# Patient Record
Sex: MALE | Race: BLACK OR AFRICAN AMERICAN | Employment: FULL TIME | URBAN - METROPOLITAN AREA
[De-identification: names, ages, dates, MRNs, and addresses within clinical notes are randomized per-mention and may not be internally consistent; named-entity substitution may affect disease eponyms.]

---

## 2020-01-31 ENCOUNTER — HOSPITAL ENCOUNTER (OUTPATIENT)
Dept: WOUND CARE | Age: 60
Discharge: HOME | End: 2020-01-31
Attending: PODIATRIST
Payer: MEDICAID

## 2020-01-31 PROCEDURE — 29580 STRAPPING UNNA BOOT: CPT

## 2020-01-31 NOTE — WOUND CARE
[address]    Subjective:         Chief Complaint: Jayden Bartlett is a 64 y.o. male who presents to [facility] today, for treatment of swelling in both legs and an open wound on the both legs. It started a few weeks ago with swelling in both his legs that caused blisters that opened up. He states it started a week or 2 ago and he was doing leg wraps at home, but it was getting worse. No past medical history on file. No past surgical history on file. Current Medications:  Prior to Admission medications    Not on File     Allergies not on file  No family history on file.   Social History     Socioeconomic History    Marital status: SINGLE     Spouse name: Not on file    Number of children: Not on file    Years of education: Not on file    Highest education level: Not on file   Occupational History    Not on file   Social Needs    Financial resource strain: Not on file    Food insecurity:     Worry: Not on file     Inability: Not on file    Transportation needs:     Medical: Not on file     Non-medical: Not on file   Tobacco Use    Smoking status: Not on file   Substance and Sexual Activity    Alcohol use: Not on file    Drug use: Not on file    Sexual activity: Not on file   Lifestyle    Physical activity:     Days per week: Not on file     Minutes per session: Not on file    Stress: Not on file   Relationships    Social connections:     Talks on phone: Not on file     Gets together: Not on file     Attends Mandaeism service: Not on file     Active member of club or organization: Not on file     Attends meetings of clubs or organizations: Not on file     Relationship status: Not on file    Intimate partner violence:     Fear of current or ex partner: Not on file     Emotionally abused: Not on file     Physically abused: Not on file     Forced sexual activity: Not on file   Other Topics Concern    Not on file   Social History Narrative    Not on file       Review of Systems:    Gen: No fever, chills, malaise, weight loss/gain. Heent: No headache, rhinorrhea, epistaxis, ear pain, hearing loss, sinus pain, neck pain/stiffness, sore throat. Heart: No chest pain, palpitations, MI, pnd, or orthopnea. Resp: No cough, hemoptysis, wheezing and shortness of breath. GI: No nausea, vomiting, diarrhea, constipation, melena or hematochezia. : No urinary obstruction, dysuria or hematuria. Derm:   Multiple scattered open ulcers right lower extremity and small open ulcer left leg.  Ozell Splinter: no bone or joint complains. Vasc: No edema, cyanosis or claudication. Endo: No heat/cold intolerance, no polyuria,polydipsia or polyphagia. Neuro: No unilateral weakness, numbness, tingling. No seizures. Heme: No easy bruising or bleeding. Objective:     Physical Assessment:    There were no vitals filed for this visit.   Lower Extremity Exam:    Vascular Exam:  Dorsalis Pedis Pulse: 2/4  Bilateral Lower Extremities  Posterior Tibial Pulse: 2/4 Bilateral Lower Extremities  Capillary Refill is < 3 seconds Bilateral Lower Extremities  Temperature of extremity from proximal to distal is warm and perfused Bilateral Lower Extremities  Recent KERRY results: None     Integumentary Exam:  Skin Texture is WNL Bilateral Lower Extremities  Pedal Hair is present Bilateral Lower Extremities  Examination of lower extremity reveals open ulcers right & left legs  Etiology: Started as a blister    Wound Description:   Wound Type: Venous stasis     Indication: Acute <30days      Status: initial     Measurements: left leg: cm    Wound Length:  0.1      Wound Width : 0.1      Wound Depth : 0.1     Tissue Type: Ulcer bed: Red granular,  base    Periwound: no Surrounding erythema with intact dermal layer    Exudate: No serous or Purulent drainage noted  Epithelial: with out necrosis  Granulation: with out necrosis  Subcutaneous: with out necrosis  Slough: no  Eschar: no  Tendon: exposed no -with out necrosis  Fascia: exposed no -with out necrosis  Muscle: exposed no -with out necrosis  Bone: exposed no -with out necrosis   Drainage: Increased  Stable      Debridement: --not required    Infection: no  Edema: yes  Both lower extremities   Status: controlled increased   Edema is being managed with: Elevation     Patient was educated on the importance of edema control today    Compression: no    Status:  Start     Type: Izabela Petersen,    Patient Compliant:  yes     Offloading:  No     Nicotine/Tobacco Use:  No  Nutrition:  Status malnourished  WNL    Orthopedic Exam:  Musculoskeletal Exam: Muscle strength is 5/5 Bilateral Lower Extremities. Tendon, Muscle and Bone and joints and WNL Bilateral Lower Extremities. Range of motion and strength noted in both ankles is WNL. Neurological Exam:  Ankle deep tendon reflexes: Are WNL Bilateral Lower Extremities  Epicritic and Protective Sensation: Are Intact Bilateral Lower Extremities  Evaluation of lower extremity nerves: Are Intact as seen with 5.07 mm monofilament at 5 points per foot. Laboratory Results:  @ Basic Metabolic Profile@  Lab Results   Component Value Date     (H) 08/30/2012    CO2 26 08/30/2012    BUN 49 (H) 08/30/2012       Data Review: No results found for this or any previous visit (from the past 24 hour(s)). Assessment:     Open ulcer   Venous insufficiency with lower extremity edema bilateral lower extremities.     Recommendation:     Plan:     Plan/Procedure:  Needs :  Good local wound care  Edema management    In wound care clinic today:  Cleanse wound with commercial wound cleanser  Apply the following topically to wound bed: Gerri  Mepatel-1  Enodform  Pramagram  Mesalt Mextra Sorbian Exiber-AG,    Apply the following to juli-wound: NA   Apply the following dressings: IZABELA Olivera  Profore compressive dressing    For Home Care/Self Care:  Cleanse wound with NS or soap and water or commercial wound cleanser  Keep dressing dry and intact when bathing  Apply the same dressings as above. Leave dressings in place until next visit    Patient to return for wound care in: 7  Days  Follow up with Nurse visit as recommended. PLEASE CONTACT OFFICE AS SOON AS POSSIBLE IF UNABLE TO MAKE THIS APPOINTMENT. Inspect your wounds, looking for signs of infection which may include the following:  Increase in redness  Red \"streaks\" from wound  Increase in swelling Fever  Unusual odor Change in the amount of wound drainage. Should you experience any significant changes in your wound(s) or have any questions regarding your home care instructions please contact the wound center or your home health company. If after regular business hours, please call your family doctor or local emergency room. Edema Control:   Elevate legs as much as possible. Avoid standing in one position for more than 10 minutes. Avoid setting with legs down. Do not cross legs while sitting. Off-Loading:     Frequent position changes. Do not cross legs while sitting. Shift weight every 20 minutes or more when sitting for prolonged periods of time.       Signed By: Jose Antonio Almeida DPM      January 31, 2020, 11:55 AM

## 2020-02-05 VITALS
TEMPERATURE: 98.6 F | RESPIRATION RATE: 18 BRPM | HEART RATE: 88 BPM | OXYGEN SATURATION: 96 % | SYSTOLIC BLOOD PRESSURE: 157 MMHG | DIASTOLIC BLOOD PRESSURE: 88 MMHG

## 2020-02-05 NOTE — WOUND CARE
02/05/20 1107   Wound Leg lower Left 01/31/20   Date First Assessed/Time First Assessed: 02/05/20 1105   Present on Hospital Admission: Yes  Primary Wound Type: Venous  Location: Leg lower  Wound Location Orientation: Left  Date of First Observation: 01/31/20   Dressing Status Intact   Dressing Type Other (Comment)  (kerlix and ace)   Non-staged Wound Description Full thickness   Wound Length (cm) 0.1 cm  (small scattered open areas, majority of ext. intact)   Wound Width (cm) 0.1 cm   Wound Depth (cm) 0.1 cm   Wound Surface Area (cm^2) 0.01 cm^2   Wound Volume (cm^3) 0 cm^3   Condition of Edges Open   Assessment Edema   Drainage Amount Scant   Drainage Color Serous   Wound Odor None   Urszula-wound Assessment Intact;Edema   Cleansing and Cleansing Agents  Dermal wound cleanser; Soap and water   Dressing Changed Changed/New   Dressing Type Applied Unna boot  (Hydrofiber AG dressing)   Procedure Tolerated Well   Wound Leg lower Right   Date First Assessed/Time First Assessed: 01/31/20 1108   Present on Hospital Admission: Yes  Primary Wound Type: Venous  Location: Leg lower  Wound Location Orientation: Right   Dressing Status Intact;Breakthrough drainage   Dressing Type Other (Comment)  (kerlix and ace wrap)   Non-staged Wound Description Full thickness   Wound Length (cm)   (large scattered open areas to ant. aspect of leg )   Condition of Base Granulation   Condition of Edges Open   Assessment Edema   Drainage Amount Moderate   Drainage Color Serosanguinous   Wound Odor Mild   Urszula-wound Assessment Edema; Intact   Cleansing and Cleansing Agents  Dermal wound cleanser; Soap and water   Dressing Changed Changed/New   Dressing Type Applied Unna boot  (hydrofiber AG dressing )   Procedure Tolerated Well     [facility] Application   Below Knee    NAME:  Arely Paz  YOB: 1960  MEDICAL RECORD NUMBER:  [id number]  DATE:  1/31/2020    Applied moisturizing agent to dry skin as needed.    Appied primary and secondary dressing as ordered. Applied Unna roll from toes to knee overlapping each time. Applied ace wrap or coban from toes to below the knee. Instructed patient/caregiver to keep dressing dry and intact. DO NOT REMOVE DRESSING. Instructed pt/family/caregiver to report excessive draining, loose bandage, wet dressing, severe pain or tingling in toes. Applied [employer] dressing below the knee to bilateral lower legs. Unna Boot(s) were applied per  Guidelines.      Electronically signed by Megan Crooks RN on 2/5/2020 at 11:46 AM

## 2020-02-05 NOTE — DISCHARGE INSTRUCTIONS
Discharge Instructions for  Luly Wakefield  [address], [facility]  [phone] Fax [phone]    NAME:  Burgess Delgado OF BIRTH:  1960  MEDICAL RECORD NUMBER:  [id number]  DATE:  1/31/2020    Wound Cleansing:   DO not remove dressing to clean wound      Dressings:           Wound Location: Left and Right leg   Leave dressings in place until next visit     Compression:  Apply: [x] Unnaboot Wrap Applied in Clinic [x]RightLeg [x]Left Leg   [x] Multi-layer compression. Do not get leg(s) with wrap wet. If wraps become too tight call the center or completely remove the wrap. [x] Elevate leg(s) above the level of the heart when sitting. [x] Avoid prolonged standing in one place. Dietary:  [] Diet as tolerated: [x] Calorie Diabetic Diet: [] No Added Salt:  [x] Increase Protein: [] Other:   Activity:  [x] Activity as tolerated:  [] Patient has no activity restrictions     [] Strict Bedrest: [] Remain off Work:     [x] May return to full duty work:                                                 Return Appointment:  [] Wound and dressing supply provider:   [] ECF or Home Healthcare:  [x] Wound Assessment [x] Physician or NP scheduled for Wound Assessment   [x] Return Appointment: With MD in 1 Week(s)  [] Ordered tests:      Electronically signed Zora Eduardo RN on 2/5/2020 at 11:28 AM     Joie Ashraf 281: Should you experience any significant changes in your wound(s) or have questions about your wound care, please contact the [address] [place] [address] at [address] [place] [address] 8:00 am - 4:30. If you need help with your wound outside these hours and cannot wait until we are again available, contact your PCP or go to the hospital emergency room. PLEASE NOTE: IF YOU ARE UNABLE TO OBTAIN WOUND SUPPLIES, CONTINUE TO USE THE SUPPLIES YOU HAVE AVAILABLE UNTIL YOU ARE ABLE TO REACH US.  IT IS MOST IMPORTANT TO KEEP THE WOUND COVERED AT ALL TIMES.

## 2020-02-07 ENCOUNTER — HOSPITAL ENCOUNTER (OUTPATIENT)
Dept: WOUND CARE | Age: 60
Discharge: HOME | End: 2020-02-07
Attending: PODIATRIST
Payer: MEDICAID

## 2020-02-07 VITALS
TEMPERATURE: 98.6 F | OXYGEN SATURATION: 100 % | RESPIRATION RATE: 18 BRPM | HEART RATE: 93 BPM | SYSTOLIC BLOOD PRESSURE: 135 MMHG | DIASTOLIC BLOOD PRESSURE: 80 MMHG

## 2020-02-07 PROCEDURE — 29580 STRAPPING UNNA BOOT: CPT

## 2020-02-07 NOTE — WOUND CARE
02/07/20 1432   Wound Leg lower Left 01/31/20   Date First Assessed/Time First Assessed: 02/05/20 1105   Present on Hospital Admission: Yes  Primary Wound Type: Venous  Location: Leg lower  Wound Location Orientation: Left  Date of First Observation: 01/31/20   Dressing Status Intact   Dressing Type Unna boot   Non-staged Wound Description Full thickness   Wound Length (cm) 0.1 cm   Wound Width (cm) 0.1 cm   Wound Depth (cm) 0.1 cm   Wound Surface Area (cm^2) 0.01 cm^2   Wound Volume (cm^3) 0 cm^3   Condition of Edges Open   Assessment Edema   Drainage Amount Scant   Drainage Color Serous   Wound Odor None   Urszula-wound Assessment Intact   Cleansing and Cleansing Agents  Dermal wound cleanser   Dressing Changed Changed/New   Dressing Type Applied Unna boot  (aquacell ag)   Procedure Tolerated Well   Wound Leg lower Right   Date First Assessed/Time First Assessed: 01/31/20 1108   Present on Hospital Admission: Yes  Primary Wound Type: Venous  Location: Leg lower  Wound Location Orientation: Right   Dressing Status Intact   Dressing Type Unna boot   Non-staged Wound Description Full thickness   Wound Length (cm) 3 cm  (small scattered open areas )   Wound Width (cm) 3 cm   Wound Depth (cm) 0.1 cm   Wound Surface Area (cm^2) 9 cm^2   Wound Volume (cm^3) 0.9 cm^3   Condition of Base Granulation   Condition of Edges Open   Assessment Edema   Drainage Amount Small   Drainage Color Serosanguinous   Wound Odor Mild   Urszula-wound Assessment Edema   Cleansing and Cleansing Agents  Dermal wound cleanser   Dressing Changed Changed/New   Dressing Type Applied Unna boot  (aquacell ag)   Procedure Tolerated Well

## 2020-02-07 NOTE — DISCHARGE INSTRUCTIONS
Discharge Instructions for  [address]  [address], [address] [facility]  [phone] Fax [phone]    NAME:  Devonte Read  YOB: 1960  MEDICAL RECORD NUMBER:  [id number]  DATE:  2/7/2020    Wound Cleansing:   Do not scrub or use excessive force. Cleanse wound prior to applying a clean dressing with:  [] Normal Saline [] Keep Wound Dry in Shower    [] Wound Cleanser   [] Cleanse wound with Mild Soap & Water  [x] May Shower at Discharge   [] Other:      Topical Treatments:  Do not apply lotions, creams, or ointments to wound bed unless directed. Dressings:           Wound Location  Bilateral lower legs   [] Apply Primary Dressing:       [] MediHoney Gel [] Alginate with Silver [] Alginate   [] Collagen [] Collagen with Silver   [] Santyl with Moisten saline gauze     [] Hydrocolloid   [] MediHoney Alginate [] Foam with Silver   [] Foam   [] Hydrofera Blue    [] Mepilex Border    [] Moisten with Saline [] Hydrogel [] Mepitel     [] Bactroban/Mupirocin [] Polysporin  [] Other:    [] Pack wound loosely with  [] Iodoform   [] Plain Packing  [] Other   [] Cover and Secure with:     [] Gauze [] Chikis [] Kerlix   [] Ace Wrap [] Cover Roll Tape [] ABD     [] Other:    Avoid contact of tape with skin. [] Change dressing: [] Daily    [] Every Other Day [] Three times per week   [] Once a week [x] Do Not Change Dressing   [] Other:    Edema Control:  Apply: [] Compression Stocking []Right Leg []Left Leg   [] Tubigrip []Right Leg Double Layer []Left Leg Double Layer       []Right Leg Single Layer []Left Leg Single Layer   [] SpandaGrip []Right Leg  []Left Leg      []Low compression 5-10 mm/Hg      []Medium compression 10-20 mm/Hg     []High compression  20-30 mm/Hg   every morning immediately when getting up should be applied to affected leg(s) from mid foot to knee making sure to cover the heel. Remove every night before going to bed.    [x] Elevate leg(s) above the level of the heart when sitting. [] Avoid prolonged standing in one place. [] Elevate arm/hand above the level of the heart []RightArm []LeftArm     Compression:  Apply: [x] Unna boot Applied in Clinic [x]RightLeg [x]Left Leg   [x] Unna boots. Do not get leg(s) with wrap wet. If wraps become too tight call the center or completely remove the wrap. [] Elevate leg(s) above the level of the heart when sitting. [] Avoid prolonged standing in one place. Off-Loading:   [x] Off-loading when [x] walking  [] in bed [] sitting  [] Total non-weight bearing  [] Right Leg  [] Left Leg   [] Assistive Device [] Walker [] Cane  [] Wheelchair  [] Crutches   [] Surgical shoe    [] Podus Boot(s)   [] Foam Boot(s)  [] Roll About    [] Cast Boot [] CROW Boot  [] Other:      Dietary:  [] Diet as tolerated: [x] Calorie Diabetic Diet: [] No Added Salt:  [] Increase Protein: [] Other:   Activity:  [x] Activity as tolerated:  [] Patient has no activity restrictions     [] Strict Bedrest: [] Remain off Work:     [] May return to full duty work:                                   [] Return to work with restrictions:             Return Appointment:  [] Wound and dressing supply provider:   [] ECF or Home Healthcare:  [] Wound Assessment: [] Physician or NP scheduled for Wound Assessment:   [x] Return Appointment:  in  1 Week(s)  [] Ordered tests:      Electronically signed Zora Mojica RN on 2/7/2020 at 2:34 PM     [facility] [place]: Should you experience any significant changes in your wound(s) or have questions about your wound care, please contact the [facility] at [address] [place] [address] 8:00 am - 4:30. If you need help with your wound outside these hours and cannot wait until we are again available, contact your PCP or go to the hospital emergency room.      PLEASE NOTE: IF YOU ARE UNABLE TO OBTAIN WOUND SUPPLIES, CONTINUE TO USE THE SUPPLIES YOU HAVE AVAILABLE UNTIL YOU ARE ABLE TO REACH US. IT IS MOST IMPORTANT TO KEEP THE WOUND COVERED AT ALL TIMES.

## 2020-02-14 ENCOUNTER — HOSPITAL ENCOUNTER (OUTPATIENT)
Dept: WOUND CARE | Age: 60
Discharge: HOME | End: 2020-02-14
Attending: PODIATRIST
Payer: MEDICAID

## 2020-02-14 VITALS
TEMPERATURE: 98.6 F | RESPIRATION RATE: 18 BRPM | SYSTOLIC BLOOD PRESSURE: 152 MMHG | DIASTOLIC BLOOD PRESSURE: 86 MMHG | OXYGEN SATURATION: 97 % | HEART RATE: 93 BPM

## 2020-02-14 PROCEDURE — 29580 STRAPPING UNNA BOOT: CPT

## 2020-02-14 NOTE — WOUND CARE
02/14/20 1320   Wound Leg lower Left 01/31/20   Date First Assessed/Time First Assessed: 02/05/20 1105   Present on Hospital Admission: Yes  Primary Wound Type: Venous  Location: Leg lower  Wound Location Orientation: Left  Date of First Observation: 01/31/20   Dressing Status Old drainage;Moist   Dressing Type Unna boot   Non-staged Wound Description Full thickness   Wound Length (cm) 0.1 cm   Wound Width (cm) 0.1 cm   Wound Depth (cm) 0.1 cm   Wound Surface Area (cm^2) 0.01 cm^2   Wound Volume (cm^3) 0 cm^3   Condition of Edges Open   Assessment Edema   Drainage Amount Scant   Drainage Color Serosanguinous   Wound Odor Musty   Urszula-wound Assessment Edema   Cleansing and Cleansing Agents  Soap and water   Dressing Changed Changed/New   Dressing Type Applied Unna boot   Procedure Tolerated Well   Wound Leg lower Right   Date First Assessed/Time First Assessed: 01/31/20 1108   Present on Hospital Admission: Yes  Primary Wound Type: Venous  Location: Leg lower  Wound Location Orientation: Right   Dressing Status Moist   Dressing Type Absorptive; Unna boot   Non-staged Wound Description Full thickness   Wound Length (cm) 2 cm   Wound Width (cm) 2 cm   Wound Depth (cm) 0.1 cm   Wound Surface Area (cm^2) 4 cm^2   Wound Volume (cm^3) 0.4 cm^3   Condition of Base Pink   Condition of Edges Open   Assessment Edema; Excoriated   Drainage Amount Small   Drainage Color Serosanguinous; Tan   Wound Odor Pungent;Musty   Urszula-wound Assessment Edema   Cleansing and Cleansing Agents  Soap and water;Dermal wound cleanser   Dressing Changed Changed/New   Dressing Type Applied Unna boot   Procedure Tolerated Well           Unna Boot Application   Below Knee    NAME:  Catia Elizabeth OF BIRTH:  1960  MEDICAL RECORD NUMBER:  [id number]  DATE:  2/14/2020    Remove old Unna Boot or Boots. Applied moisturizing agent to dry skin as needed. Appied primary and secondary dressing as ordered.   Applied Unna roll from toes to knee overlapping each time. Applied ace wrap or coban from toes to below the knee. Instructed patient/caregiver to keep dressing dry and intact. DO NOT REMOVE DRESSING. Instructed pt/family/caregiver to report excessive draining, loose bandage, wet dressing, severe pain or tingling in toes. Applied [employer] dressing below the knee to bilateral lower legs. Unna Boot(s) were applied per  Guidelines.      Electronically signed by Grady Sol RN on 2/14/2020 at 1:38 PM

## 2020-02-21 ENCOUNTER — APPOINTMENT (OUTPATIENT)
Dept: WOUND CARE | Age: 60
End: 2020-02-21
Attending: PODIATRIST
Payer: MEDICAID

## 2020-02-24 ENCOUNTER — APPOINTMENT (OUTPATIENT)
Dept: WOUND CARE | Age: 60
End: 2020-02-24
Attending: PODIATRIST
Payer: MEDICAID

## 2020-02-28 ENCOUNTER — HOSPITAL ENCOUNTER (OUTPATIENT)
Dept: WOUND CARE | Age: 60
Discharge: HOME | End: 2020-02-28
Attending: PODIATRIST
Payer: MEDICAID

## 2020-02-28 VITALS
RESPIRATION RATE: 18 BRPM | TEMPERATURE: 98.4 F | HEART RATE: 99 BPM | SYSTOLIC BLOOD PRESSURE: 155 MMHG | OXYGEN SATURATION: 99 % | DIASTOLIC BLOOD PRESSURE: 77 MMHG

## 2020-02-28 PROCEDURE — 99213 OFFICE O/P EST LOW 20 MIN: CPT

## 2020-02-28 NOTE — DISCHARGE INSTRUCTIONS
Leave dressings in place until next visit    Discharged from [address] [place] [address] AS POSSIBLE IF UNABLE TO MAKE THIS APPOINTMENT. Inspect your wounds, looking for signs of infection which may include the following:  Increase in redness  Red \"streaks\" from wound  Increase in swelling  Fever  Unusual odor  Change in the amount of wound drainage. Should you experience any significant changes in your wound(s) or have any questions regarding your home care instructions please contact the wound center or your home health company. If after regular business hours, please call your family doctor or local emergency room. Edema Control: Elevate legs as much as possible. Avoid standing in one position for more than 10 minutes. Avoid setting with legs down. Do not cross legs while sitting. Off-Loading:Frequent position changes. Do not cross legs while sitting. Shift weight every 20 minutes or more when sitting for prolonged periods of time.

## 2020-02-28 NOTE — WOUND CARE
02/28/20 1500   Wound Leg lower Left 01/31/20   Date First Assessed/Time First Assessed: 02/05/20 1105   Present on Hospital Admission: Yes  Primary Wound Type: Venous  Location: Leg lower  Wound Location Orientation: Left  Date of First Observation: 01/31/20   Dressing Status Intact   Dressing Type Unna boot   Non-staged Wound Description Full thickness   Drainage Amount None   Wound Odor None   Cleansing and Cleansing Agents  Dermal wound cleanser   Dressing Changed Changed/New   Dressing Type Applied Unna boot   Procedure Tolerated Well   Wound Leg lower Right   Date First Assessed/Time First Assessed: 01/31/20 1108   Present on Hospital Admission: Yes  Primary Wound Type: Venous  Location: Leg lower  Wound Location Orientation: Right   Dressing Status Intact   Dressing Type Unna boot   Non-staged Wound Description Full thickness   Drainage Amount None   Wound Odor None   Cleansing and Cleansing Agents  Dermal wound cleanser   Dressing Changed Changed/New   Dressing Type Applied Unna boot   Procedure Tolerated Well

## 2020-04-10 NOTE — WOUND CARE
[address]    Subjective:         Chief Complaint: Virgil Thomas is a 64 y.o. male who returns to [facility] today, for follow up treatment of open wounds on the right and left legs. They started about 3 weeks ago from swelling that caused blisters that eventually opened up causing his sores. He is continued weekly wound care with compressive dressings and has some improvement today. Past Medical History:   Diagnosis Date    Diabetes ([facility] [address] [facility]     Hypertension     Morbid obesity ([facility] [address]      No past surgical history on file. Current Medications:  Prior to Admission medications    Not on File     No Known Allergies  No family history on file.   Social History     Socioeconomic History    Marital status: SINGLE     Spouse name: Not on file    Number of children: Not on file    Years of education: Not on file    Highest education level: Not on file   Occupational History    Not on file   Social Needs    Financial resource strain: Not on file    Food insecurity     Worry: Not on file     Inability: Not on file    Transportation needs     Medical: Not on file     Non-medical: Not on file   Tobacco Use    Smoking status: Never Smoker    Smokeless tobacco: Never Used   Substance and Sexual Activity    Alcohol use: Not Currently    Drug use: Not Currently    Sexual activity: Yes   Lifestyle    Physical activity     Days per week: Not on file     Minutes per session: Not on file    Stress: Not on file   Relationships    Social connections     Talks on phone: Not on file     Gets together: Not on file     Attends Moravian service: Not on file     Active member of club or organization: Not on file     Attends meetings of clubs or organizations: Not on file     Relationship status: Not on file    Intimate partner violence     Fear of current or ex partner: Not on file     Emotionally abused: Not on file     Physically abused: Not on file     Forced sexual activity: Not on file   Other Topics Concern     Service Not Asked    Blood Transfusions Not Asked    Caffeine Concern Not Asked    Occupational Exposure Not Asked    Hobby Hazards Not Asked    Sleep Concern Not Asked    Stress Concern Not Asked    Weight Concern Not Asked    Special Diet Not Asked    Back Care Not Asked    Exercise Not Asked    Bike Helmet Not Asked   [address] Not Asked    Self-Exams Not Asked   Social History Narrative    Not on file       Review of Systems:    Gen: No fever, chills, malaise, weight loss/gain. Heent: No headache, rhinorrhea, epistaxis, ear pain, hearing loss, sinus pain, neck pain/stiffness, sore throat. Heart: No chest pain, palpitations, MI, pnd, or orthopnea. Resp: No cough, hemoptysis, wheezing and shortness of breath. GI: No nausea, vomiting, diarrhea, constipation, melena or hematochezia. : No urinary obstruction, dysuria or hematuria. Derm: Open venous stasis ulcers bilateral legs  Musc/skeletal: no bone or joint complains. Vasc: No edema, cyanosis or claudication. Endo: No heat/cold intolerance, no polyuria,polydipsia or polyphagia. Neuro: No unilateral weakness, numbness, tingling. No seizures. Heme: No easy bruising or bleeding.       Objective:     Physical Assessment:    Vitals:    02/07/20 1104   BP: 135/80   Pulse: 93   Resp: 18   Temp: 98.6 °F (37 °C)   SpO2: 100%     Lower Extremity Exam:    Vascular Exam:  Dorsalis Pedis Pulse: 2/4  Bilateral Lower Extremities  Posterior Tibial Pulse: 2/4 Bilateral Lower Extremities  Capillary Refill is < 3 seconds Bilateral Lower Extremities  Temperature of extremity from proximal to distal is warm and perfused Bilateral Lower Extremities  Recent KERRY results: None   Integumentary Exam:  Skin Texture is WNL Bilateral Lower Extremities  Pedal Hair is present Bilateral Lower Extremities  Examination of lower extremity reveals open ulcers right & left legs  Etiology: Started as a blister -    Wound Description:   Wound Type: Venous stasis    Indication:   Chronic >30days   Status: improving      Measurements:righr then left legs:cm    Wound Length:  3.0, 0.1      Wound Width : 3.0, 0.1      Wound Depth : 0.1, 0.1     Tissue Type: Ulcer bed: Red granular base    Periwound: Surrounding erythema with intact dermal layer    Exudate: Slight serous drainage noted  Epithelial: with out necrosis  Granulation: with out necrosis  Subcutaneous: with out necrosis  Slough: yes no  Eschar: yes no  Tendon: exposed no -with out necrosis  Fascia: exposed no -with out necrosis  Muscle: exposed no -with out necrosis  Bone: exposed no -with out necrosis   Drainage: Stable     Debridement:   --not required    Infection: no   Edema: yesBoth lower extremities   Status: improved    Edema is being managed with: compressive dressings  Elevation    Patient was educated on the importance of edema control today    Compression: yes   Status:  Continue      Type: Unna Boot,   Patient Compliant:  yes     Offloading:  No     Nicotine/Tobacco Use:  No  Nutrition:  Status malnourished  Obese  Diabetic type  2  Glucose Control: not controlled  Recent Hgb A1C: no  Educated on importance of balanced nutrition- yes    Orthopedic Exam:  Musculoskeletal Exam: Muscle strength is 5/5 Bilateral Lower Extremities. Tendon, Muscle and Bone and joints and WNL Bilateral Lower Extremities. Range of motion and strength noted in both ankles is WNL. Neurological Exam:  Ankle deep tendon reflexes: Are WNL Bilateral Lower Extremities  Epicritic and Protective Sensation: Are Decreased Bilateral Lower Extremities  Evaluation of lower extremity nerves: Are Decreased as seen with 5.07 mm monofilament at 5 points per foot.       Laboratory Results:  @ Basic Metabolic Profile@  Lab Results   Component Value Date     (H) 08/30/2012    CO2 26 08/30/2012    BUN 49 (H) 08/30/2012       Data Review: No results found for this or any previous visit (from the past 24 hour(s)). Assessment:     Type II Diabetes with PN. Open venous stasis ulcers bilateral legs  Venous insufficiency with lower extremity edema bilateral lower extremities. Recommendation:     Plan:     Plan/Procedure:  Needs :  Good local wound care  Edema management  Nutrition optimization  Good Diabetic control    In wound care clinic today:  Cleanse wounds with commercial wound cleanser  Apply the following topically to wound bed: Aquacel to both legs    Apply the following to juli-wound: NA   Apply the following dressings: UNNA boot  Chikis  Coban  compressive dressing to both legs    For Home Care/Self Care:  Cleanse wound with NS or soap and water or commercial wound cleanser  Keep dressing dry and intact when bathing  Apply the same dressings as above. Leave dressings in place until next visit    Patient to return for wound care in: 7 days  Follow up with Nurse visit as recommended. PLEASE CONTACT OFFICE AS SOON AS POSSIBLE IF UNABLE TO MAKE THIS APPOINTMENT. Inspect your wounds, looking for signs of infection which may include the following:  Increase in redness  Red \"streaks\" from wound  Increase in swelling Fever  Unusual odor Change in the amount of wound drainage. Should you experience any significant changes in your wound(s) or have any questions regarding your home care instructions please contact the wound center or your home health company. If after regular business hours, please call your family doctor or local emergency room. Edema Control:   Elevate legs as much as possible. Avoid standing in one position for more than 10 minutes. Avoid setting with legs down. Do not cross legs while sitting. Off-Loading:     Frequent position changes. Do not cross legs while sitting. Shift weight every 20 minutes or more when sitting for prolonged periods of time.       Signed By: Unique Beavers DPM      April 10, 2020, 3:05 PM

## 2020-04-10 NOTE — WOUND CARE
Expand All Collapse All      []Hide copied text    []Dago for details  [address] [facility] [address]     Subjective:            Chief Complaint: Baron Parisi is a 64 y.o. male who returns to [facility] today, for follow up treatment of open wounds on the right and left legs. They started about 4 weeks ago from swelling that caused blisters that eventually opened up causing his sores. He has continued weekly wound care with compressive dressings and has good improvement today.          Past Medical History:   Diagnosis Date    Diabetes ([facility] [address]      Hypertension      Morbid obesity ([facility] [address]        No past surgical history on file. Current Medications:  Prior to Admission medications    Not on File      No Known Allergies  No family history on file.   Social History            Socioeconomic History    Marital status: SINGLE       Spouse name: Not on file    Number of children: Not on file    Years of education: Not on file    Highest education level: Not on file   Occupational History    Not on file   Social Needs    Financial resource strain: Not on file    Food insecurity       Worry: Not on file       Inability: Not on file    Transportation needs       Medical: Not on file       Non-medical: Not on file   Tobacco Use    Smoking status: Never Smoker    Smokeless tobacco: Never Used   Substance and Sexual Activity    Alcohol use: Not Currently    Drug use: Not Currently    Sexual activity: Yes   Lifestyle    Physical activity       Days per week: Not on file       Minutes per session: Not on file    Stress: Not on file   Relationships    Social connections       Talks on phone: Not on file       Gets together: Not on file       Attends Latter-day service: Not on file       Active member of club or organization: Not on file       Attends meetings of clubs or organizations: Not on file       Relationship status: Not on file    Intimate partner violence       Fear of current or ex partner: Not on file       Emotionally abused: Not on file       Physically abused: Not on file       Forced sexual activity: Not on file   Other Topics Concern     Service Not Asked    Blood Transfusions Not Asked    Caffeine Concern Not Asked    Occupational Exposure Not Asked    Hobby Hazards Not Asked    Sleep Concern Not Asked    Stress Concern Not Asked    Weight Concern Not Asked    Special Diet Not Asked    Back Care Not Asked    Exercise Not Asked    Bike Helmet Not Asked   [address] Not Asked    Self-Exams Not Asked   Social History Narrative    Not on file         Review of Systems:     Gen: No fever, chills, malaise, weight loss/gain. Heent: No headache, rhinorrhea, epistaxis, ear pain, hearing loss, sinus pain, neck pain/stiffness, sore throat. Heart: No chest pain, palpitations, MI, pnd, or orthopnea. Resp: No cough, hemoptysis, wheezing and shortness of breath. GI: No nausea, vomiting, diarrhea, constipation, melena or hematochezia. : No urinary obstruction, dysuria or hematuria. Derm: venous stasis ulcers bilateral legs  Musc/skeletal: no bone or joint complains. Vasc: No edema, cyanosis or claudication. Endo: No heat/cold intolerance, no polyuria,polydipsia or polyphagia. Neuro: No unilateral weakness, numbness, tingling. No seizures.    Heme: No easy bruising or bleeding.        Objective:      Physical Assessment:         Vitals:     02/07/20 1104   BP: 135/80   Pulse: 93   Resp: 18   Temp: 98.6 °F (37 °C)   SpO2: 100%      Lower Extremity Exam:     Vascular Exam:  Dorsalis Pedis Pulse: 2/4  Bilateral Lower Extremities  Posterior Tibial Pulse: 2/4 Bilateral Lower Extremities  Capillary Refill is < 3 seconds Bilateral Lower Extremities  Temperature of extremity from proximal to distal is warm and perfused Bilateral Lower Extremities  Recent KERRY results: None   Integumentary Exam:  Skin Texture is WNL Bilateral Lower Extremities  Pedal Hair is present Bilateral Lower Extremities  Examination of lower extremity reveals open ulcers right & left legs  Etiology: Started as a blister -     Wound Description:              Wound Type: Venous stasis               Indication:   Chronic >30days              Status: improving                  Measurements:righr then left legs:cm                          Wound Length:  0,0                             Wound Width : 0,0                             Wound Depth :0,0                 Tissue Type: Ulcer bed: Dry scab covering                          Periwound: intact dermal layer                          Exudate: Negative drainage noted  Epithelial: with out necrosis  Granulation: with out necrosis  Subcutaneous: with out necrosis  Slough:  no  Eschar:  no  Tendon: exposed no -with out necrosis  Fascia: exposed no -with out necrosis  Muscle: exposed no -with out necrosis  Bone: exposed no -with out necrosis              Drainage: Stable      Debridement:   --not required     Infection: no   Edema: yes  Both lower extremities              Status: improved               Edema is being managed with: compressive dressings  Elevation               Patient was educated on the importance of edema control today     Compression: yes              Status:  Continue                 Type: Unna Boot,              Patient Compliant:  yes      Offloading:  No      Nicotine/Tobacco Use:  No  Nutrition:  Status malnourished  Obese  Diabetic type  2  Glucose Control: not controlled  Recent Hgb A1C: no  Educated on importance of balanced nutrition- yes     Orthopedic Exam:  Musculoskeletal Exam: Muscle strength is 5/5 Bilateral Lower Extremities. Tendon, Muscle and Bone and joints and WNL Bilateral Lower Extremities.   Range of motion and strength noted in both ankles is WNL.     Neurological Exam:  Ankle deep tendon reflexes: Are WNL Bilateral Lower Extremities  Epicritic and Protective Sensation: Are Decreased Bilateral Lower Extremities  Evaluation of lower extremity nerves: Are Decreased as seen with 5.07 mm monofilament at 5 points per foot.        Laboratory Results:  @ Basic Metabolic Profile@        Lab Results   Component Value Date      (H) 08/30/2012     CO2 26 08/30/2012     BUN 49 (H) 08/30/2012         Data Review:   Recent Results   No results found for this or any previous visit (from the past 24 hour(s)).       Assessment:      Type II Diabetes with PN. Venous stasis ulcers bilateral legs-resolved  Venous insufficiency with lower extremity edema bilateral lower extremities.     Recommendation:      Plan:      Plan/Procedure:  Needs :   Good Edema management bilateral lower extremities  Nutrition optimization  Good Diabetic control     In wound care clinic today:  Apply the following dressings: UNNA boot compressive dressing to both legs  Discharged patient from wound care clinic and instruct the patient on daily compression and elevation for both legs.     For Home Care/Self Care:   Patient to return for wound care in: 0 days  Follow up with Nurse visit as recommended.     PLEASE CONTACT OFFICE AS SOON AS POSSIBLE IF UNABLE TO MAKE THIS APPOINTMENT. Inspect your wounds, looking for signs of infection which may include the following:  Increase in redness  Red \"streaks\" from wound  Increase in swelling Fever  Unusual odor Change in the amount of wound drainage. Should you experience any significant changes in your wound(s) or have any questions regarding your home care instructions please contact the wound center or your home health company. If after regular business hours, please call your family doctor or local emergency room.     Edema Control:   Elevate legs as much as possible. Avoid standing in one position for more than 10 minutes. Avoid setting with legs down. Do not cross legs while sitting.     Off-Loading:     Frequent position changes. Do not cross legs while sitting.  Shift weight every 20 minutes or more when sitting for prolonged periods of time.        Signed By: Teofilo Lau DPM        April 10, 2020, 3:05 PM

## 2020-06-02 ENCOUNTER — HOSPITAL ENCOUNTER (OUTPATIENT)
Dept: WOUND CARE | Age: 60
Discharge: HOME | End: 2020-06-02
Attending: PODIATRIST
Payer: COMMERCIAL

## 2020-06-02 PROCEDURE — 29580 STRAPPING UNNA BOOT: CPT

## 2020-06-02 NOTE — WOUND CARE
Wound Care Progress Note    Subjective:   Comfort Gregorio is a 64 y.o.  male for follow up of Bilateral  Venous  ulcer to the Other part of lower leg Breakdown of Skin  Ulcer has been present for sometime    Doing well. No concerns. Wound care going well. Offloading wound: yes and n/a  Appetite: good  Wound associated pain: mild  Diabetic: yes Type 2  Insulin Dependent    Smoker:    ROS: no N/V, no T/chills; no local rash  There have been no changes in patient's medical history in the interim. Objective:   Physical Exam:    VSS, Afebrile  General: well developed, well nourished, pleasant , NAD. Hygiene good  Psych: cooperative. Pleasant. No anxiety or depression. Normal mood and affect. Neuro: alert and oriented to person/place/situation. Lower extremities: color normal; temperature normal. Hair growth is not present. Calves are supple, nontender, approximately equally sized in comparison. Focused Lower Extremity Exam  Vascular exam:    Bilateral lower extremity: Pitting  +2  edema, foot ,   DP pulse :n/a  PT pulse:   Nails Dystrophic      lower extremity:   edema, foot ,   DP pulse :   PT pulse:    Nails     Other part of lower leg Ulcer 2 Description:   Etiology:Venous   Measurement:   Ulcer bed: Epithialization  eschar    Depth of Wound: Breakdown of Skin    Periwound: Normal  Exudate: Serous minimal        Assessment/Plan   64 y.o. male with Venous  Other part of lower leg ulcer. Stasis edema  Clean wound with Normal Saline  Wound dressing ordered moisturizer, compression with unna boots  Periwound:     Compression ordered:   Prescriptions ordered:   Labs ordered:   Vascular studies:   Tissue Culture and Sensitivity:   Radiology test ordered:   Consultations:     Home Health ordered: How often:       Patient understood and agrees with plan. Questions answered. Weekly visits and serial debridements also discussed.   Follow up with me as needed keep the legs up elevated      Signed By: Maty Mason DPM     June 2, 2020

## 2020-06-11 VITALS
HEART RATE: 73 BPM | SYSTOLIC BLOOD PRESSURE: 153 MMHG | RESPIRATION RATE: 18 BRPM | TEMPERATURE: 97.8 F | OXYGEN SATURATION: 98 % | DIASTOLIC BLOOD PRESSURE: 88 MMHG

## 2020-06-11 NOTE — DISCHARGE INSTRUCTIONS
Discharge Instructions for  Luly Wakefield  [address], [address] [place] [address]  [phone] Fax [phone]    NAME:  Priscila Monroy OF BIRTH:  1960  MEDICAL RECORD NUMBER:  [id number]  DATE:  6/2/2020    Compression:  Apply: [x] Multilayer Compression Wrap Applied in Clinic [x]RightLeg [x]Left Leg   [x] Multi-layer compression. Do not get leg(s) with wrap wet. If wraps become too tight call the center or completely remove the wrap. [x] Elevate leg(s) above the level of the heart when sitting. [x] Avoid prolonged standing in one place. Dietary:  [x] Diet as tolerated: [] Calorie Diabetic Diet: [x] No Added Salt:  [] Increase Protein: [] Other:   Activity:  [x] Activity as tolerated:  [] Patient has no activity restrictions     [] Strict Bedrest: [] Remain off Work:     [] May return to full duty work:                                   [] Return to work with restrictions:             Return Appointment:  [] Wound and dressing supply provider:   [] ECF or Home Healthcare:  [] Wound Assessment: [] Physician or NP scheduled for Wound Assessment:   [x] Return Appointment: With Macrotherapy  in  1 Week(s)  [] Ordered tests:      Electronically signed Sana Agosto RN on 6/2/2020 at [address] [place] [address] Street: Should you experience any significant changes in your wound(s) or have questions about your wound care, please contact the [facility] at [address] [place] [address] 8:00 am - 4:30. If you need help with your wound outside these hours and cannot wait until we are again available, contact your PCP or go to the hospital emergency room. PLEASE NOTE: IF YOU ARE UNABLE TO OBTAIN WOUND SUPPLIES, CONTINUE TO USE THE SUPPLIES YOU HAVE AVAILABLE UNTIL YOU ARE ABLE TO REACH US. IT IS MOST IMPORTANT TO KEEP THE WOUND COVERED AT ALL TIMES.      Physician Signature:_______________________    Date: ___________ Time:  ____________

## 2020-06-11 NOTE — WOUND CARE
06/02/20 1400   Wound Leg lower Left 01/31/20   Date First Assessed/Time First Assessed: 02/05/20 1105   Present on Hospital Admission: Yes  Primary Wound Type: Venous  Location: Leg lower  Wound Location Orientation: Left  Date of First Observation: 01/31/20   Dressing Status Intact   Dressing Type Unna boot   Assessment Edema; Swelling   Drainage Amount None   Wound Odor None   Cleansing and Cleansing Agents  Dermal wound cleanser   Dressing Changed Changed/New   Dressing Type Applied Unna boot   Procedure Tolerated Well   Wound Leg lower Right   Date First Assessed/Time First Assessed: 01/31/20 1108   Present on Hospital Admission: Yes  Primary Wound Type: Venous  Location: Leg lower  Wound Location Orientation: Right   Dressing Status Intact   Dressing Type Unna boot   Non-staged Wound Description Full thickness   Assessment Edema; Swelling   Drainage Amount None   Wound Odor None   Cleansing and Cleansing Agents  Dermal wound cleanser   Dressing Changed Changed/New   Dressing Type Applied Unna boot   Procedure Tolerated Well                   Unna Boot Application   Below Knee    NAME:  Regan Trevino  YOB: 1960  MEDICAL RECORD NUMBER:  [id number]  DATE:  6/2/2020    Remove old Unna Boot or Boots. Applied moisturizing agent to dry skin as needed. Applied Unna roll from toes to knee overlapping each time. Applied ace wrap or coban from toes to below the knee. Instructed patient/caregiver to keep dressing dry and intact. DO NOT REMOVE DRESSING. Instructed pt/family/caregiver to report excessive draining, loose bandage, wet dressing, severe pain or tingling in toes. Applied [employer] dressing below the knee to bilateral lower legs. Unna Boot(s) were applied per  Guidelines.      Electronically signed by Adeel Armstrong RN on 6/2/2020 at 1400

## 2020-07-22 ENCOUNTER — HOSPITAL ENCOUNTER (OUTPATIENT)
Dept: WOUND CARE | Age: 60
Discharge: HOME | End: 2020-07-22
Attending: HOSPITALIST
Payer: COMMERCIAL

## 2020-07-22 VITALS
TEMPERATURE: 99.9 F | SYSTOLIC BLOOD PRESSURE: 131 MMHG | RESPIRATION RATE: 18 BRPM | OXYGEN SATURATION: 100 % | HEART RATE: 52 BPM | DIASTOLIC BLOOD PRESSURE: 82 MMHG

## 2020-07-22 PROCEDURE — 29580 STRAPPING UNNA BOOT: CPT

## 2020-07-23 NOTE — WOUND CARE
[employer] Application   Below Knee    NAME:  Chikis Knight  YOB: 1960  MEDICAL RECORD NUMBER:  [id number]  DATE:  7/22/2020    Remove old [employer] or Boots. Applied moisturizing agent to dry skin as needed. Appied primary and secondary dressing as ordered. Applied Unna roll from toes to knee overlapping each time. Secured with tape and/or metal clips covered with tape. Instructed patient/caregiver to keep dressing dry and intact. DO NOT REMOVE DRESSING. Instructed pt/family/caregiver to report excessive draining, loose bandage, wet dressing, severe pain or tingling in toes. Applied [employer] dressing below the knee to bilateral lower legs. Unna Boot(s) were applied per  Guidelines.      Electronically signed by Leslie Willis LPN on 8/38/1820 at 1:23 PM

## 2020-07-23 NOTE — WOUND CARE
07/22/20 1610   Wound Leg lower Left 01/31/20   Date First Assessed/Time First Assessed: 02/05/20 1105   Present on Hospital Admission: Yes  Primary Wound Type: Venous  Location: Leg lower  Wound Location Orientation: Left  Date of First Observation: 01/31/20   Dressing Status Clean, dry, and intact   Non-staged Wound Description Full thickness   Wound Length (cm) 1 cm   Wound Width (cm) 1 cm   Wound Surface Area (cm^2) 1 cm^2   Change in Wound Size % -9900   Condition of Edges Open   Assessment Edema; Swelling   Drainage Amount Small   Drainage Color Serous   Wound Odor None   Cleansing and Cleansing Agents  Dermal wound cleanser   Dressing Changed Changed/New   Dressing Type Applied Unna boot  (coban)   Procedure Tolerated Well   Wound Leg lower Right   Date First Assessed/Time First Assessed: 01/31/20 1108   Present on Hospital Admission: Yes  Primary Wound Type: Venous  Location: Leg lower  Wound Location Orientation: Right   Dressing Status Intact   Dressing Type 4 x 4;Gauze;Gauze wrap (shayy)   Non-staged Wound Description Full thickness   Wound Length (cm) 2.5 cm   Wound Width (cm) 2 cm   Wound Depth (cm) 0.1 cm   Wound Surface Area (cm^2) 5 cm^2   Wound Volume (cm^3) 0.5 cm^3   Change in Wound Size % 44.44   Condition of Base Pink   Condition of Edges Open   Assessment Edema; Swelling   Drainage Amount None   Wound Odor None   Cleansing and Cleansing Agents  Dermal wound cleanser   Dressing Changed Changed/New   Dressing Type Applied Unna boot  (coban)   Post-Procedure Length (cm) 2.5 cm   Post-Procedure Width (cm) 2 cm   Post-Procedure Depth (cm) 0.1 cm   Post-Procedure Volume (cm^3) 0.5 cm^3   Post-Procedure Surface Area (cm^2) 5 cm^2   Procedure Tolerated Well         Wounds were manual debrided.

## 2020-07-29 ENCOUNTER — HOSPITAL ENCOUNTER (OUTPATIENT)
Dept: WOUND CARE | Age: 60
Discharge: HOME | End: 2020-07-29
Attending: HOSPITALIST
Payer: COMMERCIAL

## 2020-07-29 PROCEDURE — 29580 STRAPPING UNNA BOOT: CPT

## 2020-08-05 ENCOUNTER — HOSPITAL ENCOUNTER (OUTPATIENT)
Dept: WOUND CARE | Age: 60
Discharge: HOME | End: 2020-08-05
Attending: HOSPITALIST
Payer: COMMERCIAL

## 2020-08-05 VITALS
RESPIRATION RATE: 18 BRPM | DIASTOLIC BLOOD PRESSURE: 64 MMHG | SYSTOLIC BLOOD PRESSURE: 124 MMHG | TEMPERATURE: 99 F | HEART RATE: 64 BPM | OXYGEN SATURATION: 100 %

## 2020-08-05 PROCEDURE — 99212 OFFICE O/P EST SF 10 MIN: CPT

## 2020-08-05 NOTE — WOUND CARE
08/05/20 1617   Wound Leg lower Left 01/31/20   Date First Assessed/Time First Assessed: 02/05/20 1105   Present on Hospital Admission: Yes  Primary Wound Type: Venous  Location: Leg lower  Wound Location Orientation: Left  Date of First Observation: 01/31/20   Dressing Status Clean, dry, and intact   Dressing Type Unna boot   Non-staged Wound Description Full thickness   Wound Length (cm) 1 cm   Wound Width (cm) 1 cm   Wound Depth (cm) 0.1 cm   Wound Surface Area (cm^2) 1 cm^2   Wound Volume (cm^3) 0.1 cm^3   Change in Wound Size % -9900   Condition of Edges Open   Assessment Edema; Swelling   Drainage Amount Small   Drainage Color Serous   Wound Odor None   Cleansing and Cleansing Agents  Dermal wound cleanser  (manual debridement)   Dressing Changed Changed/New   Dressing Type Applied Unna boot  (coban)   Wound Procedure Type   (manual debridement)   Procedure Tolerated Well   Wound Leg lower Right   Date First Assessed/Time First Assessed: 01/31/20 1108   Present on Hospital Admission: Yes  Primary Wound Type: Venous  Location: Leg lower  Wound Location Orientation: Right   Dressing Status Intact   Dressing Type 4 x 4;Gauze;Gauze wrap (shayy); Unna boot   Non-staged Wound Description Full thickness   Wound Length (cm) 1.5 cm   Wound Width (cm) 0.1 cm   Wound Depth (cm) 0.1 cm   Wound Surface Area (cm^2) 0.15 cm^2   Wound Volume (cm^3) 0.02 cm^3   Change in Wound Size % 98.33   Condition of Base Pink   Condition of Edges Open   Assessment Edema; Swelling   Drainage Amount None   Wound Odor None   Cleansing and Cleansing Agents  Dermal wound cleanser   Dressing Changed Changed/New   Dressing Type Applied Unna boot  (coban)   Wound Procedure Type Other  (manual debridement)   Post-Procedure Length (cm) 1.5 cm   Post-Procedure Width (cm) 0.5 cm   Post-Procedure Depth (cm) 0.1 cm   Post-Procedure Volume (cm^3) 0.08 cm^3   Post-Procedure Surface Area (cm^2) 0.75 cm^2   Procedure Tolerated Well

## 2020-08-11 PROBLEM — I83.029 VENOUS ULCER OF LEFT LEG (HCC): Status: ACTIVE | Noted: 2020-08-11

## 2020-08-11 PROBLEM — L97.919 VENOUS ULCER OF RIGHT LEG (HCC): Status: ACTIVE | Noted: 2020-08-11

## 2020-08-11 PROBLEM — I83.019 VENOUS ULCER OF RIGHT LEG (HCC): Status: ACTIVE | Noted: 2020-08-11

## 2020-08-11 PROBLEM — L97.929 VENOUS ULCER OF LEFT LEG (HCC): Status: ACTIVE | Noted: 2020-08-11

## 2020-08-11 NOTE — PROGRESS NOTES
[address] [facility]  Initial Consult note         Chief Complaint:  Nicole Lopez is a 64 y.o.  male who presents with bilateral LE edema, chronic venous insufficiency. He has LE venous ulcers since 2017,  He has on and off ulcers since then  Wound caused by: venous insufficiency  Current wound care: local wound care  Offloading wound: yes  Appetite: good  Wound associated pain: none  Diabetic: yes  Smoker: no      Past Medical History:   Diagnosis Date    Chronic venous insufficiency     Diabetes ([facility] [address]     Hypertension     Morbid obesity ([facility] [address]       No past surgical history on file. No family history on file. Social History     Tobacco Use    Smoking status: Never Smoker    Smokeless tobacco: Never Used   Substance Use Topics    Alcohol use: Not Currently       Prior to Admission medications    Not on File     No Known Allergies     Review of Systems  Gen: No fever, chills, malaise, weight loss/gain. Heent: No headache, rhinorrhea, epistaxis, ear pain, hearing loss, sinus pain, neck pain/stiffness, sore throat. Heart: No chest pain, palpitations, MI, pnd, or orthopnea. Resp: No cough, hemoptysis, wheezing and shortness of breath. GI: No nausea, vomiting, diarrhea, constipation, melena or hematochezia. : No urinary obstruction, dysuria or hematuria. Derm: see above   Musc/skeletal: no bone or joint complains. Vasc: No edema, cyanosis or claudication. Endo: No heat/cold intolerance, no polyuria,polydipsia or polyphagia. Neuro: No unilateral weakness, numbness, tingling. No seizures. Heme: No easy bruising or bleeding. Objective:     Physical Exam:     Visit Vitals  /82 (BP 1 Location: Left arm)   Pulse (!) 52   Temp 99.9 °F (37.7 °C)   Resp 18   SpO2 100%     General: well developed, well nourished, pleasant , NAD. Hygiene good  Psych: cooperative. Pleasant. No anxiety or depression. Normal mood and affect. Neuro: alert and oriented to person/place/situation.  Otherwise nonfocal.  Derm: Skin turgor for age, dry skin  HEENT: Normocephalic, atraumatic. EOMI. Conjunctiva clear. No scleral icterus. Neck: Normal range of motion. No masses. Chest: Good air entry bilaterally. Respirations nonlabored  Cardio[de-identified] Normal heart sounds,no rubs, murmurs or gallops  Abdomen: Soft, nontender, nondistended, normoactive bowel sounds  Lower extremities: color normal; temperature normal. Calves are supple, nontender. Capillary refill <3 sec  Focussed Lower Extremity Exam:  Vascular exam:  Bilateral lower extremity: mild  edema, foot ,   DP pulse : 1+  PT pulse: 1+  Left lower leg wound  Wound Length (cm) 1 cm   Wound Width (cm) 1 cm   Wound Surface Area (cm^2) 1 cm^2   Change in Wound Size % -9900   Condition of Edges Open     Right lower leg wound  Wound Length (cm) 2.5 cm   Wound Width (cm) 2 cm   Wound Depth (cm) 0.1 cm   Wound Surface Area (cm^2) 5 cm^2         Wound Description:   Wound Length:      Wound Width :     Wound Depth :     Etiology: venous stasis  Ulcer bed: Mixed Granular/Fibrotic    Periwound: Edematous  Exudate: Scant/small amount Serous exudate    Data Review:   No results found for this or any previous visit (from the past 24 hour(s)). Assessment:     Patient Active Problem List   Diagnosis Code    Venous ulcer of left leg ([facility] [address] I83.029, L97.929    Venous ulcer of right leg ([facility] [address] I83.019, L97.919    Chronic venous insufficiency I87.2    Diabetes ([facility] [address] E11.9    Hypertension I10     64 y.o. male with venous ulcer of LE bilaterally    Needs :  Serial debridement-   Good local wound care  Edema management  Nutrition optimization  Good Diabetic control  Keep legs elevated  Plan:     In wound care clinic today:07/22/2020   Cleanse wound with NS or soap and water or commercial wound cleanser   Apply the following topically to wound bed: Proshield   Apply the following to juli-wound:   Apply the following dressings:unnaboots bilateral lower legs.      For Home Care/Self Care Frequency:   Cleanse wound with NS or soap and water or commercial wound cleanser   Patient may shower without dressing   Keep dressing dry and intact when bathing   Apply the following to wound bed:   Apply the following to skin around wound:   Apply the following dressings:     Leave dressings in place until next visit     Patient to return for wound care in: [facility]. Inspect your wounds, looking for signs of infection which may include the following:  Increase in redness  Red \"streaks\" from wound  Increase in swelling  Fever  Unusual odor  Change in the amount of wound drainage. Should you experience any significant changes in your wound(s) or have any questions regarding your home care instructions please contact the wound center or your home health company. If after regular business hours, please call your family doctor or local emergency room. Edema Control: Elevate legs as much as possible. Avoid standing in one position for more than 10 minutes. Avoid setting with legs down. Do not cross legs while sitting. Off-Loading:Frequent position changes. Do not cross legs while sitting. Shift weight every 20 minutes or more when sitting for prolonged periods of time.    Signed By: Traci Yañez MD     August 11, 2020

## 2020-08-12 ENCOUNTER — HOSPITAL ENCOUNTER (OUTPATIENT)
Dept: WOUND CARE | Age: 60
Discharge: HOME | End: 2020-08-12
Attending: HOSPITALIST
Payer: COMMERCIAL

## 2020-08-12 PROCEDURE — 99213 OFFICE O/P EST LOW 20 MIN: CPT

## 2020-08-20 VITALS
TEMPERATURE: 97.7 F | RESPIRATION RATE: 18 BRPM | SYSTOLIC BLOOD PRESSURE: 122 MMHG | OXYGEN SATURATION: 96 % | HEART RATE: 78 BPM | DIASTOLIC BLOOD PRESSURE: 85 MMHG

## 2020-08-20 NOTE — WOUND CARE
[address] [facility]  Follow up note. Chief Complaint:  Courtney Munoz is a 64 y.o.  male who presents for follow up of 64 y.o.  male who presents with bilateral LE edema, chronic venous insufficiency. He has LE venous ulcers since 2017,  He has on and off ulcers since then. Review of systems  General: No fevers or chills. Cardiovascular: No chest pain or pressure. No palpitations. Pulmonary: No shortness of breath. Gastrointestinal: No nausea, vomiting. Derm: See above                Physical Exam:     There were no vitals taken for this visit. General: well developed, well nourished, pleasant , NAD. Hygiene good  Psych: cooperative. Pleasant. No anxiety or depression. Normal mood and affect. Neuro: alert and oriented to person/place/situation. Otherwise nonfocal.  Derm: Skin turgor normal for age, dry skin  HEENT: Normocephalic, atraumatic. EOMI. Conjunctiva clear. No scleral icterus. Chest: Good air entry bilaterally. Respirations non labored  Cardio[de-identified] Normal heart sounds,no rubs, murmurs or gallops  Abdomen: Soft, nontender, nondistended, normoactive bowel sounds  Lower extremities: color normal; temperature normal. Calves are supple, nontender. Capillary refill <3 sec  Wound Length (cm) 1 cm   Wound Width (cm) 1 cm   Wound Surface Area (cm^2) 1 cm^2         Wound Description:   Wound Length:      Wound Width :     Wound Depth :     Etiology: venous stasis  Ulcer bed: Mixed Granular/Fibrotic    Periwound: Edematous  Exudate: Scant/small amount Serous exudate    Data Review:   No results found for this or any previous visit (from the past 24 hour(s)).     Assessment:     Patient Active Problem List   Diagnosis Code    Venous ulcer of left leg ([facility] [address] I83.029, L97.929    Venous ulcer of right leg ([facility] [address] I83.019, L97.919    Chronic venous insufficiency I87.2    Diabetes ([facility] [address] E11.9    Hypertension I10     64 y.o. male with venous ulcer of LE bilaterally     Needs :  Serial debridement-   Good local wound care  Edema management  Nutrition optimization  Good Diabetic control  Keep legs elevated  Plan:     In wound care clinic today:07/29/2020   Cleanse wound with NS or soap and water or commercial wound cleanser   Apply the following topically to wound bed: Proshield   Apply the following to juli-wound:   Apply the following dressings:unnaboots bilateral lower legs. For Home Care/Self Care   Keep dressing dry and intact when bathing           Leave dressings in place until next visit     Patient to return for wound care in: 7 Days     PLEASE CONTACT OFFICE AS SOON AS POSSIBLE IF UNABLE TO MAKE THIS APPOINTMENT. Inspect your wounds, looking for signs of infection which may include the following:  Increase in redness  Red \"streaks\" from wound  Increase in swelling  Fever  Unusual odor  Change in the amount of wound drainage. Should you experience any significant changes in your wound(s) or have any questions regarding your home care instructions please contact the wound center or your home health company. If after regular business hours, please call your family doctor or local emergency room. Edema Control: Elevate legs as much as possible. Avoid standing in one position for more than 10 minutes. Avoid setting with legs down. Do not cross legs while sitting. Off-Loading:Frequent position changes. Do not cross legs while sitting. Shift weight every 20 minutes or more when sitting for prolonged periods of time.      Signed By: Anum Zheng MD     August 20, 2020

## 2020-08-21 NOTE — WOUND CARE
08/12/20 1549   Wound Leg lower Left 01/31/20   Date First Assessed/Time First Assessed: 02/05/20 1105   Present on Hospital Admission: Yes  Primary Wound Type: Venous  Location: Leg lower  Wound Location Orientation: Left  Date of First Observation: 01/31/20   Dressing Status Clean, dry, and intact   Dressing Type Unna boot   Non-staged Wound Description Full thickness   Wound Length (cm)   (clinically closed)   Assessment Intact   Drainage Amount None   Wound Odor None   Cleansing and Cleansing Agents    (foam wash and barrier wipes)   Dressing Changed Changed/New   Dressing Type Applied   (tubigrips and ace wraps)   Wound Leg lower Right   Date First Assessed/Time First Assessed: 01/31/20 1108   Present on Hospital Admission: Yes  Primary Wound Type: Venous  Location: Leg lower  Wound Location Orientation: Right   Dressing Status Clean, dry, and intact   Dressing Type Unna boot   Non-staged Wound Description Full thickness   Wound Length (cm)   (clinically closed)   Assessment Intact   Drainage Amount None   Wound Odor None   Cleansing and Cleansing Agents    (foam wash and barrier wipes)   Dressing Changed Changed/New   Dressing Type Applied   (tubigrip and ace wraps)   Procedure Tolerated Well

## 2020-09-21 NOTE — WOUND CARE
[address] [facility]  Follow up note. Chief Complaint:  Nguyen Wadsworth is a 64 y.o.  male who presents for follow up of 64 y.o.  male who presents with bilateral LE edema, chronic venous insufficiency. He has LE venous ulcers since 2017,  He has on and off ulcers since then. Review of systems  General: No fevers or chills. Cardiovascular: No chest pain or pressure. No palpitations. Pulmonary: No shortness of breath. Gastrointestinal: No nausea, vomiting. Derm: See above                Physical Exam:     Visit Vitals  /85 (BP 1 Location: Left arm, BP Patient Position: Sitting)   Pulse 78   Temp 97.7 °F (36.5 °C)   Resp 18   SpO2 96%     General: well developed, well nourished, pleasant , NAD. Hygiene good  Psych: cooperative. Pleasant. No anxiety or depression. Normal mood and affect. Neuro: alert and oriented to person/place/situation. Otherwise nonfocal.  Derm: Skin turgor normal for age, dry skin  HEENT: Normocephalic, atraumatic. EOMI. Conjunctiva clear. No scleral icterus. Chest: Good air entry bilaterally. Respirations non labored  Cardio[de-identified] Normal heart sounds,no rubs, murmurs or gallops  Abdomen: Soft, nontender, nondistended, normoactive bowel sounds  Lower extremities: color normal; temperature normal. Calves are supple, nontender. Capillary refill <3 sec  Wound Length (cm) 1 cm   Wound Width (cm) 1 cm   Wound Surface Area (cm^2) 1 cm^2         Wound Description:   Wound Length:      Wound Width :     Wound Depth :     Etiology: venous stasis  Ulcer bed: Mixed Granular/Fibrotic    Periwound: Edematous  Exudate: Scant/small amount Serous exudate    Data Review:   No results found for this or any previous visit (from the past 24 hour(s)).     Assessment:     Patient Active Problem List   Diagnosis Code    Venous ulcer of left leg ([address] I83.029, L97.929    Venous ulcer of right leg ([address] I83.019, L97.919    Chronic venous insufficiency I87.2    Diabetes ([address] E11.9    Hypertension I5     64 y.o. male with venous ulcer of LE bilaterally  Now healed     Needs :    Edema management  Good Diabetic control  Keep legs elevated  Plan:     Follow up as needed    Signed By: Huey Mcardle, MD     September 21, 2020

## 2021-03-09 ENCOUNTER — APPOINTMENT (OUTPATIENT)
Dept: WOUND CARE | Age: 61
End: 2021-03-09
Attending: PODIATRIST

## 2021-03-16 ENCOUNTER — APPOINTMENT (OUTPATIENT)
Dept: WOUND CARE | Age: 61
End: 2021-03-16
Attending: PODIATRIST

## 2021-03-23 ENCOUNTER — HOSPITAL ENCOUNTER (OUTPATIENT)
Dept: WOUND CARE | Age: 61
Discharge: HOME | End: 2021-03-23
Payer: COMMERCIAL

## 2021-03-23 VITALS
SYSTOLIC BLOOD PRESSURE: 146 MMHG | RESPIRATION RATE: 20 BRPM | TEMPERATURE: 98.7 F | HEART RATE: 88 BPM | OXYGEN SATURATION: 95 % | DIASTOLIC BLOOD PRESSURE: 81 MMHG

## 2021-03-23 PROCEDURE — 29580 STRAPPING UNNA BOOT: CPT

## 2021-03-23 NOTE — WOUND CARE
[address]   Progress Note and Procedure Note   NO Procedure      Austin Solis RECORD NUMBER:  [id number]  AGE: 63 y.o. RACE BLACK/  GENDER: male  : 1960  EPISODE DATE:  3/23/2021    Subjective:     No chief complaint on file. Patient complaints of leg ulcers, referred by the PMD for leg wounds and swelling, can't wear the wraps because they are peeling the skin off the legs stopped wearing them. HISTORY of PRESENT ILLNESS HPI    [email or web address] Epifanio Koch is a 63 y.o. male who presents today for wound/ulcer evaluation. History of Wound Context: chronic venous edema no ulcers pre ulcerative skin   Wound/Ulcer Pain Timing/Severity: mild  Quality of pain: sharp  Severity:  3 / 10   Modifying Factors: Pain worsens with edema   Associated Signs/Symptoms: edema    Ulcer Identification:  Ulcer Type: venous    Contributing Factors: edema, venous stasis and lymphedema    Wound: pre ulcerative skin multiple areas bilateral legs         PAST MEDICAL HISTORY    Past Medical History:   Diagnosis Date    Chronic venous insufficiency     Diabetes ([facility] [address]     Hypertension     Morbid obesity ([facility] [address]         PAST SURGICAL HISTORY    No past surgical history on file. FAMILY HISTORY    No family history on file. SOCIAL HISTORY    Social History     Tobacco Use    Smoking status: Never Smoker    Smokeless tobacco: Never Used   Substance Use Topics    Alcohol use: Not Currently    Drug use: Not Currently       ALLERGIES    No Known Allergies    MEDICATIONS    No current outpatient medications on file prior to encounter. No current facility-administered medications on file prior to encounter. REVIEW OF SYSTEMS    Pertinent items are noted in HPI.     Objective:     Visit Vitals  BP (!) 146/81 (BP 1 Location: Right upper arm, BP Patient Position: Sitting)   Pulse 88   Temp 98.7 °F (37.1 °C)   Resp 20   SpO2 95%       Wt Readings from Last 3 Encounters:   No data found for Wt       PHYSICAL EXAM    Pertinent items are noted in HPI. Assessment:      Problem List Items Addressed This Visit     None          Procedure Note  Indications:  Based on my examination of this patient's wound(s)/ulcer(s) today, debridement is not required to promote healing and evaluate the wound base. Wound Leg lower Left 01/31/20 (Active)   Number of days: 412       Wound Leg lower Right (Active)   Number of days: 417        Plan:   Compression wraps and discharge patient to follow up with home health and PMD since there are no open wounds seen in the legs today    Treatment Note please see attached Discharge Instructions    Written patient dismissal instructions given to patient or POA.          Electronically signed by Inderjit Limon DPM on 3/23/2021 at 3:11 PM

## 2021-04-05 NOTE — WOUND CARE
03/23/21 1626   Wound Leg lower Left 03/23/21   Date First Assessed/Time First Assessed: 03/23/21 1624   Primary Wound Type: Venous  Location: Leg lower  Wound Location Orientation: Left  Date of First Observation: 03/23/21   Wound Etiology Venous   Dressing Status Intact   Cleansed Wound cleanser   Dressing/Treatment   (unna boot)   Wound Length (cm) 0.5 cm   Wound Width (cm) 0.5 cm   Wound Depth (cm) 0.1 cm   Wound Surface Area (cm^2) 0.25 cm^2   Wound Volume (cm^3) 0.02 cm^3   Wound Assessment Epithelialization   Drainage Amount Scant   Drainage Description Thin   Wound Odor None   Urszula-Wound/Incision Assessment Dry/flaky; Intact   Edges Defined edges   Wound Thickness Description Partial thickness   Wound Leg lower Right 03/23/21   Date First Assessed/Time First Assessed: 03/23/21 1625   Primary Wound Type: Venous  Location: Leg lower  Wound Location Orientation: Right  Date of First Observation: 03/23/21   Wound Etiology Venous   Dressing Status Clean;Dry   Cleansed Soap and water   Dressing/Treatment   (unna boot)   Wound Length (cm) 0.5 cm   Wound Width (cm) 0.5 cm   Wound Depth (cm) 0.1 cm   Wound Surface Area (cm^2) 0.25 cm^2   Wound Volume (cm^3) 0.02 cm^3                           Unna Boot Application   Below Knee    NAME:  Kori Jara  YOB: 1960  MEDICAL RECORD NUMBER:  [id number]  DATE:  3/23/2021    Applied moisturizing agent to dry skin as needed. Applied Unna roll from toes to knee overlapping each time. Applied ace wrap or coban from toes to below the knee. Instructed patient/caregiver to keep dressing dry and intact. DO NOT REMOVE DRESSING. Instructed pt/family/caregiver to report excessive draining, loose bandage, wet dressing, severe pain or tingling in toes. Applied [employer] dressing below the knee to bilateral lower legs. Unna Boot(s) were applied per  Guidelines.     Response to treatment: Well tolerated by patient      Electronically signed by Christella Sever, RN on 3/23/2021 at 2:00 PM

## 2021-07-12 ENCOUNTER — HOSPITAL ENCOUNTER (OUTPATIENT)
Dept: WOUND CARE | Age: 61
Discharge: HOME | End: 2021-07-12
Attending: FAMILY MEDICINE
Payer: COMMERCIAL

## 2021-07-12 VITALS
SYSTOLIC BLOOD PRESSURE: 150 MMHG | DIASTOLIC BLOOD PRESSURE: 92 MMHG | TEMPERATURE: 99 F | HEART RATE: 85 BPM | RESPIRATION RATE: 18 BRPM | OXYGEN SATURATION: 100 %

## 2021-07-12 PROCEDURE — 29580 STRAPPING UNNA BOOT: CPT

## 2021-07-12 RX ORDER — LISINOPRIL AND HYDROCHLOROTHIAZIDE 10; 12.5 MG/1; MG/1
1 TABLET ORAL DAILY
COMMUNITY

## 2021-07-12 RX ORDER — ATORVASTATIN CALCIUM 20 MG/1
20 TABLET, FILM COATED ORAL DAILY
COMMUNITY

## 2021-07-12 RX ORDER — AMLODIPINE BESYLATE 10 MG/1
10 TABLET ORAL DAILY
COMMUNITY

## 2021-07-12 RX ORDER — INSULIN GLARGINE 100 [IU]/ML
10 INJECTION, SOLUTION SUBCUTANEOUS
COMMUNITY

## 2021-07-12 RX ORDER — INDOMETHACIN 25 MG/1
25 CAPSULE ORAL 2 TIMES DAILY
COMMUNITY

## 2021-07-12 RX ORDER — FUROSEMIDE 20 MG/1
20 TABLET ORAL DAILY
COMMUNITY

## 2021-07-12 RX ORDER — CARVEDILOL 25 MG/1
25 TABLET ORAL 2 TIMES DAILY WITH MEALS
COMMUNITY

## 2021-07-12 NOTE — DISCHARGE INSTRUCTIONS
Discharge Instructions for  [address]  [address], [address] [facility]  [phone] Fax [phone]    NAME:  Maxime Bradley OF BIRTH:  1960  MEDICAL RECORD NUMBER:  [id number]  DATE:  7/12/2021    Wound Cleansing:   Do not scrub or use excessive force. Topical Treatments:  Do not apply lotions, creams, or ointments to wound bed unless directed. Dressings:           Wound Location BLE   [] Apply Primary Dressing:       [] MediHoney Gel [] Alginate with Silver [] Alginate   [] Collagen [] Collagen with Silver   [] Santyl with Moisten saline gauze     [] Hydrocolloid   [] MediHoney Alginate [] Foam with Silver   [] Foam   [] Hydrofera Blue    [] Mepilex Border    [] Moisten with Saline [] Hydrogel [] Mepitel     [] Bactroban/Mupirocin [] Polysporin  [] Other:    [] Pack wound loosely with  [] Iodoform   [] Plain Packing  [] Other   [] Cover and Secure with:     [] Gauze [] Chikis [] Kerlix   [] Ace Wrap [] Cover Roll Tape [] ABD     [] Other:    Avoid contact of tape with skin.   [] Change dressing: [] Daily    [] Every Other Day [] Three times per week   [] Once a week [x] Do Not Change Dressing   [] Other:     Dietary:  [x] Diet as tolerated: [] Calorie Diabetic Diet: [] No Added Salt:  [] Increase Protein: [] Other:   Activity:  [x] Activity as tolerated:  [] Patient has no activity restrictions     [] Strict Bedrest: [] Remain off Work:     [] May return to full duty work:                                   [] Return to work with restrictions:             Return Appointment:  [] Wound and dressing supply provider:   [] ECF or Home Healthcare:  [] Wound Assessment: [] Physician or NP scheduled for Wound Assessment:   [x] Return Appointment: With MD  in  1 Week(s)  [] Ordered tests:      Electronically signed Zora Zambrano RN on 7/12/2021 at 11:56 AM     [address] [place] [address] Information: Should you experience any significant changes in your wound(s) or have questions about your wound care, please contact the [address] at [address] [place] [address] 8:00 am - 4:30. If you need help with your wound outside these hours and cannot wait until we are again available, contact your PCP or go to the hospital emergency room. PLEASE NOTE: IF YOU ARE UNABLE TO OBTAIN WOUND SUPPLIES, CONTINUE TO USE THE SUPPLIES YOU HAVE AVAILABLE UNTIL YOU ARE ABLE TO REACH US. IT IS MOST IMPORTANT TO KEEP THE WOUND COVERED AT ALL TIMES.

## 2021-07-12 NOTE — WOUND CARE
Wound Center  Progress Note / Procedure Note    Subjective:     Chief Complaint:  Jose Guadalupe Biswas is a 63 y.o.  male  with B/l Leg wounds of >1 weeks duration. Referred by:  Dr Maribell Bobo    HPI:     Known to this clinic  Skin breakdown/wounds this time every year  Unable to wear compression  So uses ACE wraps    Has few areas both legs  pcp adv him to come here      History/Chart/Medications reviewed    Wound caused by: venous/swelling  Current wound care:See flowsheet  Offloading wound: yes  Elevating legs: yes  Compression compliance:yes  Appetite: good  Wound associated pain: See flowsheet  Diabetic: yes, A1c 12  Smoker: no  ROS: no N/V/D, no T/chills; no local rash, no chest pain or shortness of breath, no headache or dizzyness      Objective:     Physical Exam:   See flowsheet / nursing notes for vitals  Visit Vitals  BP (!) 150/92 (BP 1 Location: Left upper arm)   Pulse 85   Temp 99 °F (37.2 °C)   Resp 18   SpO2 100%     General: NAD. Hygiene good  Psych: cooperative. No anxiety or depression. Normal mood and affect. Neuro: alert and oriented to person/place/situation. Otherwise nonfocal.  Derm: Normal  turgor for age, dry skin  HEENT: Normocephalic, atraumatic. EOMI. Conjunctiva clear. No scleral icterus. Neck: Normal range of motion. No masses. Chest: Respirations nonlabored  Lower extremities: hyperpigementedl; temperature normal. Hair growth is not present. Calves are supple, nontender, approximately equally sized in comparison. Focussed Lower Extremity Exam:  Vascular exam:  Right lower extremity: mild  edema, foot warm,   DP pulse : 1+  PT pulse: 0  Nails dystrophic   Left lower extremity: mild  edema, foot warm,   DP pulse : 1+  PT pulse: 0   Nails dystrophic    Ulcer Description:   See Flowsheet           Data Review:                Assessment/Plan     63 y.o. male with Dm2,  venous insuff with recurrent wounds    -right leg ulcer.   Scattered areas, small  Partial thickness    -left leg ulcer. Scattered areas, small  Partial thickness      Following discussed with patient   Needs :      Good local wound care  Dressing: aquacel  Frequency :once weekly  unnas        Patient/ understood and agrees with plan. Questions answered. Serial visits and serial debridements also discussed. Follow up with me in 1 week        Procedure:     n/a        -------    Past Medical History:   Diagnosis Date    Chronic venous insufficiency     [facility] [address]     Hypertension     Morbid obesity ([facility] [address]       No past surgical history on file. No family history on file. Social History     Tobacco Use    Smoking status: Never Smoker    Smokeless tobacco: Never Used   Substance Use Topics    Alcohol use: Not Currently       Prior to Admission medications    Medication Sig Start Date End Date Taking? Authorizing Provider   atorvastatin (LIPITOR) 20 mg tablet Take 20 mg by mouth daily. Yes Provider, Historical   lisinopril-hydroCHLOROthiazide (PRINZIDE, ZESTORETIC) 10-12.5 mg per tablet Take 1 Tablet by mouth daily. Yes Provider, Historical   carvediloL (COREG) 25 mg tablet Take 25 mg by mouth two (2) times daily (with meals). Yes Provider, Historical   amLODIPine (NORVASC) 10 mg tablet Take 10 mg by mouth daily. Yes Provider, Historical   indomethacin (INDOCIN) 25 mg capsule Take 25 mg by mouth two (2) times a day. Yes Provider, Historical   insulin glargine (Lantus U-100 Insulin) 100 unit/mL injection 10 Units by SubCUTAneous route nightly. Yes Provider, Historical   furosemide (Lasix) 20 mg tablet Take 20 mg by mouth daily.    Yes Provider, Historical     No Known Allergies       Signed By: Aryan Rosas MD     July 12, 2021

## 2021-07-12 NOTE — WOUND CARE
07/12/21 1147   Wound Leg lower Left;Right skin changes from venous insufficiencies/ bandage associated trauma   Date First Assessed/Time First Assessed: 07/12/21 1146   Present on Hospital Admission: Yes  Primary Wound Type: Venous  Location: Leg lower  Wound Location Orientation: Left;Right  Wound Description: skin changes from venous insufficiencies/ bandage . .. Wound Image     Wound Etiology Traumatic   Dressing Status Other (Comment)  (old ace wraps pt states they are 3weeks old )   Cleansed Soap and water   Dressing/Treatment Zinc paste   Dressing Change Due 07/19/21   Wound Length (cm)   (scattered open areas )   Drainage Amount Scant   Drainage Description Sanguineous   Wound Odor None   Urszula-Wound/Incision Assessment Hemosiderin staining (brown yellow); Other (Comment)  (old dried/scaley skin )   Edges Attached edges   Wound Thickness Description Partial thickness     Unna Boot Application   Below Knee    NAME:  Albino Meza  YOB: 1960  MEDICAL RECORD NUMBER:  [id number]  DATE:  7/12/2021    Remove old Unna Boot or Boots. Applied moisturizing agent to dry skin as needed. Appied primary and secondary dressing as ordered. Applied Unna roll from toes to knee overlapping each time. Applied ace wrap or coban from toes to below the knee. Secured with tape and/or metal clips covered with tape. Instructed patient/caregiver to keep dressing dry and intact. DO NOT REMOVE DRESSING. Instructed pt/family/caregiver to report excessive draining, loose bandage, wet dressing, severe pain or tingling in toes. Applied [employer] dressing below the knee to bilateral lower legs. Unna Boot(s) were applied per  Guidelines.     Response to treatment: Well tolerated by patient      Electronically signed by Concepcion Jones RN on 7/12/2021 at 11:51 AM

## 2021-08-09 ENCOUNTER — HOSPITAL ENCOUNTER (OUTPATIENT)
Dept: WOUND CARE | Age: 61
Discharge: HOME | End: 2021-08-09
Attending: FAMILY MEDICINE
Payer: COMMERCIAL

## 2021-08-09 VITALS
HEART RATE: 66 BPM | TEMPERATURE: 98 F | SYSTOLIC BLOOD PRESSURE: 116 MMHG | RESPIRATION RATE: 18 BRPM | OXYGEN SATURATION: 97 % | DIASTOLIC BLOOD PRESSURE: 64 MMHG

## 2021-08-09 PROCEDURE — 29580 STRAPPING UNNA BOOT: CPT

## 2021-08-09 NOTE — WOUND CARE
Wound Center  Progress Note     Subjective:     Chief Complaint:  Gustabo Cheek is a 63 y.o.  male  with B/l Leg wounds of >1 weeks duration. HPI:     Known to this clinic  Skin breakdown/wounds this time every year  Unable to wear compression  So uses ACE wraps    Has few areas both legs  pcp adv him to come here    History/Chart/Medications reviewed    Since last visit:  Didnn't make it last week- phone broke, haily stayed on 2 weeks    Wound caused by: venous/swelling  Current wound care:See flowsheet  Offloading wound: yes  Elevating legs: yes  Compression compliance:yes  Appetite: good  Wound associated pain: See flowsheet  Diabetic: yes, A1c 12  Smoker: no  ROS: no N/V/D, no T/chills; no local rash, no chest pain or shortness of breath, no headache or dizzyness      Objective:     Physical Exam:   See flowsheet / nursing notes for vitals  Visit Vitals  /64 (BP 1 Location: Left upper arm)   Pulse 66   Temp 98 °F (36.7 °C)   Resp 18   SpO2 97%     General: NAD. Hygiene good  Psych: cooperative. No anxiety or depression. Normal mood and affect. Neuro: alert and oriented to person/place/situation. Otherwise nonfocal.  Derm: Normal  turgor for age, dry skin  HEENT: Normocephalic, atraumatic. EOMI. Conjunctiva clear. No scleral icterus. Neck: Normal range of motion. No masses. Chest: Respirations nonlabored  Lower extremities: hyperpigementedl; temperature normal. Hair growth is not present. Calves are supple, nontender, approximately equally sized in comparison. Focussed Lower Extremity Exam:  Vascular exam:  Right lower extremity: mild  edema, foot warm,   DP pulse : 1+  PT pulse: 0  Nails dystrophic   Left lower extremity: mild  edema, foot warm,   DP pulse : 1+  PT pulse: 0   Nails dystrophic    Ulcer Description:   See Flowsheet           Data Review:                Assessment/Plan     63 y.o. male with Dm2,  venous insuff with recurrent wounds    -right leg ulcer.   Scattered areas, small- more areas this week  Full thickness    -left leg ulcer. Scattered areas, small- more areas this week  Full thickness      Following discussed with patient   Needs :      Good local wound care  Dressing: mepilex transfer  Frequency :once weekly  unnas        Patient/ understood and agrees with plan. Questions answered. Serial visits and serial debridements also discussed. Follow up with me in 1 week        Procedure:     n/a        -------    Past Medical History:   Diagnosis Date    Chronic venous insufficiency     Diabetes ([facility] [address]     Hypertension     Morbid obesity ([facility] [address]       No past surgical history on file. No family history on file. Social History     Tobacco Use    Smoking status: Never Smoker    Smokeless tobacco: Never Used   Substance Use Topics    Alcohol use: Not Currently       Prior to Admission medications    Medication Sig Start Date End Date Taking? Authorizing Provider   atorvastatin (LIPITOR) 20 mg tablet Take 20 mg by mouth daily. Provider, Historical   lisinopril-hydroCHLOROthiazide (PRINZIDE, ZESTORETIC) 10-12.5 mg per tablet Take 1 Tablet by mouth daily. Provider, Historical   carvediloL (COREG) 25 mg tablet Take 25 mg by mouth two (2) times daily (with meals). Provider, Historical   amLODIPine (NORVASC) 10 mg tablet Take 10 mg by mouth daily. Provider, Historical   indomethacin (INDOCIN) 25 mg capsule Take 25 mg by mouth two (2) times a day. Provider, Historical   insulin glargine (Lantus U-100 Insulin) 100 unit/mL injection 10 Units by SubCUTAneous route nightly. Provider, Historical   furosemide (Lasix) 20 mg tablet Take 20 mg by mouth daily.     Provider, Historical     No Known Allergies       Signed By: Azucena Blanco MD     August 9, 2021

## 2021-08-16 ENCOUNTER — HOSPITAL ENCOUNTER (OUTPATIENT)
Dept: WOUND CARE | Age: 61
Discharge: HOME | End: 2021-08-16
Attending: FAMILY MEDICINE
Payer: COMMERCIAL

## 2021-08-16 VITALS
HEART RATE: 55 BPM | SYSTOLIC BLOOD PRESSURE: 102 MMHG | DIASTOLIC BLOOD PRESSURE: 58 MMHG | TEMPERATURE: 98.6 F | OXYGEN SATURATION: 98 % | RESPIRATION RATE: 18 BRPM

## 2021-08-16 PROCEDURE — 99211 OFF/OP EST MAY X REQ PHY/QHP: CPT

## 2021-08-16 NOTE — WOUND CARE
08/16/21 1130   Right Leg Edema Point of Measurement   Compression Therapy Unna boot   Left Leg Edema Point of Measurement   Compression Therapy Unna boot   Wound Leg lower Left;Right skin changes from venous insufficiencies/ bandage associated trauma   Date First Assessed/Time First Assessed: 07/12/21 1146   Present on Hospital Admission: Yes  Primary Wound Type: Venous  Location: Leg lower  Wound Location Orientation: Left;Right  Wound Description: skin changes from venous insufficiencies/ bandage . ..    Wound Image    Wound Etiology Traumatic   Cleansed Soap and water   Dressing/Treatment Other (Comment)  (transfer, unna boot, coban)   Wound Length (cm)   (scattered open areas)   Drainage Amount Scant   Wound Odor None   Urszula-Wound/Incision Assessment Dry/flaky   Edges Attached edges   Wound Thickness Description Partial thickness   Pain 1   Pain Scale 1 Numeric (0 - 10)   Pain Intensity 1 0   Patient Stated Pain Goal 1

## 2021-08-16 NOTE — WOUND CARE
[employer] Application   Below Knee    NAME:  Jose Guadalupe Biswas  YOB: 1960  MEDICAL RECORD NUMBER:  [id number]  DATE:  8/16/2021    Remove old [employer] or Boots. Applied moisturizing agent to dry skin as needed. Appied primary and secondary dressing as ordered. Applied Unna roll from toes to knee overlapping each time. Instructed patient/caregiver to keep dressing dry and intact. DO NOT REMOVE DRESSING. Instructed pt/family/caregiver to report excessive draining, loose bandage, wet dressing, severe pain or tingling in toes. Applied [employer] dressing below the knee to bilateral lower legs. Unna Boot(s) were applied per  Guidelines.     Response to treatment: Well tolerated by patient      Electronically signed by Renata Elise LPN on 5/72/1730 at 5:58 PM

## 2021-08-16 NOTE — WOUND CARE
Wound Center  Progress Note     Subjective:     Chief Complaint:  Nicci Neely is a 63 y.o.  male  with B/l Leg wounds of >1 weeks duration. HPI:     Known to this clinic  Skin breakdown/wounds this time every year  Unable to wear compression  So uses ACE wraps    Has few areas both legs  pcp adv him to come here    History/Chart/Medications reviewed    Since last visit:  No new issues    Wound caused by: venous/swelling  Current wound care:See flowsheet  Offloading wound: yes  Elevating legs: yes  Compression compliance:yes  Appetite: good  Wound associated pain: See flowsheet  Diabetic: yes, A1c 12  Smoker: no  ROS: no N/V/D, no T/chills; no local rash, no chest pain or shortness of breath, no headache or dizzyness      Objective:     Physical Exam:   See flowsheet / nursing notes for vitals  Visit Vitals  BP (!) 102/58 (BP 1 Location: Left upper arm, BP Patient Position: At rest;Sitting)   Pulse (!) 55   Temp 98.6 °F (37 °C)   Resp 18   SpO2 98%     General: NAD. Hygiene good  Psych: cooperative. No anxiety or depression. Normal mood and affect. Neuro: alert and oriented to person/place/situation. Otherwise nonfocal.  Derm: Normal  turgor for age, dry skin  HEENT: Normocephalic, atraumatic. EOMI. Conjunctiva clear. No scleral icterus. Neck: Normal range of motion. No masses. Chest: Respirations nonlabored  Lower extremities: hyperpigementedl; temperature normal. Hair growth is not present. Calves are supple, nontender, approximately equally sized in comparison. Focussed Lower Extremity Exam:  Vascular exam:  Right lower extremity: mild  edema, foot warm,   DP pulse : 1+  PT pulse: 0  Nails dystrophic   Left lower extremity: mild  edema, foot warm,   DP pulse : 1+  PT pulse: 0   Nails dystrophic    Ulcer Description:   See Flowsheet           Data Review:                Assessment/Plan     63 y.o. male with Dm2,  venous insuff with recurrent wounds    -right leg ulcer.   Much improved      -left leg ulcer. Much improved    Both are looking better, and almost healed    Following discussed with patient   Needs :      Good local wound care  Dressing: mepilex transfer  Frequency :once weekly  unnas        Patient/ understood and agrees with plan. Questions answered. Follow up with me in 1 week        Procedure:     n/a        -------    Past Medical History:   Diagnosis Date    Chronic venous insufficiency     Diabetes ([facility] [address]     Hypertension     Morbid obesity ([facility] [address]       No past surgical history on file. No family history on file. Social History     Tobacco Use    Smoking status: Never Smoker    Smokeless tobacco: Never Used   Substance Use Topics    Alcohol use: Not Currently       Prior to Admission medications    Medication Sig Start Date End Date Taking? Authorizing Provider   atorvastatin (LIPITOR) 20 mg tablet Take 20 mg by mouth daily. Yes Provider, Historical   lisinopril-hydroCHLOROthiazide (PRINZIDE, ZESTORETIC) 10-12.5 mg per tablet Take 1 Tablet by mouth daily. Yes Provider, Historical   carvediloL (COREG) 25 mg tablet Take 25 mg by mouth two (2) times daily (with meals). Yes Provider, Historical   amLODIPine (NORVASC) 10 mg tablet Take 10 mg by mouth daily. Yes Provider, Historical   indomethacin (INDOCIN) 25 mg capsule Take 25 mg by mouth two (2) times a day. Yes Provider, Historical   insulin glargine (Lantus U-100 Insulin) 100 unit/mL injection 10 Units by SubCUTAneous route nightly. Yes Provider, Historical   furosemide (Lasix) 20 mg tablet Take 20 mg by mouth daily.    Yes Provider, Historical     No Known Allergies       Signed By: Gianluca Deal MD     August 16, 2021

## 2021-08-16 NOTE — DISCHARGE INSTRUCTIONS
Discharge Instructions for  [address]  [address] [facility]  [phone] Fax [phone]    NAME:  Rodrigo Yanes OF BIRTH:  1960  MEDICAL RECORD NUMBER:  [id number]  DATE:  8/16/2021    Wound Cleansing:   Do not scrub or use excessive force. Cleanse wound prior to applying a clean dressing with:  [] Normal Saline [] Keep Wound Dry in Shower    [x] Wound Cleanser   [] Cleanse wound with Mild Soap & Water  [] May Shower at Discharge   [] Other:      Topical Treatments:  Do not apply lotions, creams, or ointments to wound bed unless directed. [] Apply moisturizing lotion to skin surrounding the wound prior to dressing change. [x] Apply antifungal ointment to skin surrounding the wound prior to dressing change.  [] Apply thin film of moisture barrier ointment to skin immediately around wound. [] Other:       Dressings:           Wound Location ***   [x] Apply Primary Dressing:       [] MediHoney Gel [] Alginate with Silver [] Alginate   [] Collagen [] Collagen with Silver   [] Santyl with Moisten saline gauze     [] Hydrocolloid   [] MediHoney Alginate [] Foam with Silver   [] Foam   [] Hydrofera Blue    [] Mepilex Border    [] Moisten with Saline [] Hydrogel [] Mepitel     [] Bactroban/Mupirocin [] Polysporin  [x] Other:   mepilex lite  [] Pack wound loosely with  [] Iodoform   [] Plain Packing  [] Other   [x] Cover and Secure with:     [] Gauze [] Chikis [] Kerlix   [] Ace Wrap [] Cover Roll Tape [] ABD     [] Other:    Avoid contact of tape with skin.   [] Change dressing: [] Daily    [] Every Other Day [] Three times per week   [] Once a week [] Do Not Change Dressing   [x] Other: Page Duel to bilateral lower    Negative Pressure:           Wound Location:   [] Pressure@           mm/Hg  []Continuous []Intermittent   [] Black  [] White Foam [] Other:   []Change dressing: []Three times per week    []Other:     Pressure Relief:  [] When sitting, shift position or do seat lifts every 15 minutes.  [] Wheelchair cushion [] Specialty Bed/Mattress  [] Turn every 2 hours when in bed. Avoid position directing pressure on wound site. Limit side lying to 30 degree tilt. Limit HOB elevation to 30 degrees. Edema Control:  Apply: [] Compression Stocking []Right Leg []Left Leg   [] Tubigrip []Right Leg Double Layer []Left Leg Double Layer       []Right Leg Single Layer []Left Leg Single Layer   [] SpandaGrip []Right Leg  []Left Leg      []Low compression 5-10 mm/Hg      []Medium compression 10-20 mm/Hg     []High compression  20-30 mm/Hg   every morning immediately when getting up should be applied to affected leg(s) from mid foot to knee making sure to cover the heel. Remove every night before going to bed. [] Elevate leg(s) above the level of the heart when sitting. [] Avoid prolonged standing in one place. [] Elevate arm/hand above the level of the heart []RightArm []LeftArm     Compression:  Apply: [] Multilayer Compression Wrap Applied in Clinic []RightLeg []Left Leg   [] Multi-layer compression. Do not get leg(s) with wrap wet. If wraps become too tight call the center or completely remove the wrap. [x] Elevate leg(s) above the level of the heart when sitting. [x] Avoid prolonged standing in one place. Off-Loading:   [x] Off-loading when [x] walking  [x] in bed [x] sitting  [] Total non-weight bearing  [] Right Leg  [] Left Leg   [x] Assistive Device [] Walker [x] Allen Batman  [] Wheelchair  [] Crutches   [] Surgical shoe    [] Podus Boot(s)   [] Foam Boot(s)  [] Roll About    [] Cast Boot [] CROW Boot  [] Other:    Contact Cast:  Apply: [] Total Contact Cast Applied in Clinic []RightLeg []Left Leg   [] Do not get cast wet. Contact center or go to emergency room if there is a foul odor or becomes uncomfortable due to feeling tight or swelling. Do not use objects inside of cast to scratch.       Dietary:  [x] Diet as tolerated: [] Calorie Diabetic Diet: [] No Added Salt:  [] Increase Protein: [] Other:   Activity:  [x] Activity as tolerated:  [] Patient has no activity restrictions     [] Strict Bedrest: [] Remain off Work:     [] May return to full duty work:                                   [] Return to work with restrictions:             Return Appointment:  [x] Wound and dressing supply provider:   [] ECF or Home Healthcare:  [x] Wound Assessment: [x] Physician or NP scheduled for Wound Assessment:   [x] Return Appointment: 1 Week(s)  [] Ordered tests:      Electronically signed Cecy Haile LPN on 4/71/1115 at 8:19 PM     [facility] Information: Should you experience any significant changes in your wound(s) or have questions about your wound care, please contact the [address] [place] [address] at [address] [place] [address] 8:00 am - 4:30. If you need help with your wound outside these hours and cannot wait until we are again available, contact your PCP or go to the hospital emergency room. PLEASE NOTE: IF YOU ARE UNABLE TO OBTAIN WOUND SUPPLIES, CONTINUE TO USE THE SUPPLIES YOU HAVE AVAILABLE UNTIL YOU ARE ABLE TO REACH US. IT IS MOST IMPORTANT TO KEEP THE WOUND COVERED AT ALL TIMES.      Physician Signature:_______________________    Date: ___________ Time:  ____________

## 2021-08-19 NOTE — WOUND CARE
08/09/21 1048   Wound Leg lower Left;Right skin changes from venous insufficiencies/ bandage associated trauma   Date First Assessed/Time First Assessed: 07/12/21 1146   Present on Hospital Admission: Yes  Primary Wound Type: Venous  Location: Leg lower  Wound Location Orientation: Left;Right  Wound Description: skin changes from venous insufficiencies/ bandage . .. Wound Image     Wound Etiology Traumatic   Cleansed Soap and water   Dressing/Treatment   (skin moisturizer)   Dressing Change Due 08/16/21   Wound Length (cm)   (scattered open areas)   Drainage Amount Scant   Wound Odor None   Urszula-Wound/Incision Assessment Dry/flaky   Edges Attached edges   Wound Thickness Description Partial thickness     Unna Boot Application   Below Knee    NAME:  Kay Patterson  YOB: 1960  MEDICAL RECORD NUMBER:  [id number]  DATE:  8/9/2021    Remove old Unna Boot or Boots. Applied moisturizing agent to dry skin as needed. Appied primary and secondary dressing as ordered. Applied Unna roll from toes to knee overlapping each time. Applied ace wrap or coban from toes to below the knee. Secured with tape and/or metal clips covered with tape. Instructed patient/caregiver to keep dressing dry and intact. DO NOT REMOVE DRESSING. Instructed pt/family/caregiver to report excessive draining, loose bandage, wet dressing, severe pain or tingling in toes. Applied [employer] dressing below the knee to bilateral lower legs. Unna Boot(s) were applied per  Guidelines.     Response to treatment: Well tolerated by patient      Electronically signed by Ankur Marin RN on 8/9/2021 at 12:00 PM

## 2021-08-30 ENCOUNTER — HOSPITAL ENCOUNTER (OUTPATIENT)
Dept: WOUND CARE | Age: 61
Discharge: HOME | End: 2021-08-30
Attending: FAMILY MEDICINE
Payer: COMMERCIAL

## 2021-08-30 PROCEDURE — 99213 OFFICE O/P EST LOW 20 MIN: CPT

## 2021-08-30 NOTE — WOUND CARE
Wound Center  Progress Note     Subjective:     Chief Complaint:  Riley Barahona is a 63 y.o.  male  with B/l Leg wounds of >1 weeks duration. HPI:     Known to this clinic  Skin breakdown/wounds this time every year  Unable to wear compression  So uses ACE wraps    Has few areas both legs  pcp adv him to come here    History/Chart/Medications reviewed    Since last visit:  No new issues  Didn't come last week    Wound caused by: venous/swelling  Current wound care:See flowsheet  Offloading wound: yes  Elevating legs: yes  Compression compliance:yes  Appetite: good  Wound associated pain: See flowsheet  Diabetic: yes, A1c 12  Smoker: no  ROS: no N/V/D, no T/chills; no local rash, no chest pain or shortness of breath, no headache or dizzyness      Objective:     Physical Exam:   See flowsheet / nursing notes for vitals  There were no vitals taken for this visit. General: NAD. Hygiene good  Psych: cooperative. No anxiety or depression. Normal mood and affect. Neuro: alert and oriented to person/place/situation. Otherwise nonfocal.  Derm: Normal  turgor for age, dry skin  HEENT: Normocephalic, atraumatic. EOMI. Conjunctiva clear. No scleral icterus. Neck: Normal range of motion. No masses. Chest: Respirations nonlabored  Lower extremities: hyperpigementedl; temperature normal. Hair growth is not present. Calves are supple, nontender, approximately equally sized in comparison. Focussed Lower Extremity Exam:  Vascular exam:  Right lower extremity: mild  edema, foot warm,   DP pulse : 1+  PT pulse: 0  Nails dystrophic   Left lower extremity: mild  edema, foot warm,   DP pulse : 1+  PT pulse: 0   Nails dystrophic    Ulcer Description:   See Flowsheet           Data Review:                Assessment/Plan     63 y.o. male with Dm2,  venous insuff with recurrent wounds    -right leg ulcer. epithilealized      -left leg ulcer.   epithilealized      Prevention discussed  Wanted wraps put on again- as no open areas that's not the solution  Needs Compression stockings    Compression socks given to patient 20-30 mmg Hg  Attempted donning them on- patient didn't tolerate even until ankle  tubis x 2 applied    Follow up prn        Procedure:     n/a        -------    Past Medical History:   Diagnosis Date    Chronic venous insufficiency     Diabetes ([facility] [facility]     Hypertension     Morbid obesity ([facility] [address]       No past surgical history on file. No family history on file. Social History     Tobacco Use    Smoking status: Never Smoker    Smokeless tobacco: Never Used   Substance Use Topics    Alcohol use: Not Currently       Prior to Admission medications    Medication Sig Start Date End Date Taking? Authorizing Provider   atorvastatin (LIPITOR) 20 mg tablet Take 20 mg by mouth daily. Provider, Historical   lisinopril-hydroCHLOROthiazide (PRINZIDE, ZESTORETIC) 10-12.5 mg per tablet Take 1 Tablet by mouth daily. Provider, Historical   carvediloL (COREG) 25 mg tablet Take 25 mg by mouth two (2) times daily (with meals). Provider, Historical   amLODIPine (NORVASC) 10 mg tablet Take 10 mg by mouth daily. Provider, Historical   indomethacin (INDOCIN) 25 mg capsule Take 25 mg by mouth two (2) times a day. Provider, Historical   insulin glargine (Lantus U-100 Insulin) 100 unit/mL injection 10 Units by SubCUTAneous route nightly. Provider, Historical   furosemide (Lasix) 20 mg tablet Take 20 mg by mouth daily.     Provider, Historical     No Known Allergies       Signed By: Steffen Riojas MD     August 30, 2021

## 2021-09-20 NOTE — DISCHARGE INSTRUCTIONS
Discharge Instructions for  [address]  [address] [place], [facility]  [phone] Fax [phone]    NAME:  Jean Paul Pino  YOB: 1960  MEDICAL RECORD NUMBER:  [id number]  DATE:  8/30/2021    Topical Treatments:  Do not apply lotions, creams, or ointments to wound bed unless directed. [] Apply moisturizing lotion to skin surrounding the wound prior to dressing change.  [] Apply antifungal ointment to skin surrounding the wound prior to dressing change.  [] Apply thin film of moisture barrier ointment to skin immediately around wound. [x] Other: moisturizer to bilateral legs      Dressings:           Wound Location Bilateral legs    [] Apply Primary Dressing:       [] MediHoney Gel [] Alginate with Silver [] Alginate   [] Collagen [] Collagen with Silver   [] Santyl with Moisten saline gauze     [] Hydrocolloid   [] MediHoney Alginate [] Foam with Silver   [] Foam   [] Hydrofera Blue    [] Mepilex Border    [] Moisten with Saline [] Hydrogel [] Mepitel     [] Bactroban/Mupirocin [] Polysporin  [] Other:    [] Pack wound loosely with  [] Iodoform   [] Plain Packing  [] Other   [] Cover and Secure with:     [] Gauze [] Chikis [] Kerlix   [] Ace Wrap [] Cover Roll Tape [] ABD     [] Other:    Avoid contact of tape with skin. [] Change dressing: [] Daily    [] Every Other Day [] Three times per week   [] Once a week [] Do Not Change Dressing   [x] Other: No dressings        Edema Control:  Apply: [x] Compression Stocking [x]Right Leg [x]Left Leg   [] Tubigrip []Right Leg Double Layer []Left Leg Double Layer       []Right Leg Single Layer []Left Leg Single Layer   [] SpandaGrip []Right Leg  []Left Leg      []Low compression 5-10 mm/Hg      []Medium compression 10-20 mm/Hg     []High compression  20-30 mm/Hg   every morning immediately when getting up should be applied to affected leg(s) from mid foot to knee making sure to cover the heel.   Remove every night before going to bed. [] Elevate leg(s) above the level of the heart when sitting. [] Avoid prolonged standing in one place. [] Elevate arm/hand above the level of the heart []RightArm []LeftArm       Dietary:  [] Diet as tolerated: [x] Calorie Diabetic Diet: [] No Added Salt:  [] Increase Protein: [] Other:   Activity:  [x] Activity as tolerated:  [] Patient has no activity restrictions     [] Strict Bedrest: [] Remain off Work:     [] May return to full duty work:                                   [] Return to work with restrictions:               [facility] Information: Should you experience any significant changes in your wound(s) or have questions about your wound care, please contact the [facility] at [address] [place] [address] Street 8:00 am - 4:30. If you need help with your wound outside these hours and cannot wait until we are again available, contact your PCP or go to the hospital emergency room. PLEASE NOTE: IF YOU ARE UNABLE TO OBTAIN WOUND SUPPLIES, CONTINUE TO USE THE SUPPLIES YOU HAVE AVAILABLE UNTIL YOU ARE ABLE TO REACH US. IT IS MOST IMPORTANT TO KEEP THE WOUND COVERED AT ALL TIMES.

## 2021-09-20 NOTE — WOUND CARE
08/30/21 1323   Wound Leg lower Left;Right skin changes from venous insufficiencies/ bandage associated trauma   Date First Assessed/Time First Assessed: 07/12/21 1146   Present on Hospital Admission: Yes  Primary Wound Type: Venous  Location: Leg lower  Wound Location Orientation: Left;Right  Wound Description: skin changes from venous insufficiencies/ bandage . ..    Wound Length (cm)   (scattered open areas)   Drainage Amount Scant   Drainage Description Thin;Serous   Wound Odor None   Urszula-Wound/Incision Assessment Dry/flaky   Edges Attached edges   Wound Thickness Description Full thickness

## 2022-03-18 PROBLEM — I83.029 VENOUS ULCER OF LEFT LEG (HCC): Status: ACTIVE | Noted: 2020-08-11

## 2022-03-18 PROBLEM — I83.019 VENOUS ULCER OF RIGHT LEG (HCC): Status: ACTIVE | Noted: 2020-08-11

## 2022-03-18 PROBLEM — L97.929 VENOUS ULCER OF LEFT LEG (HCC): Status: ACTIVE | Noted: 2020-08-11

## 2022-03-18 PROBLEM — L97.919 VENOUS ULCER OF RIGHT LEG (HCC): Status: ACTIVE | Noted: 2020-08-11

## 2022-06-30 ENCOUNTER — HOSPITAL ENCOUNTER (OUTPATIENT)
Dept: WOUND CARE | Age: 62
Discharge: HOME | End: 2022-06-30
Attending: INTERNAL MEDICINE
Payer: MEDICARE

## 2022-06-30 VITALS
SYSTOLIC BLOOD PRESSURE: 108 MMHG | TEMPERATURE: 98.7 F | OXYGEN SATURATION: 96 % | HEART RATE: 57 BPM | RESPIRATION RATE: 16 BRPM | DIASTOLIC BLOOD PRESSURE: 61 MMHG

## 2022-06-30 PROCEDURE — 29581 APPL MULTLAYER CMPRN SYS LEG: CPT

## 2022-06-30 NOTE — WOUND CARE
[address]   Progress Note and Procedure Note   NO Procedure      Austin Solis RECORD NUMBER:  [id number]  AGE: 63 y.o. RACE BLACK/  GENDER: male  : 1960  EPISODE DATE:  2022    Subjective:     C/C: bilateral leg wound     HISTORY of PRESENT ILLNESS HPI    [email or web address] Sae Lloyd is a 63 y.o. male who presents today for wound/ulcer evaluation. History of Wound Context: due to edema  Wound/Ulcer Pain Timing/Severity: mild  Quality of pain: burning  Severity:  2 / 10   Modifying Factors: None  Associated Signs/Symptoms: edema    Ulcer Identification:  Ulcer Type: venous and diabetic    Contributing Factors: edema and diabetes    Wound: recurrent leg wounds due to edema, suspected venous. No signs of inflammation/infection. PAST MEDICAL HISTORY    Past Medical History:   Diagnosis Date    Chronic venous insufficiency     Diabetes ([facility] [address]     Hypertension     Morbid obesity ([facility] [address]         PAST SURGICAL HISTORY    No past surgical history on file. FAMILY HISTORY    No family history on file. SOCIAL HISTORY    Social History     Tobacco Use    Smoking status: Never Smoker    Smokeless tobacco: Never Used   Substance Use Topics    Alcohol use: Not Currently    Drug use: Not Currently       ALLERGIES    No Known Allergies    MEDICATIONS    Current Outpatient Medications on File Prior to Encounter   Medication Sig Dispense Refill    atorvastatin (LIPITOR) 20 mg tablet Take 20 mg by mouth daily.  lisinopril-hydroCHLOROthiazide (PRINZIDE, ZESTORETIC) 10-12.5 mg per tablet Take 1 Tablet by mouth daily.  carvediloL (COREG) 25 mg tablet Take 25 mg by mouth two (2) times daily (with meals).  amLODIPine (NORVASC) 10 mg tablet Take 10 mg by mouth daily.  indomethacin (INDOCIN) 25 mg capsule Take 25 mg by mouth two (2) times a day.       insulin glargine (Lantus U-100 Insulin) 100 unit/mL injection 10 Units by SubCUTAneous route nightly.  furosemide (Lasix) 20 mg tablet Take 20 mg by mouth daily. No current facility-administered medications on file prior to encounter. REVIEW OF SYSTEMS    No F/C/R  No N/V  No cough/sob/chest pain  No abd pain/cramps or diarrhea  No joint swelling/pain  No itching or rash    Objective:     Visit Vitals  /61 (BP 1 Location: Left upper arm, BP Patient Position: Sitting)   Pulse (!) 57   Temp 98.7 °F (37.1 °C)   Resp 16   SpO2 96%       Wt Readings from Last 3 Encounters:   No data found for Wt       PHYSICAL EXAM     GEN: WD Obese, RA-not in resp distress. HEENT: Unicteric. CHEST: Non laboured breathing  ABD: Obese/soft. Non tender. DAYAMI: Deferred  Skin: See wound care nurses note. CNS: A, OX3. Moves all extremity. CN grossly ok. Assessment:      Compression treatment    Procedure Note  Indications:  Based on my examination of this patient's wound(s)/ulcer(s) today, debridement is not required to promote healing and evaluate the wound base. Wound Leg lower Left 01/31/20 (Active)   Number of days: 876       Wound Leg lower Right (Active)   Number of days: 881       Wound Leg lower Left 03/23/21 (Active)   Number of days: 464       Wound Leg lower Right 03/23/21 (Active)   Number of days: 464       Wound Leg Left;Lateral 06/30/22 (Active)   Wound Image   06/30/22 1101   Wound Etiology Venous 06/30/22 1101   Dressing Status Breakthrough drainage noted 06/30/22 1101   Cleansed Wound cleanser; Soap and water 06/30/22 1101   Dressing/Treatment Alginate;Collagen with Ag;Gauze dressing/dressing sponge;Moisturizing cream;Roll gauze 06/30/22 1101   Dressing Change Due 07/07/22 06/30/22 1101   Wound Length (cm) 4 cm 06/30/22 1101   Wound Width (cm) 3 cm 06/30/22 1101   Wound Depth (cm) 0.1 cm 06/30/22 1101   Wound Surface Area (cm^2) 12 cm^2 06/30/22 1101   Wound Volume (cm^3) 1.2 cm^3 06/30/22 1101   Wound Assessment Pink/red 06/30/22 1101   Drainage Amount Scant 06/30/22 1101   Drainage Description Serous 06/30/22 1101   Wound Odor None 06/30/22 1101   Urszula-Wound/Incision Assessment Dry/flaky;Edematous 06/30/22 1101   Edges Attached edges 06/30/22 1101   Wound Thickness Description Partial thickness 06/30/22 1101   Number of days: 0       Wound Leg Right;Medial 06/30/22 (Active)   Wound Image   06/30/22 1101   Wound Etiology Venous 06/30/22 1101   Dressing Status Breakthrough drainage noted 06/30/22 1101   Cleansed Wound cleanser 06/30/22 1101   Dressing/Treatment Alginate;Collagen with Ag;Dry dressing;Moisture barrier;Roll gauze 06/30/22 1101   Dressing Change Due 07/07/22 06/30/22 1101   Wound Length (cm) 2.5 cm 06/30/22 1101   Wound Width (cm) 2 cm 06/30/22 1101   Wound Depth (cm) 0.1 cm 06/30/22 1101   Wound Surface Area (cm^2) 5 cm^2 06/30/22 1101   Wound Volume (cm^3) 0.5 cm^3 06/30/22 1101   Wound Assessment Pink/red 06/30/22 1101   Drainage Amount Small 06/30/22 1101   Drainage Description Serous 06/30/22 1101   Wound Odor None 06/30/22 1101   Urszula-Wound/Incision Assessment Dry/flaky;Edematous 06/30/22 1101   Edges Attached edges 06/30/22 1101   Wound Thickness Description Partial thickness 06/30/22 1101   Number of days: 0       Wound Leg Right;Lateral 06/30/22 (Active)   Wound Image   06/30/22 1101   Wound Etiology Venous 06/30/22 1101   Dressing Status Breakthrough drainage noted 06/30/22 1101   Cleansed Wound cleanser 06/30/22 1101   Dressing/Treatment Alginate;Collagen with Ag;Gauze dressing/dressing sponge;Moisturizing cream;Roll gauze 06/30/22 1101   Dressing Change Due 07/07/22 06/30/22 1101   Wound Length (cm) 7 cm 06/30/22 1101   Wound Width (cm) 5 cm 06/30/22 1101   Wound Depth (cm) 0.1 cm 06/30/22 1101   Wound Surface Area (cm^2) 35 cm^2 06/30/22 1101   Wound Volume (cm^3) 3.5 cm^3 06/30/22 1101   Wound Assessment Pink/red 06/30/22 1101   Drainage Amount Moderate 06/30/22 1101   Drainage Description Serous 06/30/22 1101   Wound Odor None 06/30/22 1101 Urszula-Wound/Incision Assessment Dry/flaky;Edematous 06/30/22 1101   Edges Attached edges 06/30/22 1101   Wound Thickness Description Partial thickness 06/30/22 1101   Number of days: 0        Plan:     Treatment Note please see attached Discharge Instructions    Written patient dismissal instructions given to patient or POA.          Electronically signed by Catrina Cannon MD on 6/30/2022 at 4:27 PM

## 2022-06-30 NOTE — WOUND CARE
06/30/22 1101   Wound Leg Left;Lateral 06/30/22   Date First Assessed/Time First Assessed: 06/30/22 1055   Primary Wound Type: Venous Ulcer  Location: Leg  Wound Location Orientation: Left;Lateral  Date of First Observation: 06/30/22   Wound Image    Wound Etiology Venous   Dressing Status Breakthrough drainage noted   Cleansed Wound cleanser; Soap and water   Dressing/Treatment Alginate;Collagen with Ag;Gauze dressing/dressing sponge;Moisturizing cream;Roll gauze  (2 layer wrap)   Dressing Change Due 07/07/22   Wound Length (cm) 4 cm   Wound Width (cm) 3 cm   Wound Depth (cm) 0.1 cm   Wound Surface Area (cm^2) 12 cm^2   Wound Volume (cm^3) 1.2 cm^3   Wound Assessment Pink/red   Drainage Amount Scant   Drainage Description Serous   Wound Odor None   Urszula-Wound/Incision Assessment Dry/flaky;Edematous   Edges Attached edges   Wound Thickness Description Partial thickness   Wound Leg Right;Medial 06/30/22   Date First Assessed/Time First Assessed: 06/30/22 1100   Primary Wound Type: Venous Ulcer  Location: Leg  Wound Location Orientation: Right;Medial  Date of First Observation: 06/30/22   Wound Image    Wound Etiology Venous   Dressing Status Breakthrough drainage noted   Cleansed Wound cleanser   Dressing/Treatment Alginate;Collagen with Ag;Dry dressing;Moisture barrier;Roll gauze  (2 layer wrap)   Dressing Change Due 07/07/22   Wound Length (cm) 2.5 cm   Wound Width (cm) 2 cm   Wound Depth (cm) 0.1 cm   Wound Surface Area (cm^2) 5 cm^2   Wound Volume (cm^3) 0.5 cm^3   Wound Assessment Pink/red   Drainage Amount Small   Drainage Description Serous   Wound Odor None   Urszula-Wound/Incision Assessment Dry/flaky;Edematous   Edges Attached edges   Wound Thickness Description Partial thickness   Wound Leg Right;Lateral 06/30/22   Date First Assessed/Time First Assessed: 06/30/22 1100   Primary Wound Type: Venous Ulcer  Location: Leg  Wound Location Orientation: Right;Lateral  Date of First Observation: 06/30/22   Wound Image    Wound Etiology Venous   Dressing Status Breakthrough drainage noted   Cleansed Wound cleanser   Dressing/Treatment Alginate;Collagen with Ag;Gauze dressing/dressing sponge;Moisturizing cream;Roll gauze  (2 layer wrap)   Dressing Change Due 07/07/22   Wound Length (cm) 7 cm   Wound Width (cm) 5 cm   Wound Depth (cm) 0.1 cm   Wound Surface Area (cm^2) 35 cm^2   Wound Volume (cm^3) 3.5 cm^3   Wound Assessment Pink/red   Drainage Amount Moderate   Drainage Description Serous   Wound Odor None   Urszula-Wound/Incision Assessment Dry/flaky;Edematous   Edges Attached edges   Wound Thickness Description Partial thickness     Multilayer Compression Wrap   (Not Unna) Below the Knee    NAME:  Avi Galvez  YOB: 1960  MEDICAL RECORD NUMBER:  [id number]  DATE:  6/30/2022    Removed old Multilayer wrap if indicated and wash leg with mild soap/water. Applied moisturizing agent to dry skin as needed. Applied primary and secondary dressing as ordered. Applied multilayered dressing below the knee to right lower leg. Applied multilayered dressing below the knee to left lower leg. Instructed patient/caregiver not to remove dressing and to keep it clean and dry. Instructed patient/caregiver on complications to report to provider, such as pain, numbness in toes, heavy drainage, and slippage of dressing. Instructed patient on purpose of compression dressing and on activity and exercise recommendations.     Response to treatment: Well tolerated by patient       Electronically signed by Kenny Liu RN on 6/30/2022 at 1:49 PM

## 2022-07-07 ENCOUNTER — HOSPITAL ENCOUNTER (OUTPATIENT)
Dept: WOUND CARE | Age: 62
Discharge: HOME | End: 2022-07-07
Attending: INTERNAL MEDICINE
Payer: MEDICARE

## 2022-07-07 VITALS — DIASTOLIC BLOOD PRESSURE: 62 MMHG | HEART RATE: 64 BPM | SYSTOLIC BLOOD PRESSURE: 107 MMHG

## 2022-07-07 DIAGNOSIS — L97.929 VENOUS ULCER OF LEFT LEG (HCC): Primary | ICD-10-CM

## 2022-07-07 DIAGNOSIS — I83.029 VENOUS ULCER OF LEFT LEG (HCC): Primary | ICD-10-CM

## 2022-07-07 DIAGNOSIS — L97.919 VENOUS ULCER OF RIGHT LEG (HCC): ICD-10-CM

## 2022-07-07 DIAGNOSIS — I83.019 VENOUS ULCER OF RIGHT LEG (HCC): ICD-10-CM

## 2022-07-07 DIAGNOSIS — E11.9 TYPE 2 DIABETES MELLITUS WITHOUT COMPLICATION, WITHOUT LONG-TERM CURRENT USE OF INSULIN (HCC): ICD-10-CM

## 2022-07-07 DIAGNOSIS — I87.2 CHRONIC VENOUS INSUFFICIENCY: ICD-10-CM

## 2022-07-07 PROBLEM — I89.0 LYMPHEDEMA: Status: ACTIVE | Noted: 2022-07-07

## 2022-07-07 PROBLEM — Z87.39 HISTORY OF GOUT: Status: ACTIVE | Noted: 2022-07-07

## 2022-07-07 PROCEDURE — 29581 APPL MULTLAYER CMPRN SYS LEG: CPT

## 2022-07-07 NOTE — WOUND CARE
Compression [address] f: [phone] [phone] [email or web address]     Ordering Center:     [facility] OP WOUND [facility]  [address] [id number] [phone]  WOUND CARE [unfilled] [phone]  FAX NUMBER [unfilled]    Patient Information:      Kori Jara  [address] [facility] [address]   [unfilled]  [phone]  : 1960  AGE: 63 y.o. GENDER: male   TODAYS DATE:  2022    Insurance:      PRIMARY INSURANCE:  [id number] - (Medicaid)    Payor/Plan Subscr  Sex Relation Sub. Ins. ID Effective Group Num   1. BLUE CROSS - * RAFA STARR 1960 Male Self [id number]                                   [address] [address]   2.  AETNA BETTER * RAFA STARR RENU 1960 Male Self [id number] 3/1/20                                    [address] [address]       Patient Wound Information:      Problem List Items Addressed This Visit        Circulatory    * (Principal) Chronic venous insufficiency    Relevant Orders    INITIATE OUTPATIENT WOUND CARE PROTOCOL       Endocrine    Diabetes ([facility] [address]    Relevant Orders    INITIATE OUTPATIENT WOUND CARE PROTOCOL       Integumentary    Venous ulcer of left leg ([facility] [address] - Primary    Relevant Orders    INITIATE OUTPATIENT WOUND CARE PROTOCOL    Venous ulcer of right leg (HCC)    Relevant Orders    INITIATE OUTPATIENT WOUND CARE PROTOCOL          WOUNDS REQUIRING DRESSING SUPPLIES:     Wound Leg lower Left 20 (Active)   Number of days: 883       Wound Leg lower Right (Active)   Number of days: 888       Wound Leg lower Left 21 (Active)   Number of days: 471       Wound Leg lower Right 21 (Active)   Number of days: 471       Wound Leg Left;Lateral 22 (Active)   Wound Image    22 1046   Wound Etiology Venous 22 1046   Dressing Status Intact 22 1046   Cleansed Wound cleanser 22 1046   Dressing/Treatment Alginate with Ag 07/07/22 1046   Dressing Change Due 07/13/22 07/07/22 1046   Wound Length (cm) 0.5 cm 07/07/22 1046   Wound Width (cm) 0.5 cm 07/07/22 1046   Wound Depth (cm) 0.1 cm 07/07/22 1046   Wound Surface Area (cm^2) 0.25 cm^2 07/07/22 1046   Change in Wound Size % 97.92 07/07/22 1046   Wound Volume (cm^3) 0.025 cm^3 07/07/22 1046   Wound Healing % 98 07/07/22 1046   Wound Assessment Epithelialization;Pink/red 07/07/22 1046   Drainage Amount Scant 07/07/22 1046   Drainage Description Serous 07/07/22 1046   Wound Odor None 07/07/22 1046   Urszula-Wound/Incision Assessment Dry/flaky;Edematous 07/07/22 1046   Edges Attached edges 07/07/22 1046   Wound Thickness Description Partial thickness 07/07/22 1046   Number of days: 7       Wound Leg Right;Medial 06/30/22 (Active)   Wound Image   07/07/22 1046   Wound Etiology Venous 07/07/22 1046   Dressing Status Intact 07/07/22 1046   Cleansed Wound cleanser 07/07/22 1046   Dressing/Treatment Alginate with Ag 07/07/22 1046   Dressing Change Due 07/13/22 07/07/22 1046   Wound Length (cm) 0.5 cm 07/07/22 1046   Wound Width (cm) 0.5 cm 07/07/22 1046   Wound Depth (cm) 0.1 cm 07/07/22 1046   Wound Surface Area (cm^2) 0.25 cm^2 07/07/22 1046   Change in Wound Size % 95 07/07/22 1046   Wound Volume (cm^3) 0.025 cm^3 07/07/22 1046   Wound Healing % 95 07/07/22 1046   Wound Assessment Epithelialization;Pink/red 07/07/22 1046   Drainage Amount Scant 07/07/22 1046   Drainage Description Serous 07/07/22 1046   Wound Odor None 07/07/22 1046   Urszula-Wound/Incision Assessment Dry/flaky;Edematous 07/07/22 1046   Edges Attached edges 07/07/22 1046   Wound Thickness Description Partial thickness 07/07/22 1046   Number of days: 7       Wound Leg Right;Lateral 06/30/22 (Active)   Wound Image   07/07/22 1046   Wound Etiology Venous 07/07/22 1046   Dressing Status Intact 07/07/22 1046   Cleansed Wound cleanser 07/07/22 1046   Dressing/Treatment Alginate with Ag;Collagen 07/07/22 1046 Dressing Change Due 07/13/22 07/07/22 1046   Wound Length (cm) 4 cm 07/07/22 1046   Wound Width (cm) 5 cm 07/07/22 1046   Wound Depth (cm) 0.1 cm 07/07/22 1046   Wound Surface Area (cm^2) 20 cm^2 07/07/22 1046   Change in Wound Size % 42.86 07/07/22 1046   Wound Volume (cm^3) 2 cm^3 07/07/22 1046   Wound Healing % 43 07/07/22 1046   Wound Assessment Epithelialization;Pink/red 07/07/22 1046   Drainage Amount Small 07/07/22 1046   Drainage Description Serous 07/07/22 1046   Wound Odor None 07/07/22 1046   Urszula-Wound/Incision Assessment Dry/flaky;Edematous 07/07/22 1046   Edges Attached edges 07/07/22 1046   Wound Thickness Description Partial thickness 07/07/22 1046   Number of days: 7        Right Leg Measurements: (ALL measurements are in cm) 42 cm (length)                                                                                                       39 cm (calf)                                                                                                       27.5 cm (ankle)       Left Leg Measurements: (ALL measurements are in cm) 42 cm (length)                                                                                                    41 cm (calf)                                                                                                    30.5 cm (ankle)        Supplies Requested :     Medicare Requirements  Patient must have a qualifying Active Venus Ulcer if ordering Bilateral Compression Wounds MUST be present on both legs for Medicare Coverage. The patient can Not be on home health or have had a Medicare part A stay in the past 24 hours. Patient Wound(s) Debrided: [x] Yes 7/6/22       Debribement Type: Mechanical     Patient currently being seen by Home Health: [] Yes   [x] No     Compression Type:  Other [address]    Provider Information:      PROVIDER'S NAMEBess Montague    NPI:  [id number]

## 2022-07-07 NOTE — WOUND CARE
07/07/22 1046   Wound Leg Right;Lateral 06/30/22   Date First Assessed/Time First Assessed: 06/30/22 1100   Primary Wound Type: Venous Ulcer  Location: Leg  Wound Location Orientation: Right;Lateral  Date of First Observation: 06/30/22   Wound Image    Wound Etiology Venous   Dressing Status Intact   Cleansed Wound cleanser   Dressing/Treatment Alginate with Ag;Collagen  (2 layer wrap)   Dressing Change Due 07/13/22   Wound Length (cm) 4 cm   Wound Width (cm) 5 cm   Wound Depth (cm) 0.1 cm   Wound Surface Area (cm^2) 20 cm^2   Change in Wound Size % 42.86   Wound Volume (cm^3) 2 cm^3   Wound Healing % 43   Wound Assessment Epithelialization;Pink/red   Drainage Amount Small   Drainage Description Serous   Wound Odor None   Urszula-Wound/Incision Assessment Dry/flaky;Edematous   Edges Attached edges   Wound Thickness Description Partial thickness   Wound Leg Right;Medial 06/30/22   Date First Assessed/Time First Assessed: 06/30/22 1100   Primary Wound Type: Venous Ulcer  Location: Leg  Wound Location Orientation: Right;Medial  Date of First Observation: 06/30/22   Wound Image    Wound Etiology Venous   Dressing Status Intact   Cleansed Wound cleanser   Dressing/Treatment Alginate with Ag  (2 layeer wrap)   Dressing Change Due 07/13/22   Wound Length (cm) 0.5 cm   Wound Width (cm) 0.5 cm   Wound Depth (cm) 0.1 cm   Wound Surface Area (cm^2) 0.25 cm^2   Change in Wound Size % 95   Wound Volume (cm^3) 0.025 cm^3   Wound Healing % 95   Wound Assessment Epithelialization;Pink/red   Drainage Amount Scant   Drainage Description Serous   Wound Odor None   Usrzula-Wound/Incision Assessment Dry/flaky;Edematous   Edges Attached edges   Wound Thickness Description Partial thickness   Wound Leg Left;Lateral 06/30/22   Date First Assessed/Time First Assessed: 06/30/22 1055   Primary Wound Type: Venous Ulcer  Location: Leg  Wound Location Orientation: Left;Lateral  Date of First Observation: 06/30/22   Wound Image     Wound Etiology Venous   Dressing Status Intact   Cleansed Wound cleanser   Dressing/Treatment Alginate with Ag  (2 layer wrap)   Dressing Change Due 07/13/22   Wound Length (cm) 0.5 cm   Wound Width (cm) 0.5 cm   Wound Depth (cm) 0.1 cm   Wound Surface Area (cm^2) 0.25 cm^2   Change in Wound Size % 97.92   Wound Volume (cm^3) 0.025 cm^3   Wound Healing % 98   Wound Assessment Epithelialization;Pink/red   Drainage Amount Scant   Drainage Description Serous   Wound Odor None   Urszula-Wound/Incision Assessment Dry/flaky;Edematous   Edges Attached edges   Wound Thickness Description Partial thickness     Leg Measurements - (R) Length -42 cm                                             Calf  - 39 cm                                             Ankle - 27.5                                           (L) Length - 42 cm                                            Calf - 41 cm                                            Ankle - 30.5      Multilayer Compression Wrap   (Not Unna) Below the Knee    NAME:  Brigitte Ledbetter  YOB: 1960  MEDICAL RECORD NUMBER:  [id number]  DATE:  7/7/2022    Removed old Multilayer wrap if indicated and wash leg with mild soap/water. Applied moisturizing agent to dry skin as needed. Applied primary and secondary dressing as ordered. Applied multilayered dressing below the knee to right lower leg. Applied multilayered dressing below the knee to left lower leg. Instructed patient/caregiver not to remove dressing and to keep it clean and dry. Instructed patient/caregiver on complications to report to provider, such as pain, numbness in toes, heavy drainage, and slippage of dressing. Instructed patient on purpose of compression dressing and on activity and exercise recommendations.     Response to treatment: Well tolerated by patient       Electronically signed by Pop Segal RN on 7/7/2022 at 1:40 PM

## 2022-07-07 NOTE — WOUND CARE
[address]   Progress Note and Procedure Note   NO Procedure      Austin Solis RECORD NUMBER:  [id number]  AGE: 63 y.o. RACE BLACK/  GENDER: male  : 1960  EPISODE DATE:  2022    Subjective:     C/C: bilateral leg wound from venous insufficency     HISTORY of PRESENT ILLNESS HPI    Morgan@[employer] Keturah Lee is a 63 y.o. male who presents today for wound/ulcer evaluation. History of Wound Context: due to edema  Wound/Ulcer Pain Timing/Severity: mild  Quality of pain: burning  Severity:  2 / 10   Modifying Factors: None  Associated Signs/Symptoms: edema    Ulcer Identification:  Ulcer Type: venous and diabetic  Contributing Factors: edema and diabetes    Wound: recurrent leg wounds due to edema, suspected venous. No signs of inflammation/infection. Today: feels well. Doing better with leg swelling- from the wrapping. Lymphedema pump ordered for him-waiting arrival  Denies fever, chills, rigors, shortness of breath or cough. No nausea, vomiting, abdominal pain, diarrhea, rash. PAST MEDICAL HISTORY    Past Medical History:   Diagnosis Date    Chronic venous insufficiency     Diabetes ([facility] [address]     Hypertension     Morbid obesity ([facility] [address]         PAST SURGICAL HISTORY    No past surgical history on file. FAMILY HISTORY    No family history on file. SOCIAL HISTORY    Social History     Tobacco Use    Smoking status: Never Smoker    Smokeless tobacco: Never Used   Substance Use Topics    Alcohol use: Not Currently    Drug use: Not Currently       ALLERGIES    No Known Allergies    MEDICATIONS    Current Outpatient Medications on File Prior to Encounter   Medication Sig Dispense Refill    atorvastatin (LIPITOR) 20 mg tablet Take 20 mg by mouth daily.  lisinopril-hydroCHLOROthiazide (PRINZIDE, ZESTORETIC) 10-12.5 mg per tablet Take 1 Tablet by mouth daily.       carvediloL (COREG) 25 mg tablet Take 25 mg by mouth two (2) times daily (with meals).  amLODIPine (NORVASC) 10 mg tablet Take 10 mg by mouth daily.  indomethacin (INDOCIN) 25 mg capsule Take 25 mg by mouth two (2) times a day.  insulin glargine (Lantus U-100 Insulin) 100 unit/mL injection 10 Units by SubCUTAneous route nightly.  furosemide (Lasix) 20 mg tablet Take 20 mg by mouth daily. No current facility-administered medications on file prior to encounter. REVIEW OF SYSTEMS    No F/C/R  No N/V  No cough/sob/chest pain  No abd pain/cramps or diarrhea  No joint swelling/pain  No itching or rash    Objective:     Visit Vitals  BP (P) 107/62 (BP 1 Location: Left upper arm, BP Patient Position: Sitting)   Pulse 64   Temp (P) 98.2 °F (36.8 °C)   Resp (P) 16   SpO2 (P) 97%       Wt Readings from Last 3 Encounters:   No data found for Wt       PHYSICAL EXAM     GEN: WD Obese, RA-not in resp distress. HEENT: Unicteric. CHEST: Non laboured breathing  ABD: Obese/soft. Non tender. DAYAMI: Deferred  Skin: See wound care nurses note. CNS: A, OX3. Moves all extremity. CN grossly ok.     Assessment:      Compression treatment      Wound Leg lower Left 01/31/20 (Active)   Number of days: 883       Wound Leg lower Right (Active)   Number of days: 888       Wound Leg lower Left 03/23/21 (Active)   Number of days: 471       Wound Leg lower Right 03/23/21 (Active)   Number of days: 471       Wound Leg Left;Lateral 06/30/22 (Active)   Wound Image    07/07/22 1046   Wound Etiology Venous 07/07/22 1046   Dressing Status Intact 07/07/22 1046   Cleansed Wound cleanser 07/07/22 1046   Dressing/Treatment Alginate with Ag 07/07/22 1046   Dressing Change Due 07/13/22 07/07/22 1046   Wound Length (cm) 0.5 cm 07/07/22 1046   Wound Width (cm) 0.5 cm 07/07/22 1046   Wound Depth (cm) 0.1 cm 07/07/22 1046   Wound Surface Area (cm^2) 0.25 cm^2 07/07/22 1046   Change in Wound Size % 97.92 07/07/22 1046   Wound Volume (cm^3) 0.025 cm^3 07/07/22 1046   Wound Healing % 98 07/07/22 1046 Wound Assessment Epithelialization;Pink/red 07/07/22 1046   Drainage Amount Scant 07/07/22 1046   Drainage Description Serous 07/07/22 1046   Wound Odor None 07/07/22 1046   Urszula-Wound/Incision Assessment Dry/flaky;Edematous 07/07/22 1046   Edges Attached edges 07/07/22 1046   Wound Thickness Description Partial thickness 07/07/22 1046   Number of days: 7       Wound Leg Right;Medial 06/30/22 (Active)   Wound Image   07/07/22 1046   Wound Etiology Venous 07/07/22 1046   Dressing Status Intact 07/07/22 1046   Cleansed Wound cleanser 07/07/22 1046   Dressing/Treatment Alginate with Ag 07/07/22 1046   Dressing Change Due 07/13/22 07/07/22 1046   Wound Length (cm) 0.5 cm 07/07/22 1046   Wound Width (cm) 0.5 cm 07/07/22 1046   Wound Depth (cm) 0.1 cm 07/07/22 1046   Wound Surface Area (cm^2) 0.25 cm^2 07/07/22 1046   Change in Wound Size % 95 07/07/22 1046   Wound Volume (cm^3) 0.025 cm^3 07/07/22 1046   Wound Healing % 95 07/07/22 1046   Wound Assessment Epithelialization;Pink/red 07/07/22 1046   Drainage Amount Scant 07/07/22 1046   Drainage Description Serous 07/07/22 1046   Wound Odor None 07/07/22 1046   Urszula-Wound/Incision Assessment Dry/flaky;Edematous 07/07/22 1046   Edges Attached edges 07/07/22 1046   Wound Thickness Description Partial thickness 07/07/22 1046   Number of days: 7       Wound Leg Right;Lateral 06/30/22 (Active)   Wound Image   07/07/22 1046   Wound Etiology Venous 07/07/22 1046   Dressing Status Intact 07/07/22 1046   Cleansed Wound cleanser 07/07/22 1046   Dressing/Treatment Alginate with Ag;Collagen 07/07/22 1046   Dressing Change Due 07/13/22 07/07/22 1046   Wound Length (cm) 4 cm 07/07/22 1046   Wound Width (cm) 5 cm 07/07/22 1046   Wound Depth (cm) 0.1 cm 07/07/22 1046   Wound Surface Area (cm^2) 20 cm^2 07/07/22 1046   Change in Wound Size % 42.86 07/07/22 1046   Wound Volume (cm^3) 2 cm^3 07/07/22 1046   Wound Healing % 43 07/07/22 1046   Wound Assessment Epithelialization;Pink/red 07/07/22 1046   Drainage Amount Small 07/07/22 1046   Drainage Description Serous 07/07/22 1046   Wound Odor None 07/07/22 1046   Urszula-Wound/Incision Assessment Dry/flaky;Edematous 07/07/22 1046   Edges Attached edges 07/07/22 1046   Wound Thickness Description Partial thickness 07/07/22 1046   Number of days: 7          Plan:     Treatment Note please see attached Discharge Instructions    Written patient dismissal instructions given to patient or POA.          Electronically signed by Ruby Barahona MD on 7/7/2022 at 4:27 PM

## 2022-07-13 ENCOUNTER — HOSPITAL ENCOUNTER (OUTPATIENT)
Dept: WOUND CARE | Age: 62
Discharge: HOME | End: 2022-07-13
Attending: INTERNAL MEDICINE
Payer: COMMERCIAL

## 2022-07-13 VITALS
OXYGEN SATURATION: 96 % | TEMPERATURE: 98 F | RESPIRATION RATE: 18 BRPM | SYSTOLIC BLOOD PRESSURE: 118 MMHG | DIASTOLIC BLOOD PRESSURE: 68 MMHG | HEART RATE: 62 BPM

## 2022-07-13 DIAGNOSIS — I83.029 VENOUS ULCER OF LEFT LEG (HCC): Primary | ICD-10-CM

## 2022-07-13 DIAGNOSIS — L97.929 VENOUS ULCER OF LEFT LEG (HCC): Primary | ICD-10-CM

## 2022-07-13 DIAGNOSIS — L97.919 VENOUS ULCER OF RIGHT LEG (HCC): ICD-10-CM

## 2022-07-13 DIAGNOSIS — I83.019 VENOUS ULCER OF RIGHT LEG (HCC): ICD-10-CM

## 2022-07-13 DIAGNOSIS — I87.2 CHRONIC VENOUS INSUFFICIENCY: ICD-10-CM

## 2022-07-13 DIAGNOSIS — E11.9 TYPE 2 DIABETES MELLITUS WITHOUT COMPLICATION, WITHOUT LONG-TERM CURRENT USE OF INSULIN (HCC): ICD-10-CM

## 2022-07-13 PROCEDURE — 29581 APPL MULTLAYER CMPRN SYS LEG: CPT

## 2022-07-13 NOTE — WOUND CARE
07/13/22 1119   Wound Leg Left;Lateral 06/30/22   Date First Assessed/Time First Assessed: 06/30/22 1055   Primary Wound Type: Venous Ulcer  Location: Leg  Wound Location Orientation: Left;Lateral  Date of First Observation: 06/30/22   Wound Image     Wound Etiology Venous   Dressing Status Intact   Cleansed Wound cleanser   Dressing/Treatment Alginate with Ag  (2 layer wrap)   Dressing Change Due 07/21/22   Wound Length (cm) 0.3 cm   Wound Width (cm) 0.3 cm   Wound Depth (cm) 0.1 cm   Wound Surface Area (cm^2) 0.09 cm^2   Change in Wound Size % 99.25   Wound Volume (cm^3) 0.009 cm^3   Wound Healing % 99   Wound Assessment Epithelialization;Pink/red   Drainage Amount Scant   Drainage Description Serous   Wound Odor None   Urszula-Wound/Incision Assessment Dry/flaky   Edges Attached edges   Wound Thickness Description Partial thickness   Wound Leg Right;Medial 06/30/22   Date First Assessed/Time First Assessed: 06/30/22 1100   Primary Wound Type: Venous Ulcer  Location: Leg  Wound Location Orientation: Right;Medial  Date of First Observation: 06/30/22   Wound Image    Wound Etiology Venous   Dressing Status Intact   Cleansed Wound cleanser   Dressing/Treatment   (2 layer wrap)   Dressing Change Due 07/21/22   Wound Length (cm)   (clinically closed)   Wound Assessment Epithelialization   Drainage Amount None   Wound Odor None   Urszula-Wound/Incision Assessment Dry/flaky   Edges Attached edges   Wound Leg Right;Lateral 06/30/22   Date First Assessed/Time First Assessed: 06/30/22 1100   Primary Wound Type: Venous Ulcer  Location: Leg  Wound Location Orientation: Right;Lateral  Date of First Observation: 06/30/22   Wound Image     Wound Etiology Venous   Dressing Status Intact   Cleansed Wound cleanser   Dressing/Treatment Alginate with Ag  (2 layer wrap)   Dressing Change Due 07/21/22   Wound Length (cm) 0.5 cm   Wound Width (cm) 0.5 cm   Wound Depth (cm) 0.1 cm   Wound Surface Area (cm^2) 0.25 cm^2   Change in Wound Size % 99.29   Wound Volume (cm^3) 0.025 cm^3   Wound Healing % 99   Wound Assessment Epithelialization;Pink/red   Drainage Amount Scant   Drainage Description Serous   Wound Odor None   Urszula-Wound/Incision Assessment Dry/flaky   Edges Attached edges   Wound Thickness Description Partial thickness     Multilayer Compression Wrap   (Not Unna) Below the Knee    NAME:  Yadi Giles  YOB: 1960  MEDICAL RECORD NUMBER:  [id number]  DATE:  7/13/2022    Removed old Multilayer wrap if indicated and wash leg with mild soap/water. Applied moisturizing agent to dry skin as needed. Applied primary and secondary dressing as ordered. Applied multilayered dressing below the knee to right lower leg. Applied multilayered dressing below the knee to left lower leg. Instructed patient/caregiver not to remove dressing and to keep it clean and dry. Instructed patient/caregiver on complications to report to provider, such as pain, numbness in toes, heavy drainage, and slippage of dressing. Instructed patient on purpose of compression dressing and on activity and exercise recommendations.     Response to treatment: Well tolerated by patient       Electronically signed by Sabina Ramirez RN on 7/13/2022 at 11:26 AM

## 2022-07-13 NOTE — DISCHARGE INSTRUCTIONS
Discharge Instructions from  [facility]  [address] [place], [facility]  [phone] Fax [phone]    NAME:  Bebo Dowling OF BIRTH:  1960  MEDICAL RECORD NUMBER:  [id number]  DATE:  7/13/2022    Wound Cleansing:   Do not scrub or use excessive force. Cleanse wound prior to applying a clean dressing with:  [] Normal Saline [] Keep Wound Dry in Shower    [] Wound Cleanser   [] Cleanse wound with Mild Soap & Water  [] May Shower at Discharge   [x] Other:  Leave dressing intact          Dressings:           Wound Location - Bilateral Legs  [] Apply Primary Dressing:       [] MediHoney Gel [] Alginate with Silver [] Alginate   [] Collagen [] Collagen with Silver   [] Santyl with Moisten saline gauze     [] Hydrocolloid   [] MediHoney Alginate [] Foam with Silver   [] Foam   [] Hydrofera Blue    [] Mepilex Border    [] Moisten with Saline [] Hydrogel [] Mepitel     [] Bactroban/Mupirocin [] Polysporin  [] Other:    [] Pack wound loosely with  [] Iodoform   [] Plain Packing  [] Other   [] Cover and Secure with:     [] Gauze [] Chikis [] Kerlix   [] Ace Wrap [] Cover Roll Tape [] ABD     [] Other:    Avoid contact of tape with skin. [] Change dressing: [] Daily    [] Every Other Day [] Three times per week   [] Once a week [x] Do Not Change Dressing   [] Other:              Edema Control:  Apply: [] Compression Stocking []Right Leg []Left Leg   [] Tubigrip []Right Leg Double Layer []Left Leg Double Layer       []Right Leg Single Layer []Left Leg Single Layer   [] SpandaGrip []Right Leg  []Left Leg      []Low compression 5-10 mm/Hg      []Medium compression 10-20 mm/Hg     []High compression  20-30 mm/Hg   every morning immediately when getting up should be applied to affected leg(s) from mid foot to knee making sure to cover the heel. Remove every night before going to bed. [x] Elevate leg(s) above the level of the heart when sitting.     [x] Avoid prolonged standing in one place. Compression:  Apply: [x] Multilayer Compression Wrap Applied in Clinic [x]RightLeg [x]Left Leg   [x] Multi-layer compression. Do not get leg(s) with wrap wet. If wraps become too tight call the center or completely remove the wrap. [x] Elevate leg(s) above the level of the heart when sitting. [x] Avoid prolonged standing in one place. Dietary:  [x] Diet as tolerated: [] Calorie Diabetic Diet: [] No Added Salt:  [] Increase Protein: [] Other:     Activity:  [x] Activity as tolerated:  [] Patient has no activity restrictions     [] Strict Bedrest: [] Remain off Work:     [] May return to full duty work:                                   [] Return to work with restrictions:             Return Appointment:  [] Wound and dressing supply provider:   [] ECF or Home Healthcare:  [] Wound Assessment: [] Physician or NP scheduled for Wound Assessment:   [x] Return Appointment: With Dr. Marian Chopra in  [address] [place] [address])  [] Ordered tests:      Electronically signed Irais Jones RN on 7/13/2022 at 11:38 AM     Joie Ashraf [address]: Should you experience any significant changes in your wound(s) or have questions about your wound care, please contact the [facility] at [address] [place] [address] 8:00 am - 4:30. If you need help with your wound outside these hours and cannot wait until we are again available, contact your PCP or go to the hospital emergency room. PLEASE NOTE: IF YOU ARE UNABLE TO OBTAIN WOUND SUPPLIES, CONTINUE TO USE THE SUPPLIES YOU HAVE AVAILABLE UNTIL YOU ARE ABLE TO REACH US. IT IS MOST IMPORTANT TO KEEP THE WOUND COVERED AT ALL TIMES.      Physician Signature:_______________________    Date: ___________ Time:  ____________

## 2022-07-21 ENCOUNTER — HOSPITAL ENCOUNTER (OUTPATIENT)
Dept: WOUND CARE | Age: 62
Discharge: HOME | End: 2022-07-21
Attending: INTERNAL MEDICINE
Payer: COMMERCIAL

## 2022-07-21 VITALS
OXYGEN SATURATION: 96 % | DIASTOLIC BLOOD PRESSURE: 56 MMHG | SYSTOLIC BLOOD PRESSURE: 113 MMHG | HEART RATE: 57 BPM | TEMPERATURE: 97.9 F | RESPIRATION RATE: 16 BRPM

## 2022-07-21 DIAGNOSIS — I83.019 VENOUS ULCER OF RIGHT LEG (HCC): ICD-10-CM

## 2022-07-21 DIAGNOSIS — L97.929 VENOUS ULCER OF LEFT LEG (HCC): Primary | ICD-10-CM

## 2022-07-21 DIAGNOSIS — L97.919 VENOUS ULCER OF RIGHT LEG (HCC): ICD-10-CM

## 2022-07-21 DIAGNOSIS — E11.9 TYPE 2 DIABETES MELLITUS WITHOUT COMPLICATION, WITHOUT LONG-TERM CURRENT USE OF INSULIN (HCC): ICD-10-CM

## 2022-07-21 DIAGNOSIS — I87.2 CHRONIC VENOUS INSUFFICIENCY: ICD-10-CM

## 2022-07-21 DIAGNOSIS — I83.029 VENOUS ULCER OF LEFT LEG (HCC): Primary | ICD-10-CM

## 2022-07-21 PROCEDURE — 99213 OFFICE O/P EST LOW 20 MIN: CPT

## 2022-07-21 NOTE — WOUND CARE
07/21/22 1115   Wound Leg Left;Lateral 06/30/22   Date First Assessed/Time First Assessed: 06/30/22 1055   Primary Wound Type: Venous Ulcer  Location: Leg  Wound Location Orientation: Left;Lateral  Date of First Observation: 06/30/22   Wound Image     Wound Etiology Venous   Dressing Status Intact   Cleansed Cleansed with saline; Other (Comment)   Dressing/Treatment Tubular bandage  (Size E tubigrip)   Dressing Change Due   (pt discharged)   Wound Length (cm)   (wounds clinically closed)   Wound Assessment Epithelialization   Wound Odor None   Urszula-Wound/Incision Assessment Dry/flaky   Edges Attached edges   Wound Leg Right;Lateral 06/30/22   Date First Assessed/Time First Assessed: 06/30/22 1100   Primary Wound Type: Venous Ulcer  Location: Leg  Wound Location Orientation: Right;Lateral  Date of First Observation: 06/30/22   Wound Image     Wound Etiology Venous   Dressing Status Intact   Cleansed Cleansed with saline   Dressing/Treatment Tubular bandage  (Size E)   Dressing Change Due   (Pt discharged)   Wound Length (cm)   (wounds clinically closed)   Wound Assessment Epithelialization   Drainage Amount None   Wound Odor None   Urszula-Wound/Incision Assessment Dry/flaky   Edges Attached edges

## 2022-07-25 NOTE — WOUND CARE
[address]   Progress Note and Procedure Note   NO Procedure      Austin Solis RECORD NUMBER:  [id number]  AGE: 63 y.o. RACE BLACK/  GENDER: male  : 1960  EPISODE DATE:  2022    Subjective:     C/C: bilateral leg wound from venous insufficency     HISTORY of PRESENT ILLNESS HPI    Vickey Altamirano is a 63 y.o. male who presents today for wound/ulcer evaluation. On initial exam day:  History of Wound Context: due to edema  Wound/Ulcer Pain Timing/Severity: mild  Quality of pain: burning  Severity:  2 / 10   Modifying Factors: None  Associated Signs/Symptoms: edema    Ulcer Identification:  Ulcer Type: venous and diabetic  Contributing Factors: edema and diabetes    Wound: recurrent leg wounds due to edema, suspected venous. No signs of inflammation/infection. Today: feels well. Doing better with improved leg swelling. Small oozing ulcers on LE healed  Lymphedema pump has arrived to his home, and will be starting his home therapy    Denies fever, chills, rigors, shortness of breath or cough. No nausea, vomiting, abdominal pain, diarrhea, rash. PAST MEDICAL HISTORY    Past Medical History:   Diagnosis Date    Chronic venous insufficiency     Diabetes ([facility] [address]     Hypertension     Morbid obesity ([facility] [address]         PAST SURGICAL HISTORY    No past surgical history on file. FAMILY HISTORY    No family history on file. SOCIAL HISTORY    Social History     Tobacco Use    Smoking status: Never    Smokeless tobacco: Never   Substance Use Topics    Alcohol use: Not Currently    Drug use: Not Currently       ALLERGIES    No Known Allergies    MEDICATIONS    Current Outpatient Medications on File Prior to Encounter   Medication Sig Dispense Refill    atorvastatin (LIPITOR) 20 mg tablet Take 20 mg by mouth daily. lisinopril-hydroCHLOROthiazide (PRINZIDE, ZESTORETIC) 10-12.5 mg per tablet Take 1 Tablet by mouth daily.       carvediloL (COREG) 25 mg tablet Take 25 mg by mouth two (2) times daily (with meals). amLODIPine (NORVASC) 10 mg tablet Take 10 mg by mouth daily. indomethacin (INDOCIN) 25 mg capsule Take 25 mg by mouth two (2) times a day. insulin glargine (Lantus U-100 Insulin) 100 unit/mL injection 10 Units by SubCUTAneous route nightly. furosemide (Lasix) 20 mg tablet Take 20 mg by mouth daily. No current facility-administered medications on file prior to encounter. REVIEW OF SYSTEMS    No F/C/R  No N/V  No cough/sob/chest pain  No abd pain/cramps or diarrhea  No joint swelling/pain  No itching or rash    Objective:     Visit Vitals  BP (!) 113/56 (BP 1 Location: Left upper arm, BP Patient Position: Sitting)   Pulse (!) 57   Temp 97.9 °F (36.6 °C)   Resp 16   SpO2 96%       Wt Readings from Last 3 Encounters:   No data found for Wt       PHYSICAL EXAM     GEN: WD Obese, RA-not in resp distress. HEENT: Unicteric. CHEST: Non laboured breathing  ABD: Obese/soft. Non tender. DAYAMI: Deferred  Skin: See wound care nurses note. CNS: A, OX3. Moves all extremity. CN grossly ok. Assessment:      Compression treatment--lymphedema pump treatment at home  FU with wound clinic in future as needed      Problem List Items Addressed This Visit          Circulatory    Chronic venous insufficiency       Endocrine    Diabetes ([address]       Integumentary    Venous ulcer of left leg ([address] - Primary    Venous ulcer of right leg ([address]     Wound Leg lower Left 01/31/20 (Active)   Number of days: 901       Wound Leg lower Right (Active)   Number of days: 906       Wound Leg lower Left 03/23/21 (Active)   Number of days: 489       Wound Leg lower Right 03/23/21 (Active)   Number of days: 489       Wound Leg Left;Lateral 06/30/22 (Active)   Wound Image    07/21/22 1115   Wound Etiology Venous 07/21/22 1115   Dressing Status Intact 07/21/22 1115   Cleansed Cleansed with saline; Other (Comment) 07/21/22 1115   Dressing/Treatment Tubular bandage 07/21/22 1115   Dressing Change Due 07/21/22 07/13/22 1119   Wound Length (cm) 0.3 cm 07/13/22 1119   Wound Width (cm) 0.3 cm 07/13/22 1119   Wound Depth (cm) 0.1 cm 07/13/22 1119   Wound Surface Area (cm^2) 0.09 cm^2 07/13/22 1119   Change in Wound Size % 99.25 07/13/22 1119   Wound Volume (cm^3) 0.009 cm^3 07/13/22 1119   Wound Healing % 99 07/13/22 1119   Wound Assessment Epithelialization 07/21/22 1115   Drainage Amount Scant 07/13/22 1119   Drainage Description Serous 07/13/22 1119   Wound Odor None 07/21/22 1115   Urszula-Wound/Incision Assessment Dry/flaky 07/21/22 1115   Edges Attached edges 07/21/22 1115   Wound Thickness Description Partial thickness 07/13/22 1119   Number of days: 25       Wound Leg Right;Medial 06/30/22 (Active)   Wound Image   07/13/22 1119   Wound Etiology Venous 07/13/22 1119   Dressing Status Intact 07/13/22 1119   Cleansed Wound cleanser 07/13/22 1119   Dressing/Treatment Alginate with Ag 07/07/22 1046   Dressing Change Due 07/21/22 07/13/22 1119   Wound Length (cm) 0.5 cm 07/07/22 1046   Wound Width (cm) 0.5 cm 07/07/22 1046   Wound Depth (cm) 0.1 cm 07/07/22 1046   Wound Surface Area (cm^2) 0.25 cm^2 07/07/22 1046   Change in Wound Size % 95 07/07/22 1046   Wound Volume (cm^3) 0.025 cm^3 07/07/22 1046   Wound Healing % 95 07/07/22 1046   Wound Assessment Epithelialization 07/21/22 1115   Drainage Amount None 07/21/22 1115   Drainage Description Serous 07/07/22 1046   Wound Odor None 07/21/22 1115   Urszula-Wound/Incision Assessment Dry/flaky 07/21/22 1115   Edges Attached edges 07/21/22 1115   Wound Thickness Description Partial thickness 07/07/22 1046   Number of days: 25       Wound Leg Right;Lateral 06/30/22 (Active)   Wound Image    07/21/22 1115   Wound Etiology Venous 07/21/22 1115   Dressing Status Intact 07/21/22 1115   Cleansed Cleansed with saline 07/21/22 1115   Dressing/Treatment Tubular bandage 07/21/22 1115   Dressing Change Due 07/21/22 07/13/22 1119   Wound Length (cm) 0.5 cm 07/13/22 1119   Wound Width (cm) 0.5 cm 07/13/22 1119   Wound Depth (cm) 0.1 cm 07/13/22 1119   Wound Surface Area (cm^2) 0.25 cm^2 07/13/22 1119   Change in Wound Size % 99.29 07/13/22 1119   Wound Volume (cm^3) 0.025 cm^3 07/13/22 1119   Wound Healing % 99 07/13/22 1119   Wound Assessment Epithelialization 07/21/22 1115   Drainage Amount None 07/21/22 1115   Drainage Description Serous 07/13/22 1119   Wound Odor None 07/21/22 1115   Urszula-Wound/Incision Assessment Dry/flaky 07/21/22 1115   Edges Attached edges 07/21/22 1115   Wound Thickness Description Partial thickness 07/13/22 1119   Number of days: 25               Plan:     Treatment Note please see attached Discharge Instructions    Written patient dismissal instructions given to patient or POA.          Electronically signed by Palmira Rodriguez MD on 7/21/2022 at 4:27 PM

## 2023-03-02 ENCOUNTER — HOSPITAL ENCOUNTER (OUTPATIENT)
Facility: HOSPITAL | Age: 63
Discharge: HOME | End: 2023-03-02
Payer: COMMERCIAL

## 2023-03-02 VITALS
TEMPERATURE: 98.9 F | RESPIRATION RATE: 18 BRPM | DIASTOLIC BLOOD PRESSURE: 65 MMHG | HEART RATE: 80 BPM | SYSTOLIC BLOOD PRESSURE: 108 MMHG

## 2023-03-02 PROCEDURE — 29581 APPL MULTLAYER CMPRN SYS LEG: CPT

## 2023-03-02 RX ORDER — MELOXICAM 7.5 MG/1
7.5 TABLET ORAL DAILY
COMMUNITY
Start: 2023-01-19

## 2023-03-02 RX ORDER — DULAGLUTIDE 1.5 MG/.5ML
0.5 INJECTION, SOLUTION SUBCUTANEOUS WEEKLY
COMMUNITY
Start: 2023-01-19

## 2023-03-02 NOTE — WOUND CARE
[address] [facility]   History and Physical Note   Referring Provider: ***  Reason for Referral: ***    Norval Apley  MEDICAL RECORD NUMBER:  [id number]  AGE: 63 y.o. GENDER: male  : 1960  EPISODE DATE:  3/2/2023    Chief complaint and reason for visit:     Chief Complaint   Patient presents with    Wound Check         HISTORY of PRESENT ILLNESS HPI     Norval Apley is a 63 y.o. male who presents today for an initial evaluation of a wound/ulcer. Patient is {New returnin}. Wound duration: {Wound present since:80950}. History of Wound Context: ***  Pertinent associated symptoms: {Wound associated symptoms:36237}    PAST MEDICAL HISTORY        Diagnosis Date    Chronic venous insufficiency     Diabetes ([facility] [address]     Hypertension     Morbid obesity ([facility] [address]        PAST SURGICAL HISTORY  History reviewed. No pertinent surgical history. FAMILY HISTORY  History reviewed. No pertinent family history.     SOCIAL HISTORY  Social History     Tobacco Use    Smoking status: Never    Smokeless tobacco: Never   Substance Use Topics    Alcohol use: Not Currently    Drug use: Not Currently       ALLERGIES  No Known Allergies    MEDICATIONS  Current Outpatient Medications on File Prior to Encounter   Medication Sig Dispense Refill    dulaglutide (TRULICITY) 1.5 RK/5.2FF SC injection Inject 0.5 mLs into the skin once a week      meloxicam (MOBIC) 7.5 MG tablet Take 7.5 mg by mouth daily      amLODIPine (NORVASC) 10 MG tablet Take 10 mg by mouth daily      atorvastatin (LIPITOR) 20 MG tablet Take 20 mg by mouth daily      carvedilol (COREG) 25 MG tablet Take 25 mg by mouth 2 times daily (with meals)      furosemide (LASIX) 20 MG tablet Take 20 mg by mouth daily      indomethacin (INDOCIN) 25 MG capsule Take 25 mg by mouth 2 times daily (Patient not taking: Reported on 3/2/2023)      insulin glargine (LANTUS) 100 UNIT/ML injection vial Inject 10 Units into the skin lisinopril-hydroCHLOROthiazide (PRINZIDE;ZESTORETIC) 10-12.5 MG per tablet Take 1 tablet by mouth daily (Patient not taking: Reported on 3/2/2023)       No current facility-administered medications on file prior to encounter. REVIEW OF SYSTEMS  A comprehensive review of systems was negative except for: {Ros - complete:87725}    Objective:      /65   Pulse 80   Temp 98.9 °F (37.2 °C) (Oral)   Resp 18     Wt Readings from Last 3 Encounters:   No data found for Wt       PHYSICAL EXAM  General Appearance/Constitutional: alert and oriented to person, place and time,  and in no acute distress. Nontoxic. Skin: warm and dry, no rash, positive wound per LDA documentation if applicable. Head: normocephalic and atraumatic. Eyes: extraocular eye movements intact, conjunctivae normal, and sclera anicteric. ENT: hearing grossly normal bilaterally. Normal appearance. Pulmonary/Chest: no chest wall tenderness and clear anteriorly. No respiratory distress. Cardiovascular: normal rate and regular rhythm. GI: abdomen soft, non-tender and non-distended. Musculoskeletal: normal range of motion of joints. Nontender calves. No cyanosis. Nontender calves. Edema {Numbers; 1-4+ (neg and trace):51728}  Neurologic: no gross cranial nerve deficit and speech normal. No focal deficits. Mental status normal.    Medical Decision Making:     ***  Assessment required other independent historian(s): {YES/NO:19726}. Additional Historian: {Historians:25722}. Comorbid conditions affecting wound healing: As noted in [address] and [facility] which was reviewed. Problem List Items Addressed This Visit    None      Wounds and Treatment Plan:      Other diagnoses or problems addressed:      Pertinent labs reviewed. Review of medical records and external note (s) from other providers was done for this visit.     New lab or imaging orders placed:  ***    Prescription drug management: { N/A :06405}     Risk of complications and/or mortality of patient management: This patient has a {Risk:89376} risk of morbidity and mortality from additional diagnostic testing or treatment. This is due to the above conditions affecting wound healing as well as patient and procedure risk factors. Education and discussion held with patient regarding these disease processes pertinent to wound(s). Other pertinent decisions include: {Other pertinent decisions:54831}. The patient's diagnosis or treatment {IS/IS NOT:19932} significantly limited by social determinants of health as noted by: {Limited social determinants:62830}. Discussion of management or test interpretation with {Discussion management:02617}. Time spent with patient and patient care issues above the usual time needed for wound assessment and treatment was: [] 15-20 min  [] 21-30 min  [] 31-44 min  [] 45 min or more  This included time retrieving and reviewing records with patient and education provided to patient regarding disease process(es), offloading or pressure relief, nutrition needed for wound healing, smoking cessation when applicable, and infection risk.     This time also included physician non-face-to-face service time visit on the date of service such as  Preparing to see the patient (eg, review of tests)  Obtaining and/or reviewing separately obtained history  Performing a medically necessary appropriate examination and/or evaluation  Counseling and educating the patient/family/caregiver  Ordering medications, tests, or procedures  Referring and communicating with other health care professionals as needed  Documenting clinical information in the electronic or other health record  Independently interpreting results (not reported separately) and communicating results to the patient/family/caregiver  Care coordination (not reported separately)            Procedures done this visit:   Procedure Note  Indications: Based on my examination of this patient's wound(s)/ulcer(s) today, debridement is required to promote healing and evaluate the wound base.     Debridement: Excisional Debridement     Using: curette the wound(s)/ulcer(s) was/were debrided down through and including the removal of epidermis, dermis, and subcutaneous tissue.  Performed by: Funmi Salas MD  Consent obtained: Yes  Time out taken: Yes  Pain Control:       Lidocaine 2%  Devitalized Tissue Debrided: fibrin, biofilm, and slough     Pre Debridement Measurements:  Are located in the Wound/Ulcer Documentation Flow Sheet     Diabetic/Pressure/Non Pressure Ulcers only:  Ulcer: Pressure ulcer, unstageable\"      Wound/Ulcer #: 1  Post Debridement Measurements:  Wound/Ulcer Descriptions are Pre Debridement except measurements:  Total Surface Area Debrided:   Estimated Blood Loss: Minimal amount blood loss .  Hemostasis Achieved:  by pressure  Procedural Pain: 0  / 10   Post Procedural Pain: 0 / 10   Response to treatment: Patient tolerated procedure well with no complaints of pain.      Debridement: {Debridement :95689}  Using {DEBRIDEMENT INSTRUMENTS:83659} the wound(s)/ulcer(s) was/were debrided down through and including the removal of {WOUND CARE DEBRIDEMENT:006249027}.      Devitalized Tissue Debrided:  {DEVITALIZED TISSUE DEBRIDED:694195120}  Pre Debridement Measurements:  Are located in the Wound/Ulcer Documentation Flow Sheet  Diabetic/Pressure/Non Pressure Ulcers only:  Ulcer: {Diabetic/Pressure/Non Pressure Ulcers only:42782}   Wound/Ulcer #: { WOUNDS NUMBERS:[id number]}  Post Debridement Measurements:  Wound/Ulcer Descriptions are Pre Debridement except measurements:  Total Surface Area Debrided:  *** sq cm   Estimated Blood Loss:  {ESTIMATED BLOOD LOSS:764142140}  Hemostasis Achieved:  {CONTROLLED WITH:107765868}  Procedural Pain:  {NUMBERS 0-10:00680}  / 10   Post Procedural Pain:  {NUMBERS 0-10:07584} / 10   Response to treatment:  {HH TOLERATED:016587}      Written patient dismissal instructions given to patient and signed by  patient or POA. Patient voiced understanding that the importance of adherence to instructions is paramount to wound healing improvement or success.      Electronically signed by Gertrudis Angulo MD on 3/2/2023 at 12:18 PM

## 2023-03-02 NOTE — DISCHARGE INSTRUCTIONS
Discharge Instructions from  [address] [facility]  [address] [facility] [address], [facility]  [phone] Fax [phone]    Do not remove dressing     Return Appointment:  [] Wound and dressing supply provider:   [] ECF or Home Healthcare:  [] Wound Assessment: [] Physician or NP scheduled for Wound Assessment:   [x] Return Appointment: With MD  in  1 Week(s)  [] Ordered tests:       [facility] Information: Should you experience any significant changes in your wound(s) or have questions about your wound care, please contact the [facility] at [address] [place] [address] 8:00 am - 4:30. If you need help with your wound outside these hours and cannot wait until we are again available, contact your PCP or go to the hospital emergency room. PLEASE NOTE: IF YOU ARE UNABLE TO OBTAIN WOUND SUPPLIES, CONTINUE TO USE THE SUPPLIES YOU HAVE AVAILABLE UNTIL YOU ARE ABLE TO REACH US. IT IS MOST IMPORTANT TO KEEP THE WOUND COVERED AT ALL TIMES.

## 2023-03-03 NOTE — FLOWSHEET NOTE
03/02/23 1613   Wound 03/02/23 Leg Right; Lower scattered open areas   Date First Assessed/Time First Assessed: 03/02/23 1613   Present on Hospital Admission: Yes  Wound Approximate Age at First Assessment (Weeks): 1 weeks  Primary Wound Type: Venous Ulcer  Location: Leg  Wound Location Orientation: Right; Lower  Wound De. .. Wound Image      Wound Etiology Venous   Dressing Status Old drainage noted   Wound Cleansed Wound cleanser; Soap and water   Dressing/Treatment Alginate with Ag   Offloading for Diabetic Foot Ulcers Offloading not required   Dressing Change Due 03/09/23   Wound Length (cm)   (scattered open areas)   Wound Assessment Pink/red   Drainage Amount Small   Drainage Description Serous   Odor None   Arlen-wound Assessment Dry/flaky;Edematous   Margins Attached edges   Wound Thickness Description not for Pressure Injury Partial thickness   Wound 03/02/23 Pretibial Left scattered open areas   Date First Assessed/Time First Assessed: 03/02/23 1614   Present on Hospital Admission: Yes  Wound Approximate Age at First Assessment (Weeks): 1 weeks  Primary Wound Type: Venous Ulcer  Location: Pretibial  Wound Location Orientation: Left  Wound Luis. .. Wound Image      Wound Etiology Venous   Dressing Status Old drainage noted   Wound Cleansed Wound cleanser; Soap and water   Dressing/Treatment Alginate with Ag   Offloading for Diabetic Foot Ulcers Offloading not required   Dressing Change Due 03/09/23   Wound Length (cm)   (scattered open areas)   Wound Assessment Pink/red   Drainage Amount Small   Drainage Description Serosanguinous   Odor None   Arlen-wound Assessment Dry/flaky   Margins Attached edges   Wound Thickness Description not for Pressure Injury Partial thickness   Diogenes Scale   Sensory Perceptions 4   Moisture 4   Activity 4   Mobility 4   Nutrition 4   Friction and Shear 3   Diogenes Scale Score 23   Wound Follow Up   Require Follow Up Yes     Multilayer Compression Wrap   (Not Unna) Below the Knee    NAME:  eRmi Reveles  YOB: 1960  MEDICAL RECORD NUMBER:  [id number]  DATE:  3/2/2023    Multilayer compression wrap: Removed old Multilayer wrap if indicated and wash leg with mild soap/water. Applied moisturizing agent to dry skin as needed. Applied primary and secondary dressing as ordered. Applied multilayered dressing below the knee to right lower leg. Applied multilayered dressing below the knee to left lower leg. Instructed patient/caregiver not to remove dressing and to keep it clean and dry. Instructed patient/caregiver on complications to report to provider, such as pain, numbness in toes, heavy drainage, and slippage of dressing.       Electronically signed by Ayan Mason RN on 3/3/2023 at 4:01 PM

## 2023-03-03 NOTE — WOUND CARE
Compression  [facility]:     [address] f: [phone] f: [phone] p: [phone] [email or web address]      Ordering Center:     [facility]  [facility] [address]  [address] [place] [address] CARE Dept: [address] [phone]    Patient Information:      Maite Fitzgerald  [address] [facility] ([place], [place])  [address] [place] [address]   [phone]   : 1960  AGE: 63 y.o. GENDER: male   TODAYS DATE:  3/3/2023    Insurance:      PRIMARY INSURANCE:  Plan: [facility] [employer]ET[employer] BETTER [facility]  Coverage: [facility]  [address] [place]  Effective Date: 2022  Group Number: [unfilled]  Subscriber Number: [id number] - (Medicaid Managed)    Payer/Plan Subscr  Sex Relation Sub. Ins. ID Effective Group Num   1. AETNA BETTER * ROSHNI ROGERS 1960 Male Self [id number]                                    [address] [address]       Patient Information:      Problem List Items Addressed This Visit    None      Wound 23 Leg Right; Lower scattered open areas (Active)   Wound Image     23 1613   Wound Etiology Venous 23 1613   Dressing Status Old drainage noted 23 1613   Wound Cleansed Wound cleanser; Soap and water 23 1613   Dressing/Treatment Alginate with Ag 23 1613   Offloading for Diabetic Foot Ulcers Offloading not required 23 1613   Dressing Change Due 23 1613   Wound Assessment Pink/red 23 1613   Drainage Amount Small 23 1613   Drainage Description Serous 23 1613   Odor None 23 1613   Arlen-wound Assessment Dry/flaky;Edematous 23 1613   Margins Attached edges 23 1613   Wound Thickness Description not for Pressure Injury Partial thickness 23 1613   Number of days: 0       Wound 23 Pretibial Left scattered open areas (Active)   Wound Image     23 1613   Wound Etiology Venous 23 1613 Dressing Status Old drainage noted 03/02/23 1613   Wound Cleansed Wound cleanser; Soap and water 03/02/23 1613   Dressing/Treatment Alginate with Ag 03/02/23 1613   Offloading for Diabetic Foot Ulcers Offloading not required 03/02/23 1613   Dressing Change Due 03/09/23 03/02/23 1613   Wound Assessment Pink/red 03/02/23 1613   Drainage Amount Small 03/02/23 1613   Drainage Description Serosanguinous 03/02/23 1613   Odor None 03/02/23 1613   Arlen-wound Assessment Dry/flaky 03/02/23 1613   Margins Attached edges 03/02/23 1613   Wound Thickness Description not for Pressure Injury Partial thickness 03/02/23 1613   Number of days: 0       Right Leg Measurements: (ALL measurements are in cm)   42 at calf  29 at ankle  48 length    Left Leg Measurements: (ALL measurements are in cm)   42 at calf   30 at ankle  48 length    Supplies Requested :     Medicare Requirements  Patient must have a qualifying Active Venus Ulcer if ordering Bilateral Compression Wounds MUST be present on both legs for Medicare Coverage. The patient can Not be on home health or have had a Medicare part A stay in the past 24 hours. Patient Wound(s) Debrided: [x] Yes if yes please add date 3/2/2023   [] No    Debribement Type: Mechanical     Patient currently being seen by Home Health: [] Yes   [x] No     Compression Type:  Other Sigvaris    Provider Information:      PROVIDER'S NAME: Dr. Dave Carbajal    NPI:  [id number]

## 2023-03-09 ENCOUNTER — HOSPITAL ENCOUNTER (OUTPATIENT)
Facility: HOSPITAL | Age: 63
Discharge: HOME | End: 2023-03-09
Payer: COMMERCIAL

## 2023-03-09 VITALS
HEART RATE: 70 BPM | WEIGHT: 257 LBS | SYSTOLIC BLOOD PRESSURE: 109 MMHG | TEMPERATURE: 99 F | OXYGEN SATURATION: 96 % | BODY MASS INDEX: 42.82 KG/M2 | RESPIRATION RATE: 18 BRPM | HEIGHT: 65 IN | DIASTOLIC BLOOD PRESSURE: 66 MMHG

## 2023-03-09 DIAGNOSIS — I83.029 VENOUS ULCER OF LEFT LEG (HCC): ICD-10-CM

## 2023-03-09 DIAGNOSIS — L97.919 VENOUS ULCER OF RIGHT LEG (HCC): ICD-10-CM

## 2023-03-09 DIAGNOSIS — I87.2 CHRONIC VENOUS INSUFFICIENCY: ICD-10-CM

## 2023-03-09 DIAGNOSIS — I89.0 LYMPHEDEMA: ICD-10-CM

## 2023-03-09 DIAGNOSIS — I83.019 VENOUS ULCER OF RIGHT LEG (HCC): ICD-10-CM

## 2023-03-09 DIAGNOSIS — E66.01 MORBIDLY OBESE (HCC): Primary | ICD-10-CM

## 2023-03-09 DIAGNOSIS — L97.929 VENOUS ULCER OF LEFT LEG (HCC): ICD-10-CM

## 2023-03-09 PROBLEM — Z87.39 HISTORY OF GOUT: Status: ACTIVE | Noted: 2022-07-07

## 2023-03-09 PROCEDURE — 29581 APPL MULTLAYER CMPRN SYS LEG: CPT

## 2023-03-09 RX ORDER — GINSENG 100 MG
CAPSULE ORAL ONCE
Status: CANCELLED | OUTPATIENT
Start: 2023-03-09 | End: 2023-03-09

## 2023-03-09 RX ORDER — BACITRACIN ZINC AND POLYMYXIN B SULFATE 500; 1000 [USP'U]/G; [USP'U]/G
OINTMENT TOPICAL ONCE
Status: CANCELLED | OUTPATIENT
Start: 2023-03-09 | End: 2023-03-09

## 2023-03-09 RX ORDER — CLOBETASOL PROPIONATE 0.5 MG/G
OINTMENT TOPICAL ONCE
OUTPATIENT
Start: 2023-03-09 | End: 2023-03-09

## 2023-03-09 RX ORDER — BACITRACIN, NEOMYCIN, POLYMYXIN B 400; 3.5; 5 [USP'U]/G; MG/G; [USP'U]/G
OINTMENT TOPICAL ONCE
OUTPATIENT
Start: 2023-03-09 | End: 2023-03-09

## 2023-03-09 RX ORDER — BETAMETHASONE DIPROPIONATE 0.05 %
OINTMENT (GRAM) TOPICAL ONCE
Status: CANCELLED | OUTPATIENT
Start: 2023-03-09 | End: 2023-03-09

## 2023-03-09 RX ORDER — GINSENG 100 MG
CAPSULE ORAL ONCE
OUTPATIENT
Start: 2023-03-09 | End: 2023-03-09

## 2023-03-09 RX ORDER — CLOBETASOL PROPIONATE 0.5 MG/G
OINTMENT TOPICAL ONCE
Status: CANCELLED | OUTPATIENT
Start: 2023-03-09 | End: 2023-03-09

## 2023-03-09 RX ORDER — BACITRACIN ZINC AND POLYMYXIN B SULFATE 500; 1000 [USP'U]/G; [USP'U]/G
OINTMENT TOPICAL ONCE
OUTPATIENT
Start: 2023-03-09 | End: 2023-03-09

## 2023-03-09 RX ORDER — GENTAMICIN SULFATE 1 MG/G
OINTMENT TOPICAL ONCE
OUTPATIENT
Start: 2023-03-09 | End: 2023-03-09

## 2023-03-09 RX ORDER — BETAMETHASONE DIPROPIONATE 0.05 %
OINTMENT (GRAM) TOPICAL ONCE
OUTPATIENT
Start: 2023-03-09 | End: 2023-03-09

## 2023-03-09 RX ORDER — BACITRACIN, NEOMYCIN, POLYMYXIN B 400; 3.5; 5 [USP'U]/G; MG/G; [USP'U]/G
OINTMENT TOPICAL ONCE
Status: CANCELLED | OUTPATIENT
Start: 2023-03-09 | End: 2023-03-09

## 2023-03-09 RX ORDER — GENTAMICIN SULFATE 1 MG/G
OINTMENT TOPICAL ONCE
Status: CANCELLED | OUTPATIENT
Start: 2023-03-09 | End: 2023-03-09

## 2023-03-09 NOTE — FLOWSHEET NOTE
03/09/23 1347   Wound 03/02/23 Leg Right; Lower scattered open areas   Date First Assessed/Time First Assessed: 03/02/23 1613   Present on Hospital Admission: Yes  Wound Approximate Age at First Assessment (Weeks): 1 weeks  Primary Wound Type: Venous Ulcer  Location: Leg  Wound Location Orientation: Right; Lower  Wound De. .. Wound Image      Wound Etiology Venous   Dressing Status Intact   Wound Cleansed Wound cleanser   Dressing/Treatment Alginate with Ag   Offloading for Diabetic Foot Ulcers Offloading not required   Dressing Change Due 03/16/23   Wound Length (cm)   (scattered open areas)   Wound Assessment Epithelialization;Pink/red   Drainage Amount Small   Drainage Description Serous   Odor None   Arlen-wound Assessment Dry/flaky   Margins Attached edges   Wound Thickness Description not for Pressure Injury Partial thickness   Wound 03/02/23 Pretibial Left scattered open areas   Date First Assessed/Time First Assessed: 03/02/23 1614   Present on Hospital Admission: Yes  Wound Approximate Age at First Assessment (Weeks): 1 weeks  Primary Wound Type: Venous Ulcer  Location: Pretibial  Wound Location Orientation: Left  Wound Luis. ..    Wound Image       Wound Etiology Venous   Dressing Status Old drainage noted   Wound Cleansed Wound cleanser   Dressing/Treatment Alginate with Ag   Offloading for Diabetic Foot Ulcers Offloading not required   Dressing Change Due 03/16/23   Wound Length (cm)   (scattered open areas)   Wound Assessment Pink/red;Devitalized tissue   Drainage Amount Small   Drainage Description Serosanguinous   Odor None   Arlen-wound Assessment Dry/flaky   Margins Attached edges   Wound Thickness Description not for Pressure Injury Partial thickness   Diogenes Scale   Sensory Perceptions 4   Moisture 4   Activity 4   Mobility 4   Nutrition 4   Friction and Shear 3   Diogenes Scale Score 23   Wound Follow Up   Require Follow Up Yes     Multilayer Compression Wrap   (Not Unna) Below the Knee    NAME: Deandra Grace  YOB: 1960  MEDICAL RECORD NUMBER:  [id number]  DATE:  3/9/2023    Multilayer compression wrap: Removed old Multilayer wrap if indicated and wash leg with mild soap/water. Applied moisturizing agent to dry skin as needed. Applied primary and secondary dressing as ordered. Applied multilayered dressing below the knee to right lower leg. Applied multilayered dressing below the knee to left lower leg. Instructed patient/caregiver not to remove dressing and to keep it clean and dry. Instructed patient/caregiver on complications to report to provider, such as pain, numbness in toes, heavy drainage, and slippage of dressing. Instructed patient on purpose of compression dressing and on activity and exercise recommendations.       Electronically signed by Angela Paredes RN on 3/9/2023 at 1:55 PM

## 2023-03-09 NOTE — DISCHARGE INSTRUCTIONS
Discharge Instructions from  [facility]  [address] [place] [address], [facility]0 [facility]  [phone] Fax [phone]    NAME:  Tevin Puente OF BIRTH:  1960  MEDICAL RECORD NUMBER:  [id number]  DATE: 3/9/2023    Wound Cleansing:   Do not scrub or use excessive force. Cleanse wound prior to applying a clean dressing with:  [] Normal Saline [] Keep Wound Dry in Shower    [] Wound Cleanser   [] Cleanse wound with Mild Soap & Water  [] May Shower at Discharge   [] Other:      Topical Treatments:  Do not apply lotions, creams, or ointments to wound bed unless directed. [] Apply moisturizing lotion to skin surrounding the wound prior to dressing change.  [] Apply antifungal ointment to skin surrounding the wound prior to dressing change.  [] Apply thin film of moisture barrier ointment to skin immediately around wound. [] Other:       Dressings:           Wound Location Bilateral Legs   [] Apply Primary Dressing:       [] MediHoney Gel [] Alginate with Silver [] Alginate   [] Collagen [] Collagen with Silver   [] Santyl with Moisten saline gauze     [] Hydrocolloid   [] MediHoney Alginate [] Foam with Silver   [] Foam   [] Hydrofera Blue    [] Mepilex Border    [] Moisten with Saline [] Hydrogel [] Mepitel     [] Bactroban/Mupirocin [] Polysporin  [] Other:    [] Pack wound loosely with  [] Iodoform   [] Plain Packing  [] Other   [] Cover and Secure with:     [] Gauze [] Rebecca [] Kerlix   [] Ace Wrap [] Cover Roll Tape [] ABD     [] Other:    Avoid contact of tape with skin.   [] Change dressing: [] Daily    [] Every Other Day [] Three times per week   [] Once a week [x] Do Not Change Dressing   [] Other:            Edema Control:  Apply: [] Compression Stocking []Right Leg []Left Leg   [] Tubigrip []Right Leg Double Layer []Left Leg Double Layer       []Right Leg Single Layer []Left Leg Single Layer   [] SpandaGrip []Right Leg  []Left Leg      []Low compression 5-10 mm/Hg      []Medium compression 10-20 mm/Hg     []High compression  20-30 mm/Hg   every morning immediately when getting up should be applied to affected leg(s) from mid foot to knee making sure to cover the heel. Remove every night before going to bed. [x] Elevate leg(s) above the level of the heart when sitting. [x] Avoid prolonged standing in one place. Compression:  Apply: [x] Multilayer Compression Wrap Applied in Clinic []RightLeg []Left Leg   [] Multi-layer compression. Do not get leg(s) with wrap wet. If wraps become too tight call the center or completely remove the wrap. [x] Elevate leg(s) above the level of the heart when sitting. [x] Avoid prolonged standing in one place. Off-Loading:   [] Off-loading when [] walking  [] in bed [] sitting  [] Total non-weight bearing  [] Right Leg  [] Left Leg   [] Assistive Device [] Walker [] Cane  [] Wheelchair  [] Crutches   [] Surgical shoe    [] Podus Boot(s)   [] Foam Boot(s)  [] Roll About    [] Cast Boot [] CROW Boot  [] Other:         Dietary:  [x] Diet as tolerated: [] Calorie Diabetic Diet: [] No Added Salt:  [] Increase Protein: [] Other:       Activity:  [x] Activity as tolerated:  [] Patient has no activity restrictions     [] Strict Bedrest: [] Remain off Work:     [] May return to full duty work:                                   [] Return to work with restrictions:             Return Appointment:  [] Wound and dressing supply provider:   [] ECF or Home Healthcare:  [] Wound Assessment: [] Physician or NP scheduled for Wound Assessment:   [x] Return Appointment: With Dr. Stas Romero  in  [facility])  [] Ordered tests:      Electronically signed Noni Bowen RN on 3/9/2023 at [address]: Should you experience any significant changes in your wound(s) or have questions about your wound care, please contact the [address] at [address] [place] [address] 8:00 am - 4:30.   If you need help with your wound outside these hours and cannot wait until we are again available, contact your PCP or go to the hospital emergency room. PLEASE NOTE: IF YOU ARE UNABLE TO OBTAIN WOUND SUPPLIES, CONTINUE TO USE THE SUPPLIES YOU HAVE AVAILABLE UNTIL YOU ARE ABLE TO REACH US. IT IS MOST IMPORTANT TO KEEP THE WOUND COVERED AT ALL TIMES.      Physician Signature:_______________________    Date: ___________ Time:  ____________

## 2023-03-09 NOTE — WOUND CARE
[facility]   Progress Note   Referring Provider: MD Nadia- PCP  Reason for Referral: recurrent LE wound    Joaquín Robison  MEDICAL RECORD NUMBER:  [id number]  AGE: 63 y.o. GENDER: male  : 1960  EPISODE DATE:  3/9/2023    Chief complaint and reason for visit:     BL leg wounds      HPI on initial evaluation 2023:      Joaquín Robison is a 63 y.o. male who presents today for an initial evaluation of a wound/ulcer. Patient is returning to the wound center for a recurrent or new wound. He was last seen here in 2022, at which time his lower extremity wound healed and he was discharged. He has lymphedema pump and uses it regularly. Wound duration:  4 weeks or more on initial visit. History of Wound Context: venous insuffiiciency and diabetes wound  Pertinent associated symptoms: swelling    Contributing factors:   Diabetes: yes-- HB A1C--N/A  Vascular: venous insufficiency and lymphedema  Smoking: No     Today's Visit 3/9/2023:  Here alone for weekly visit. Left leg posterior part with pain- 3/10-- no exacerbating factors. Denies fever or chills. Denies N/V. Wound lesions are better on this visit. Doing ok with compression wrapping at home. Past Medical History:   Diagnosis Date    Chronic venous insufficiency     Diabetes ([address]     Hypertension     Morbid obesity ([address]       PAST SURGICAL HISTORY  No past surgical history on file. FAMILY HISTORY  No family history on file.     SOCIAL HISTORY  Social History     Tobacco Use    Smoking status: Never    Smokeless tobacco: Never   Substance Use Topics    Alcohol use: Not Currently    Drug use: Not Currently       ALLERGIES  No Known Allergies    MEDICATIONS  Current Outpatient Medications on File Prior to Encounter   Medication Sig Dispense Refill    dulaglutide (TRULICITY) 1.5 SC/3.4KK SC injection Inject 0.5 mLs into the skin once a week      meloxicam (MOBIC) 7.5 MG tablet Take 7.5 mg by mouth daily amLODIPine (NORVASC) 10 MG tablet Take 10 mg by mouth daily      atorvastatin (LIPITOR) 20 MG tablet Take 20 mg by mouth daily      carvedilol (COREG) 25 MG tablet Take 25 mg by mouth 2 times daily (with meals)      furosemide (LASIX) 20 MG tablet Take 20 mg by mouth daily      indomethacin (INDOCIN) 25 MG capsule Take 25 mg by mouth 2 times daily (Patient not taking: Reported on 3/2/2023)      insulin glargine (LANTUS) 100 UNIT/ML injection vial Inject 10 Units into the skin      lisinopril-hydroCHLOROthiazide (PRINZIDE;ZESTORETIC) 10-12.5 MG per tablet Take 1 tablet by mouth daily (Patient not taking: Reported on 3/2/2023)       No current facility-administered medications on file prior to encounter. REVIEW OF SYSTEMS  A comprehensive review of systems was negative except for: A comprehensive review of systems was negative. Except as mentioned in HPI    Objective:      /66   Pulse 70   Temp 99 °F (37.2 °C) (Oral)   Resp 18   Ht 5' 5.25\" (1.657 m)   Wt 257 lb (116.6 kg)   SpO2 96%   BMI 42.44 kg/m²     Wt Readings from Last 3 Encounters:   03/09/23 257 lb (116.6 kg)       PHYSICAL EXAM  General Appearance/Constitutional: alert and oriented to person, place and time,  and in no acute distress. Nontoxic. WN and obese. Head: normocephalic and atraumatic. Eyes: extraocular eye movements intact, conjunctivae normal, and sclera anicteric. ENT: hearing grossly normal bilaterally. Normal appearance. Pulmonary/Chest: No respiratory distress. GI: abdomen soft, non-tender and non-distended. Musculoskeletal: normal range of motion of joints. Nontender calves. No cyanosis. Nontender calves. Lower extremity Edema 2+ on Right LE and 3+ on left LE  Good and palpable pulse both feet- d.pedis  Neurologic: no gross cranial nerve deficit and speech normal. No focal deficits.  Mental status normal.  Skin: warm and dry, no rash  --wound per [facility] documentation    Problem list for wound clinic: Bilateral leg ulcers with edema-- left > Right  DM--on treatment  Venous insufficiency  Lymphedema      Wounds and Treatment Plan:  Cont compression treatment  No debridement done today    Other diagnoses or problems addressed:  DM- stressed DM control    Pertinent labs reviewed. No recent labs to review  Review of medical records and external note (s) from other providers was done for this visit. New lab or imaging orders placed:  No    Written patient dismissal instructions given to patient and signed by patient or POA. Patient voiced understanding that the importance of adherence to instructions is paramount to wound healing improvement or success.      Electronically signed by Tamara Charles MD on 3/9/2023 at 1:04 PM

## 2023-03-16 ENCOUNTER — HOSPITAL ENCOUNTER (OUTPATIENT)
Facility: HOSPITAL | Age: 63
Discharge: HOME | End: 2023-03-16
Payer: COMMERCIAL

## 2023-03-16 VITALS
HEART RATE: 80 BPM | TEMPERATURE: 98.4 F | SYSTOLIC BLOOD PRESSURE: 124 MMHG | DIASTOLIC BLOOD PRESSURE: 75 MMHG | OXYGEN SATURATION: 95 % | RESPIRATION RATE: 16 BRPM

## 2023-03-16 DIAGNOSIS — E66.01 MORBIDLY OBESE (HCC): Primary | ICD-10-CM

## 2023-03-16 DIAGNOSIS — I83.029 VENOUS ULCER OF LEFT LEG (HCC): ICD-10-CM

## 2023-03-16 DIAGNOSIS — L97.919 VENOUS ULCER OF RIGHT LEG (HCC): ICD-10-CM

## 2023-03-16 DIAGNOSIS — L97.929 VENOUS ULCER OF LEFT LEG (HCC): ICD-10-CM

## 2023-03-16 DIAGNOSIS — I89.0 LYMPHEDEMA: ICD-10-CM

## 2023-03-16 DIAGNOSIS — I83.019 VENOUS ULCER OF RIGHT LEG (HCC): ICD-10-CM

## 2023-03-16 DIAGNOSIS — I87.2 CHRONIC VENOUS INSUFFICIENCY: ICD-10-CM

## 2023-03-16 PROCEDURE — 29581 APPL MULTLAYER CMPRN SYS LEG: CPT

## 2023-03-16 RX ORDER — BACITRACIN ZINC AND POLYMYXIN B SULFATE 500; 1000 [USP'U]/G; [USP'U]/G
OINTMENT TOPICAL ONCE
OUTPATIENT
Start: 2023-03-16 | End: 2023-03-16

## 2023-03-16 RX ORDER — GENTAMICIN SULFATE 1 MG/G
OINTMENT TOPICAL ONCE
OUTPATIENT
Start: 2023-03-16 | End: 2023-03-16

## 2023-03-16 RX ORDER — GINSENG 100 MG
CAPSULE ORAL ONCE
OUTPATIENT
Start: 2023-03-16 | End: 2023-03-16

## 2023-03-16 RX ORDER — BETAMETHASONE DIPROPIONATE 0.05 %
OINTMENT (GRAM) TOPICAL ONCE
OUTPATIENT
Start: 2023-03-16 | End: 2023-03-16

## 2023-03-16 RX ORDER — BACITRACIN, NEOMYCIN, POLYMYXIN B 400; 3.5; 5 [USP'U]/G; MG/G; [USP'U]/G
OINTMENT TOPICAL ONCE
OUTPATIENT
Start: 2023-03-16 | End: 2023-03-16

## 2023-03-16 RX ORDER — CLOBETASOL PROPIONATE 0.5 MG/G
OINTMENT TOPICAL ONCE
OUTPATIENT
Start: 2023-03-16 | End: 2023-03-16

## 2023-03-16 ASSESSMENT — PAIN SCALES - GENERAL: PAINLEVEL_OUTOF10: 6

## 2023-03-16 ASSESSMENT — PAIN DESCRIPTION - ORIENTATION: ORIENTATION: LEFT

## 2023-03-16 ASSESSMENT — PAIN DESCRIPTION - LOCATION: LOCATION: LEG

## 2023-03-16 NOTE — DISCHARGE INSTRUCTIONS
Discharge Instructions from  [facility]  [address] [place] [address], [facility]0 [facility]  [phone] Fax [phone]    NAME:  Renetta Ford OF BIRTH:  1960  MEDICAL RECORD NUMBER:  [id number]  DATE: 3/16/2023    Wound Cleansing:   Do not scrub or use excessive force. Cleanse wound prior to applying a clean dressing with:  [] Normal Saline [] Keep Wound Dry in Shower    [] Wound Cleanser   [] Cleanse wound with Mild Soap & Water  [] May Shower at Discharge   [] Other:      Topical Treatments:  Do not apply lotions, creams, or ointments to wound bed unless directed. [] Apply moisturizing lotion to skin surrounding the wound prior to dressing change.  [] Apply antifungal ointment to skin surrounding the wound prior to dressing change.  [] Apply thin film of moisture barrier ointment to skin immediately around wound. [] Other:       Dressings:           Wound Location Bilateral Legs   [] Apply Primary Dressing:       [] MediHoney Gel [] Alginate with Silver [] Alginate   [] Collagen [] Collagen with Silver   [] Santyl with Moisten saline gauze     [] Hydrocolloid   [] MediHoney Alginate [] Foam with Silver   [] Foam   [] Hydrofera Blue    [] Mepilex Border    [] Moisten with Saline [] Hydrogel [] Mepitel     [] Bactroban/Mupirocin [] Polysporin  [] Other:    [] Pack wound loosely with  [] Iodoform   [] Plain Packing  [] Other   [] Cover and Secure with:     [] Gauze [] Rebecca [] Kerlix   [] Ace Wrap [] Cover Roll Tape [] ABD     [] Other:    Avoid contact of tape with skin.   [] Change dressing: [] Daily    [] Every Other Day [] Three times per week   [] Once a week [x] Do Not Change Dressing   [] Other:         Edema Control:  Apply: [] Compression Stocking []Right Leg []Left Leg   [] Tubigrip []Right Leg Double Layer []Left Leg Double Layer       []Right Leg Single Layer []Left Leg Single Layer   [] SpandaGrip []Right Leg  []Left Leg      []Low compression 5-10 mm/Hg      []Medium compression 10-20 mm/Hg     []High compression  20-30 mm/Hg   every morning immediately when getting up should be applied to affected leg(s) from mid foot to knee making sure to cover the heel. Remove every night before going to bed. [x] Elevate leg(s) above the level of the heart when sitting. [x] Avoid prolonged standing in one place. Compression:  Apply: [x] Multilayer Compression Wrap Applied in Clinic [x]RightLeg [x]Left Leg   [x] Multi-layer compression. Do not get leg(s) with wrap wet. If wraps become too tight call the center or completely remove the wrap. [x] Elevate leg(s) above the level of the heart when sitting. [x] Avoid prolonged standing in one place. Dietary:  [x] Diet as tolerated: [] Calorie Diabetic Diet: [] No Added Salt:  [] Increase Protein: [] Other:       Activity:  [x] Activity as tolerated:  [] Patient has no activity restrictions     [] Strict Bedrest: [] Remain off Work:     [] May return to full duty work:                                   [] Return to work with restrictions:             Return Appointment:  [] Wound and dressing supply provider:   [] ECF or Home Healthcare:  [] Wound Assessment: [] Physician or NP scheduled for Wound Assessment:   [x] Return Appointment: With Dr. Lissett Villasenor in  [address] [place] [address])  [] Ordered tests:      Electronically signed Yayacori Vaughn RN on 3/16/2023 at 12:06 PM     [address]: Should you experience any significant changes in your wound(s) or have questions about your wound care, please contact the [facility] at [address] [place] [address] Street 8:00 am - 4:30. If you need help with your wound outside these hours and cannot wait until we are again available, contact your PCP or go to the hospital emergency room. PLEASE NOTE: IF YOU ARE UNABLE TO OBTAIN WOUND SUPPLIES, CONTINUE TO USE THE SUPPLIES YOU HAVE AVAILABLE UNTIL YOU ARE ABLE TO REACH US.  IT IS MOST IMPORTANT TO KEEP THE WOUND COVERED AT ALL TIMES.      Physician Signature:_______________________    Date: ___________ Time:  ____________

## 2023-03-16 NOTE — FLOWSHEET NOTE
03/16/23 0930   Wound 03/02/23 Leg Right; Lower scattered open areas   Date First Assessed/Time First Assessed: 03/02/23 1613   Present on Hospital Admission: Yes  Wound Approximate Age at First Assessment (Weeks): 1 weeks  Primary Wound Type: Venous Ulcer  Location: Leg  Wound Location Orientation: Right; Lower  Wound De. .. Wound Image     Wound Etiology Venous   Dressing Status Intact   Wound Cleansed Wound cleanser   Dressing/Treatment Alginate with Ag   Offloading for Diabetic Foot Ulcers Offloading not required   Dressing Change Due 03/23/23   Wound Length (cm)   (small open areas)   Wound Assessment Epithelialization   Drainage Amount Small   Drainage Description Serous   Odor None   Arlen-wound Assessment Dry/flaky   Margins Attached edges   Wound Thickness Description not for Pressure Injury Partial thickness   Wound 03/02/23 Pretibial Left scattered open areas   Date First Assessed/Time First Assessed: 03/02/23 1614   Present on Hospital Admission: Yes  Wound Approximate Age at First Assessment (Weeks): 1 weeks  Primary Wound Type: Venous Ulcer  Location: Pretibial  Wound Location Orientation: Left  Wound Luis. ..    Wound Image     Wound Etiology Venous   Dressing Status Old drainage noted   Wound Cleansed Wound cleanser   Dressing/Treatment Alginate with Ag   Offloading for Diabetic Foot Ulcers Offloading not required   Dressing Change Due 03/23/23   Wound Length (cm)   (small scattered areas)   Wound Assessment Epithelialization;Pink/red   Drainage Amount Scant   Drainage Description Serosanguinous   Odor None   Arlen-wound Assessment Dry/flaky   Margins Attached edges   Wound Thickness Description not for Pressure Injury Partial thickness   Wound Follow Up   Require Follow Up Yes     Multilayer Compression Wrap   (Not Unna) Below the Knee    NAME:  Henry Lundberg  YOB: 1960  MEDICAL RECORD NUMBER:  [id number]  DATE:  3/16/2023    Multilayer compression wrap: Removed old Multilayer wrap if indicated and wash leg with mild soap/water. Applied moisturizing agent to dry skin as needed. Applied primary and secondary dressing as ordered. Applied multilayered dressing below the knee to right lower leg. Applied multilayered dressing below the knee to left lower leg. Instructed patient/caregiver not to remove dressing and to keep it clean and dry. Instructed patient/caregiver on complications to report to provider, such as pain, numbness in toes, heavy drainage, and slippage of dressing. Instructed patient on purpose of compression dressing and on activity and exercise recommendations.       Electronically signed by Socorro Bermudez RN on 3/16/2023 at 5:18 PM

## 2023-03-16 NOTE — WOUND CARE
[facility]   Progress Note   Referring Provider: MD Nadia- PCP  Reason for Referral: recurrent LE wound    Herlinda Aleman  MEDICAL RECORD NUMBER:  [id number]  AGE: 63 y.o. GENDER: male  : 1960  EPISODE DATE:  3/16/2023    Chief complaint and reason for visit:     BL leg wounds      HPI on initial evaluation 2023:      Herlinda Aleman is a 63 y.o. male who presents today for an initial evaluation of a wound/ulcer. Patient is returning to the wound center for a recurrent or new wound. He was last seen here in 2022, at which time his lower extremity wound healed and he was discharged. He has lymphedema pump and uses it regularly. Wound duration:  4 weeks or more on initial visit. History of Wound Context: venous insuffiiciency and diabetes wound  Pertinent associated symptoms: swelling    Contributing factors:   Diabetes: yes-- HB A1C--N/A  Vascular: venous insufficiency and lymphedema  Smoking: No     Today's Visit 3/16/2023:  Here alone for weekly visit. Left leg posterior part with pain- 3/10-- no exacerbating factors. Denies fever or chills. Denies N/V. Wound lesions are better on this visit. Doing ok with compression wrapping at home. Past Medical History:   Diagnosis Date    Chronic venous insufficiency     Diabetes ([address]     Hypertension     Morbid obesity ([address]       PAST SURGICAL HISTORY  No past surgical history on file. FAMILY HISTORY  No family history on file.     SOCIAL HISTORY  Social History     Tobacco Use    Smoking status: Never    Smokeless tobacco: Never   Substance Use Topics    Alcohol use: Not Currently    Drug use: Not Currently       ALLERGIES  No Known Allergies    MEDICATIONS  Current Outpatient Medications on File Prior to Encounter   Medication Sig Dispense Refill    dulaglutide (TRULICITY) 1.5 DX/6.5JJ SC injection Inject 0.5 mLs into the skin once a week      meloxicam (MOBIC) 7.5 MG tablet Take 7.5 mg by mouth daily      amLODIPine (NORVASC) 10 MG tablet Take 10 mg by mouth daily      atorvastatin (LIPITOR) 20 MG tablet Take 20 mg by mouth daily      carvedilol (COREG) 25 MG tablet Take 25 mg by mouth 2 times daily (with meals)      furosemide (LASIX) 20 MG tablet Take 20 mg by mouth daily      indomethacin (INDOCIN) 25 MG capsule Take 25 mg by mouth 2 times daily (Patient not taking: Reported on 3/2/2023)      insulin glargine (LANTUS) 100 UNIT/ML injection vial Inject 10 Units into the skin      lisinopril-hydroCHLOROthiazide (PRINZIDE;ZESTORETIC) 10-12.5 MG per tablet Take 1 tablet by mouth daily (Patient not taking: Reported on 3/2/2023)       No current facility-administered medications on file prior to encounter. REVIEW OF SYSTEMS  A comprehensive review of systems was negative except for: A comprehensive review of systems was negative. Except as mentioned in HPI    Objective:      /75   Pulse 80   Temp 98.4 °F (36.9 °C) (Oral)   Resp 16   SpO2 95%     Wt Readings from Last 3 Encounters:   03/09/23 257 lb (116.6 kg)       PHYSICAL EXAM  General Appearance/Constitutional: alert and oriented to person, place and time,  and in no acute distress. Nontoxic. WN and obese. Head: normocephalic and atraumatic. Eyes: extraocular eye movements intact, conjunctivae normal, and sclera anicteric. ENT: hearing grossly normal bilaterally. Normal appearance. Pulmonary/Chest: No respiratory distress. GI: abdomen soft, non-tender and non-distended. Musculoskeletal: normal range of motion of joints. Nontender calves. No cyanosis. Nontender calves. Lower extremity Edema 2+ on Right LE and 3+ on left LE  Good and palpable pulse both feet- d.pedis  Neurologic: no gross cranial nerve deficit and speech normal. No focal deficits.  Mental status normal.  Skin: warm and dry, no rash  --wound per [facility] documentation    Problem list for wound clinic:      Bilateral leg ulcers with edema-- left > Right  DM--on treatment  Venous insufficiency  Lymphedema      Wounds and Treatment Plan:  Cont compression treatment  No debridement done today    Other diagnoses or problems addressed:  DM- stressed DM control    Pertinent labs reviewed. No recent labs to review  Review of medical records and external note (s) from other providers was done for this visit. New lab or imaging orders placed:  No    Written patient dismissal instructions given to patient and signed by patient or POA. Patient voiced understanding that the importance of adherence to instructions is paramount to wound healing improvement or success.      Electronically signed by Jenna Olsen MD on 3/16/2023 at 9:47 AM

## 2023-03-23 ENCOUNTER — HOSPITAL ENCOUNTER (OUTPATIENT)
Facility: HOSPITAL | Age: 63
Discharge: HOME | End: 2023-03-23
Payer: COMMERCIAL

## 2023-03-23 VITALS
TEMPERATURE: 98.5 F | RESPIRATION RATE: 16 BRPM | OXYGEN SATURATION: 96 % | HEART RATE: 78 BPM | SYSTOLIC BLOOD PRESSURE: 110 MMHG | DIASTOLIC BLOOD PRESSURE: 59 MMHG

## 2023-03-23 DIAGNOSIS — I83.019 VENOUS ULCER OF RIGHT LEG (HCC): ICD-10-CM

## 2023-03-23 DIAGNOSIS — I83.029 VENOUS ULCER OF LEFT LEG (HCC): ICD-10-CM

## 2023-03-23 DIAGNOSIS — I89.0 LYMPHEDEMA: ICD-10-CM

## 2023-03-23 DIAGNOSIS — L97.919 VENOUS ULCER OF RIGHT LEG (HCC): ICD-10-CM

## 2023-03-23 DIAGNOSIS — I87.2 CHRONIC VENOUS INSUFFICIENCY: ICD-10-CM

## 2023-03-23 DIAGNOSIS — E66.01 MORBIDLY OBESE (HCC): Primary | ICD-10-CM

## 2023-03-23 DIAGNOSIS — L97.929 VENOUS ULCER OF LEFT LEG (HCC): ICD-10-CM

## 2023-03-23 PROCEDURE — 29581 APPL MULTLAYER CMPRN SYS LEG: CPT

## 2023-03-23 RX ORDER — GENTAMICIN SULFATE 1 MG/G
OINTMENT TOPICAL ONCE
Status: CANCELLED | OUTPATIENT
Start: 2023-03-23 | End: 2023-03-23

## 2023-03-23 RX ORDER — BACITRACIN, NEOMYCIN, POLYMYXIN B 400; 3.5; 5 [USP'U]/G; MG/G; [USP'U]/G
OINTMENT TOPICAL ONCE
OUTPATIENT
Start: 2023-03-23 | End: 2023-03-23

## 2023-03-23 RX ORDER — BACITRACIN ZINC AND POLYMYXIN B SULFATE 500; 1000 [USP'U]/G; [USP'U]/G
OINTMENT TOPICAL ONCE
Status: CANCELLED | OUTPATIENT
Start: 2023-03-23 | End: 2023-03-23

## 2023-03-23 RX ORDER — BACITRACIN ZINC AND POLYMYXIN B SULFATE 500; 1000 [USP'U]/G; [USP'U]/G
OINTMENT TOPICAL ONCE
OUTPATIENT
Start: 2023-03-23 | End: 2023-03-23

## 2023-03-23 RX ORDER — BETAMETHASONE DIPROPIONATE 0.05 %
OINTMENT (GRAM) TOPICAL ONCE
OUTPATIENT
Start: 2023-03-23 | End: 2023-03-23

## 2023-03-23 RX ORDER — GINSENG 100 MG
CAPSULE ORAL ONCE
OUTPATIENT
Start: 2023-03-23 | End: 2023-03-23

## 2023-03-23 RX ORDER — BETAMETHASONE DIPROPIONATE 0.05 %
OINTMENT (GRAM) TOPICAL ONCE
Status: CANCELLED | OUTPATIENT
Start: 2023-03-23 | End: 2023-03-23

## 2023-03-23 RX ORDER — CLOBETASOL PROPIONATE 0.5 MG/G
OINTMENT TOPICAL ONCE
Status: CANCELLED | OUTPATIENT
Start: 2023-03-23 | End: 2023-03-23

## 2023-03-23 RX ORDER — CLOBETASOL PROPIONATE 0.5 MG/G
OINTMENT TOPICAL ONCE
OUTPATIENT
Start: 2023-03-23 | End: 2023-03-23

## 2023-03-23 RX ORDER — GENTAMICIN SULFATE 1 MG/G
OINTMENT TOPICAL ONCE
OUTPATIENT
Start: 2023-03-23 | End: 2023-03-23

## 2023-03-23 RX ORDER — GINSENG 100 MG
CAPSULE ORAL ONCE
Status: CANCELLED | OUTPATIENT
Start: 2023-03-23 | End: 2023-03-23

## 2023-03-23 RX ORDER — BACITRACIN, NEOMYCIN, POLYMYXIN B 400; 3.5; 5 [USP'U]/G; MG/G; [USP'U]/G
OINTMENT TOPICAL ONCE
Status: CANCELLED | OUTPATIENT
Start: 2023-03-23 | End: 2023-03-23

## 2023-03-23 ASSESSMENT — PAIN SCALES - GENERAL: PAINLEVEL_OUTOF10: 5

## 2023-03-23 NOTE — WOUND CARE
daily      amLODIPine (NORVASC) 10 MG tablet Take 10 mg by mouth daily      atorvastatin (LIPITOR) 20 MG tablet Take 20 mg by mouth daily      carvedilol (COREG) 25 MG tablet Take 25 mg by mouth 2 times daily (with meals)      furosemide (LASIX) 20 MG tablet Take 20 mg by mouth daily      indomethacin (INDOCIN) 25 MG capsule Take 25 mg by mouth 2 times daily (Patient not taking: Reported on 3/2/2023)      insulin glargine (LANTUS) 100 UNIT/ML injection vial Inject 10 Units into the skin      lisinopril-hydroCHLOROthiazide (PRINZIDE;ZESTORETIC) 10-12.5 MG per tablet Take 1 tablet by mouth daily (Patient not taking: Reported on 3/2/2023)       No current facility-administered medications on file prior to encounter. REVIEW OF SYSTEMS  A comprehensive review of systems was negative except for: A comprehensive review of systems was negative. Except as mentioned in HPI    Objective:      BP (!) 110/59   Pulse 78   Temp 98.5 °F (36.9 °C) (Oral)   Resp 16   SpO2 96%     Wt Readings from Last 3 Encounters:   03/09/23 257 lb (116.6 kg)       PHYSICAL EXAM  General Appearance/Constitutional: alert and oriented to person, place and time,  and in no acute distress. Nontoxic. WN and obese. Head: normocephalic and atraumatic. Eyes: extraocular eye movements intact, conjunctivae normal, and sclera anicteric. ENT: hearing grossly normal bilaterally. Normal appearance. Pulmonary/Chest: No respiratory distress. GI: abdomen soft, non-tender and non-distended. Musculoskeletal: normal range of motion of joints. Nontender calves. No cyanosis. Nontender calves. Lower extremity Edema 2+ on Right LE and 3+ on left LE  Good and palpable pulse both feet- d.pedis  Neurologic: no gross cranial nerve deficit and speech normal. No focal deficits.  Mental status normal.  Skin: warm and dry, no rash  --wound per LDA documentation    Problem list for wound clinic:      Bilateral leg ulcers with edema-- left > Right  DM--on

## 2023-03-23 NOTE — DISCHARGE INSTRUCTIONS
WOUND COVERED AT ALL TIMES.      Physician Signature:_______________________    Date: ___________ Time:  ____________

## 2023-03-23 NOTE — FLOWSHEET NOTE
soap/water. Applied moisturizing agent to dry skin as needed. Applied primary and secondary dressing as ordered. Applied multilayered dressing below the knee to right lower leg. Applied multilayered dressing below the knee to left lower leg. Instructed patient/caregiver not to remove dressing and to keep it clean and dry. Instructed patient/caregiver on complications to report to provider, such as pain, numbness in toes, heavy drainage, and slippage of dressing. Instructed patient on purpose of compression dressing and on activity and exercise recommendations.       Electronically signed by Jessica Alvarez RN on 3/23/2023 at 4:10 PM

## 2023-03-30 ENCOUNTER — HOSPITAL ENCOUNTER (OUTPATIENT)
Facility: HOSPITAL | Age: 63
Discharge: HOME | End: 2023-03-30
Payer: COMMERCIAL

## 2023-03-30 VITALS
DIASTOLIC BLOOD PRESSURE: 64 MMHG | SYSTOLIC BLOOD PRESSURE: 115 MMHG | RESPIRATION RATE: 18 BRPM | HEART RATE: 81 BPM | TEMPERATURE: 98 F

## 2023-03-30 DIAGNOSIS — I83.029 VENOUS ULCER OF LEFT LEG (HCC): ICD-10-CM

## 2023-03-30 DIAGNOSIS — I83.019 VENOUS ULCER OF RIGHT LEG (HCC): ICD-10-CM

## 2023-03-30 DIAGNOSIS — E66.01 MORBIDLY OBESE (HCC): Primary | ICD-10-CM

## 2023-03-30 DIAGNOSIS — L97.919 VENOUS ULCER OF RIGHT LEG (HCC): ICD-10-CM

## 2023-03-30 DIAGNOSIS — I87.2 CHRONIC VENOUS INSUFFICIENCY: ICD-10-CM

## 2023-03-30 DIAGNOSIS — I89.0 LYMPHEDEMA: ICD-10-CM

## 2023-03-30 DIAGNOSIS — L97.929 VENOUS ULCER OF LEFT LEG (HCC): ICD-10-CM

## 2023-03-30 PROCEDURE — 99213 OFFICE O/P EST LOW 20 MIN: CPT

## 2023-03-30 RX ORDER — BACITRACIN, NEOMYCIN, POLYMYXIN B 400; 3.5; 5 [USP'U]/G; MG/G; [USP'U]/G
OINTMENT TOPICAL ONCE
OUTPATIENT
Start: 2023-03-30 | End: 2023-03-30

## 2023-03-30 RX ORDER — GENTAMICIN SULFATE 1 MG/G
OINTMENT TOPICAL ONCE
OUTPATIENT
Start: 2023-03-30 | End: 2023-03-30

## 2023-03-30 RX ORDER — CLOBETASOL PROPIONATE 0.5 MG/G
OINTMENT TOPICAL ONCE
OUTPATIENT
Start: 2023-03-30 | End: 2023-03-30

## 2023-03-30 RX ORDER — BETAMETHASONE DIPROPIONATE 0.05 %
OINTMENT (GRAM) TOPICAL ONCE
OUTPATIENT
Start: 2023-03-30 | End: 2023-03-30

## 2023-03-30 RX ORDER — GINSENG 100 MG
CAPSULE ORAL ONCE
OUTPATIENT
Start: 2023-03-30 | End: 2023-03-30

## 2023-03-30 RX ORDER — BACITRACIN ZINC AND POLYMYXIN B SULFATE 500; 1000 [USP'U]/G; [USP'U]/G
OINTMENT TOPICAL ONCE
OUTPATIENT
Start: 2023-03-30 | End: 2023-03-30

## 2023-03-30 ASSESSMENT — PAIN SCALES - GENERAL: PAINLEVEL_OUTOF10: 3

## 2023-03-30 NOTE — FLOWSHEET NOTE
03/30/23 0957   Wound 03/02/23 Leg Right; Lower scattered open areas   Date First Assessed/Time First Assessed: 03/02/23 1613   Present on Hospital Admission: Yes  Wound Approximate Age at First Assessment (Weeks): 1 weeks  Primary Wound Type: Venous Ulcer  Location: Leg  Wound Location Orientation: Right; Lower  Wound De. .. Wound Image     Wound Etiology Venous   Dressing Status Intact   Wound Cleansed Wound cleanser   Dressing/Treatment   (Farrow wrap applied)   Dressing Change Due   (pt discharged from clinic)   Wound Assessment Epithelialization   Drainage Amount None   Odor None   Arlen-wound Assessment Dry/flaky   Wound 03/02/23 Pretibial Left scattered open areas   Date First Assessed/Time First Assessed: 03/02/23 1614   Present on Hospital Admission: Yes  Wound Approximate Age at First Assessment (Weeks): 1 weeks  Primary Wound Type: Venous Ulcer  Location: Pretibial  Wound Location Orientation: Left  Wound Luis. .. Wound Image     Wound Etiology Venous   Dressing Status Intact   Wound Cleansed Wound cleanser   Dressing/Treatment   (farrow wrap)   Dressing Change Due   (pt discharged from clinic)   Wound Assessment Epithelialization   Drainage Amount None   Odor None   Arlen-wound Assessment Dry/flaky     Pt discharged from wound clinic. Instructed to continue to use Lymphedema pumps and wear farrow wraps to both legs.

## 2023-03-31 NOTE — DISCHARGE INSTRUCTIONS
care, please contact the [facility] Main at [address] [place] [address] 8:00 am - 4:30. If you need help with your wound outside these hours and cannot wait until we are again available, contact your PCP or go to the hospital emergency room. PLEASE NOTE: IF YOU ARE UNABLE TO OBTAIN WOUND SUPPLIES, CONTINUE TO USE THE SUPPLIES YOU HAVE AVAILABLE UNTIL YOU ARE ABLE TO REACH US. IT IS MOST IMPORTANT TO KEEP THE WOUND COVERED AT ALL TIMES.      Physician Signature:_______________________    Date: ___________ Time:  ____________

## 2023-04-20 ENCOUNTER — HOSPITAL ENCOUNTER (OUTPATIENT)
Facility: HOSPITAL | Age: 63
Discharge: HOME | End: 2023-04-20
Payer: MEDICARE

## 2023-04-20 VITALS
HEART RATE: 88 BPM | RESPIRATION RATE: 16 BRPM | DIASTOLIC BLOOD PRESSURE: 65 MMHG | SYSTOLIC BLOOD PRESSURE: 112 MMHG | TEMPERATURE: 98.8 F

## 2023-04-20 PROCEDURE — 29581 APPL MULTLAYER CMPRN SYS LEG: CPT

## 2023-04-20 RX ORDER — SITAGLIPTIN AND METFORMIN HYDROCHLORIDE 1000; 100 MG/1; MG/1
TABLET, FILM COATED, EXTENDED RELEASE ORAL
COMMUNITY

## 2023-04-20 ASSESSMENT — PAIN DESCRIPTION - LOCATION: LOCATION: LEG

## 2023-04-20 ASSESSMENT — PAIN SCALES - GENERAL: PAINLEVEL_OUTOF10: 3

## 2023-04-20 ASSESSMENT — PAIN DESCRIPTION - ORIENTATION: ORIENTATION: RIGHT;LEFT

## 2023-04-20 NOTE — WOUND CARE
No cyanosis. Nontender calves. Lower extremity Edema 2+ on Right LE and 3+ on left LE  Good and palpable pulse both feet- d.pedis  Neurologic: no gross cranial nerve deficit and speech normal. No focal deficits. Mental status normal.  Skin: warm and dry, no rash  --see wound chart measurement and documentation    Problem list for wound clinic:      Bilateral leg ulcers with edema healed- recurrent open wound on Right leg on this visit  DM--on treatment  Venous insufficiency  Lymphedema      Wounds and Treatment Plan:  Cont compression treatment  No debridement indicated today    Other diagnoses or problems addressed:  DM- stressed DM control- need to bring labs. Stressed DM control    Pertinent labs reviewed. No recent labs to review  Review of medical records and external note (s) from other providers was done for this visit. New lab or imaging orders placed:  No    Written patient dismissal instructions given to patient and signed by patient or POA. Patient voiced understanding that the importance of adherence to instructions is paramount to wound healing improvement or success.      Electronically signed by Cherrie Anderson MD on 4/20/2023 at 11:25 AM

## 2023-04-27 ENCOUNTER — HOSPITAL ENCOUNTER (OUTPATIENT)
Facility: HOSPITAL | Age: 63
Discharge: HOME | End: 2023-04-27
Payer: MEDICARE

## 2023-04-27 VITALS
HEART RATE: 78 BPM | RESPIRATION RATE: 18 BRPM | SYSTOLIC BLOOD PRESSURE: 116 MMHG | DIASTOLIC BLOOD PRESSURE: 61 MMHG | TEMPERATURE: 98.4 F

## 2023-04-27 DIAGNOSIS — L97.919 VENOUS ULCER OF RIGHT LEG (HCC): ICD-10-CM

## 2023-04-27 DIAGNOSIS — L97.929 VENOUS ULCER OF LEFT LEG (HCC): ICD-10-CM

## 2023-04-27 DIAGNOSIS — I87.2 CHRONIC VENOUS INSUFFICIENCY: ICD-10-CM

## 2023-04-27 DIAGNOSIS — E66.01 MORBIDLY OBESE (HCC): Primary | ICD-10-CM

## 2023-04-27 DIAGNOSIS — I89.0 LYMPHEDEMA: ICD-10-CM

## 2023-04-27 DIAGNOSIS — I83.019 VENOUS ULCER OF RIGHT LEG (HCC): ICD-10-CM

## 2023-04-27 DIAGNOSIS — I83.029 VENOUS ULCER OF LEFT LEG (HCC): ICD-10-CM

## 2023-04-27 PROCEDURE — 99213 OFFICE O/P EST LOW 20 MIN: CPT

## 2023-04-27 RX ORDER — GENTAMICIN SULFATE 1 MG/G
OINTMENT TOPICAL ONCE
OUTPATIENT
Start: 2023-04-27 | End: 2023-04-27

## 2023-04-27 RX ORDER — BACITRACIN ZINC AND POLYMYXIN B SULFATE 500; 1000 [USP'U]/G; [USP'U]/G
OINTMENT TOPICAL ONCE
OUTPATIENT
Start: 2023-04-27 | End: 2023-04-27

## 2023-04-27 RX ORDER — BETAMETHASONE DIPROPIONATE 0.05 %
OINTMENT (GRAM) TOPICAL ONCE
OUTPATIENT
Start: 2023-04-27 | End: 2023-04-27

## 2023-04-27 RX ORDER — CLOBETASOL PROPIONATE 0.5 MG/G
OINTMENT TOPICAL ONCE
OUTPATIENT
Start: 2023-04-27 | End: 2023-04-27

## 2023-04-27 RX ORDER — BACITRACIN, NEOMYCIN, POLYMYXIN B 400; 3.5; 5 [USP'U]/G; MG/G; [USP'U]/G
OINTMENT TOPICAL ONCE
OUTPATIENT
Start: 2023-04-27 | End: 2023-04-27

## 2023-04-27 RX ORDER — GINSENG 100 MG
CAPSULE ORAL ONCE
OUTPATIENT
Start: 2023-04-27 | End: 2023-04-27

## 2023-04-27 ASSESSMENT — PAIN SCALES - GENERAL: PAINLEVEL_OUTOF10: 3

## 2023-04-27 NOTE — WOUND CARE
[facility]   Progress Note   Referring Provider: MD Nadia- PCP  Reason for Referral: recurrent LE wound    Oniel Chambers  MEDICAL RECORD NUMBER:  [id number]  AGE: 66 y.o. GENDER: male  : 1960  EPISODE DATE:  2023    Chief complaint and reason for visit:     BL leg wounds--recurrent- currently R leg only        HPI on initial evaluation 2023:      Oniel Chambers is a 66 y.o. male who presents today for an initial evaluation of a wound/ulcer. Patient is returning to the wound center for a recurrent or new wound. He was last seen here in 2022, at which time his lower extremity wound healed and he was discharged. He has lymphedema pump and uses it regularly. Wound duration:  4 weeks or more on initial visit. Recurrence appeared 1 week after 3/23/23- last visit    History of Wound Context: venous insuffiiciency and diabetes wound  Pertinent associated symptoms: swelling    Contributing factors:   Diabetes: yes-- HB A1C--N/A-- he is to bring labs in next visit  Vascular: venous insufficiency and lymphedema  Smoking: No     Today's Visit 2023:  Here alone for FU. On 3/23/23- his lesions on his legs were healed and discharged. He returned on - for recurrent R leg wound  Doing the compression treatment. R leg wound improving, healing  Don't have his labs from PCP office with him still. Review of Systems    Denies fever or chills or rigors  Denies  nausea or vomiting or abd pain or diarrhea  Denies chest pain, productive cough  Denies new joint pian or swelling  Denies dysuria/Flank pain/ hematuria  Denies rash/ itching     Past Medical History:   Diagnosis Date    Chronic venous insufficiency     Diabetes ([address]     Hypertension     Morbid obesity ([facility]       PAST SURGICAL HISTORY  No past surgical history on file. FAMILY HISTORY  No family history on file.     SOCIAL HISTORY  Social History     Tobacco Use    Smoking status: Never

## 2023-04-28 NOTE — DISCHARGE INSTRUCTIONS
Discharge Instructions from  [facility]  [address] [place], [facility]  [phone] Fax [phone]    NAME:  Ted Miller OF BIRTH:  1960  MEDICAL RECORD NUMBER:  [id number]  DATE: 4/27/2023    Wound Cleansing:   Do not scrub or use excessive force. Cleanse wound prior to applying a clean dressing with:  [] Normal Saline [] Keep Wound Dry in Shower    [] Wound Cleanser   [] Cleanse wound with Mild Soap & Water  [] May Shower at Discharge   [] Other:      Topical Treatments:  Do not apply lotions, creams, or ointments to wound bed unless directed. [] Apply moisturizing lotion to skin surrounding the wound prior to dressing change.  [] Apply antifungal ointment to skin surrounding the wound prior to dressing change.  [] Apply thin film of moisture barrier ointment to skin immediately around wound. [] Other:       Dressings:           Wound Location Bilateral Legs   [] Apply Primary Dressing:       [] MediHoney Gel [] Alginate with Silver [] Alginate   [] Collagen [] Collagen with Silver   [] Santyl with Moisten saline gauze     [] Hydrocolloid   [] MediHoney Alginate [] Foam with Silver   [] Foam   [] Hydrofera Blue    [] Mepilex Border    [] Moisten with Saline [] Hydrogel [] Mepitel     [] Bactroban/Mupirocin [] Polysporin  [] Other:    [] Pack wound loosely with  [] Iodoform   [] Plain Packing  [] Other   [] Cover and Secure with:     [] Gauze [] Rebecca [] Kerlix   [] Ace Wrap [] Cover Roll Tape [] ABD     [] Other:    Avoid contact of tape with skin.   [] Change dressing: [] Daily    [] Every Other Day [] Three times per week   [] Once a week [] Do Not Change Dressing   [] Other:         Edema Control:  Apply: [x] Compression Stocking :  Apply Farrow wraps daily to both legs   [] Tubigrip []Right Leg Double Layer []Left Leg Double Layer       []Right Leg Single Layer []Left Leg Single Layer   [] SpandaGrip []Right Leg  []Left Leg      []Low

## 2023-04-28 NOTE — FLOWSHEET NOTE
04/27/23 1207   Wound 03/02/23 Leg Right; Lower scattered open areas   Date First Assessed/Time First Assessed: 03/02/23 1613   Present on Hospital Admission: Yes  Wound Approximate Age at First Assessment (Weeks): 1 weeks  Primary Wound Type: Venous Ulcer  Location: Leg  Wound Location Orientation: Right; Lower  Wound De. .. Wound Image     Wound Etiology Venous   Dressing Status Intact   Wound Cleansed Wound cleanser   Dressing/Treatment   (Farrow wrap liner applied)   Offloading for Diabetic Foot Ulcers Offloading not required   Dressing Change Due 05/18/23   Wound Length (cm)   (areas clinically closed)   Wound Assessment Pink/red   Drainage Amount None   Drainage Description Serous   Odor None   Arlen-wound Assessment Dry/flaky   Wound 03/02/23 Pretibial Left scattered open areas   Date First Assessed/Time First Assessed: 03/02/23 1614   Present on Hospital Admission: Yes  Wound Approximate Age at First Assessment (Weeks): 1 weeks  Primary Wound Type: Venous Ulcer  Location: Pretibial  Wound Location Orientation: Left  Wound Luis. ..    Wound Image     Wound Etiology Venous   Dressing Status Intact   Wound Cleansed Wound cleanser   Dressing/Treatment Other (comment)  (Farrow wrap liner applied)   Offloading for Diabetic Foot Ulcers Offloading not required   Dressing Change Due 05/18/23   Wound Length (cm)   (areas clinically closed)   Wound Assessment Epithelialization   Drainage Amount None   Odor None   Arlen-wound Assessment Dry/flaky

## 2023-05-18 ENCOUNTER — HOSPITAL ENCOUNTER (OUTPATIENT)
Facility: HOSPITAL | Age: 63
Discharge: HOME | End: 2023-05-18
Payer: MEDICARE

## 2023-05-18 VITALS
HEART RATE: 72 BPM | SYSTOLIC BLOOD PRESSURE: 119 MMHG | RESPIRATION RATE: 16 BRPM | TEMPERATURE: 98.6 F | DIASTOLIC BLOOD PRESSURE: 64 MMHG

## 2023-05-18 DIAGNOSIS — I87.2 CHRONIC VENOUS INSUFFICIENCY: ICD-10-CM

## 2023-05-18 DIAGNOSIS — L97.919 VENOUS ULCER OF RIGHT LEG (HCC): Primary | ICD-10-CM

## 2023-05-18 DIAGNOSIS — I83.019 VENOUS ULCER OF RIGHT LEG (HCC): Primary | ICD-10-CM

## 2023-05-18 DIAGNOSIS — L97.929 VENOUS ULCER OF LEFT LEG (HCC): ICD-10-CM

## 2023-05-18 DIAGNOSIS — E66.01 MORBIDLY OBESE (HCC): ICD-10-CM

## 2023-05-18 DIAGNOSIS — I83.029 VENOUS ULCER OF LEFT LEG (HCC): ICD-10-CM

## 2023-05-18 DIAGNOSIS — I89.0 LYMPHEDEMA: ICD-10-CM

## 2023-05-18 PROCEDURE — 29581 APPL MULTLAYER CMPRN SYS LEG: CPT

## 2023-05-18 ASSESSMENT — PAIN SCALES - GENERAL: PAINLEVEL_OUTOF10: 3

## 2023-05-18 ASSESSMENT — PAIN DESCRIPTION - LOCATION: LOCATION: LEG

## 2023-05-18 ASSESSMENT — PAIN DESCRIPTION - ORIENTATION: ORIENTATION: RIGHT;LEFT

## 2023-05-18 NOTE — DISCHARGE INSTRUCTIONS
Discharge Instructions from  [facility]  [address] [place], [address], [address]  [phone] Fax [phone]    NAME:  Bety Poster OF BIRTH:  1960  MEDICAL RECORD NUMBER:  [id number]  DATE:  5/18/2023    Wound Cleansing:   Do not scrub or use excessive force. Cleanse wound prior to applying a clean dressing with:  [] Normal Saline [] Keep Wound Dry in Shower    [] Wound Cleanser   [] Cleanse wound with Mild Soap & Water  [] May Shower at Discharge   [] Other:      Topical Treatments:  Do not apply lotions, creams, or ointments to wound bed unless directed. [] Apply moisturizing lotion to skin surrounding the wound prior to dressing change.  [] Apply antifungal ointment to skin surrounding the wound prior to dressing change.  [] Apply thin film of moisture barrier ointment to skin immediately around wound. [] Other:       Dressings:           Wound Location Bilateral Legs   [] Apply Primary Dressing:       [] MediHoney Gel [] Alginate with Silver [] Alginate   [] Collagen [] Collagen with Silver   [] Santyl with Moisten saline gauze     [] Hydrocolloid   [] MediHoney Alginate [] Foam with Silver   [] Foam   [] Hydrofera Blue    [] Mepilex Border    [] Moisten with Saline [] Hydrogel [] Mepitel     [] Bactroban/Mupirocin [] Polysporin  [] Other:    [] Pack wound loosely with  [] Iodoform   [] Plain Packing  [] Other   [] Cover and Secure with:     [] Gauze [] Rebecca [] Kerlix   [] Ace Wrap [] Cover Roll Tape [] ABD     [] Other:    Avoid contact of tape with skin.   [] Change dressing: [] Daily    [] Every Other Day [] Three times per week   [] Once a week [x] Do Not Change Dressing   [] Other:             Edema Control:  Apply: [] Compression Stocking []Right Leg []Left Leg   [] Tubigrip []Right Leg Double Layer []Left Leg Double Layer       []Right Leg Single Layer []Left Leg Single Layer   [] SpandaGrip []Right Leg  []Left Leg      []Low compression

## 2023-05-18 NOTE — FLOWSHEET NOTE
05/18/23 1529   Wound 03/02/23 Leg Right; Lower scattered open areas   Date First Assessed/Time First Assessed: 03/02/23 1613   Present on Hospital Admission: Yes  Wound Approximate Age at First Assessment (Weeks): 1 weeks  Primary Wound Type: Venous Ulcer  Location: Leg  Wound Location Orientation: Right; Lower  Wound De. .. Wound Image     Wound Etiology Venous   Dressing Status Intact   Wound Cleansed Wound cleanser   Dressing/Treatment Xeroform;Dry dressing;Roll gauze  (2 layer wrap)   Offloading for Diabetic Foot Ulcers Offloading not required   Dressing Change Due 05/25/23   Wound Length (cm)   (scattered open areas)   Wound Assessment Pink/red   Drainage Amount Moderate   Drainage Description Serous   Odor None   Arlen-wound Assessment Dry/flaky;Edematous   Margins Attached edges   Wound Thickness Description not for Pressure Injury Partial thickness   Wound 03/02/23 Pretibial Left scattered open areas   Date First Assessed/Time First Assessed: 03/02/23 1614   Present on Hospital Admission: Yes  Wound Approximate Age at First Assessment (Weeks): 1 weeks  Primary Wound Type: Venous Ulcer  Location: Pretibial  Wound Location Orientation: Left  Wound Luis. ..    Wound Image     Wound Etiology Venous   Dressing Status Intact   Wound Cleansed Wound cleanser   Dressing/Treatment Dry dressing;Roll gauze  (2 layer wrap)   Offloading for Diabetic Foot Ulcers Offloading not required   Dressing Change Due 05/25/23   Wound Length (cm)   (scattered small openings)   Wound Assessment Epithelialization;Pink/red   Drainage Amount Small   Drainage Description Serous   Odor Fecal   Arlen-wound Assessment Dry/flaky;Edematous   Margins Attached edges   Wound Thickness Description not for Pressure Injury Partial thickness     Multilayer Compression Wrap   (Not Unna) Below the Knee    NAME:  Mirella Trejo  YOB: 1960  MEDICAL RECORD NUMBER:  [id number]  DATE:  5/18/2023    Multilayer compression wrap: Applied

## 2023-05-19 NOTE — WOUND CARE
control- need to bring labs. Pertinent labs reviewed. No recent labs to review  Review of medical records and external note (s) from other providers was done for this visit. New lab or imaging orders placed:  No    Written patient dismissal instructions given to patient and signed by patient or POA. Patient voiced understanding that the importance of adherence to instructions is paramount to wound healing improvement or success. FU as scheduled.     Electronically signed by Ivy Sosa MD on 5/18/2023

## 2023-05-25 ENCOUNTER — HOSPITAL ENCOUNTER (OUTPATIENT)
Facility: HOSPITAL | Age: 63
Discharge: HOME | End: 2023-05-25
Payer: MEDICARE

## 2023-05-25 VITALS
HEART RATE: 69 BPM | OXYGEN SATURATION: 99 % | SYSTOLIC BLOOD PRESSURE: 106 MMHG | DIASTOLIC BLOOD PRESSURE: 68 MMHG | TEMPERATURE: 97.7 F | RESPIRATION RATE: 16 BRPM

## 2023-05-25 DIAGNOSIS — E66.01 MORBIDLY OBESE (HCC): Primary | ICD-10-CM

## 2023-05-25 DIAGNOSIS — I87.2 CHRONIC VENOUS INSUFFICIENCY: ICD-10-CM

## 2023-05-25 DIAGNOSIS — I83.029 VENOUS ULCER OF LEFT LEG (HCC): ICD-10-CM

## 2023-05-25 DIAGNOSIS — L97.929 VENOUS ULCER OF LEFT LEG (HCC): ICD-10-CM

## 2023-05-25 DIAGNOSIS — I83.019 VENOUS ULCER OF RIGHT LEG (HCC): ICD-10-CM

## 2023-05-25 DIAGNOSIS — L97.919 VENOUS ULCER OF RIGHT LEG (HCC): ICD-10-CM

## 2023-05-25 DIAGNOSIS — I89.0 LYMPHEDEMA: ICD-10-CM

## 2023-05-25 PROCEDURE — 29581 APPL MULTLAYER CMPRN SYS LEG: CPT

## 2023-05-25 RX ORDER — GINSENG 100 MG
CAPSULE ORAL ONCE
OUTPATIENT
Start: 2023-05-25 | End: 2023-05-25

## 2023-05-25 RX ORDER — CLOBETASOL PROPIONATE 0.5 MG/G
OINTMENT TOPICAL ONCE
OUTPATIENT
Start: 2023-05-25 | End: 2023-05-25

## 2023-05-25 RX ORDER — BACITRACIN, NEOMYCIN, POLYMYXIN B 400; 3.5; 5 [USP'U]/G; MG/G; [USP'U]/G
OINTMENT TOPICAL ONCE
OUTPATIENT
Start: 2023-05-25 | End: 2023-05-25

## 2023-05-25 RX ORDER — BACITRACIN ZINC AND POLYMYXIN B SULFATE 500; 1000 [USP'U]/G; [USP'U]/G
OINTMENT TOPICAL ONCE
OUTPATIENT
Start: 2023-05-25 | End: 2023-05-25

## 2023-05-25 RX ORDER — BETAMETHASONE DIPROPIONATE 0.05 %
OINTMENT (GRAM) TOPICAL ONCE
OUTPATIENT
Start: 2023-05-25 | End: 2023-05-25

## 2023-05-25 RX ORDER — GENTAMICIN SULFATE 1 MG/G
OINTMENT TOPICAL ONCE
OUTPATIENT
Start: 2023-05-25 | End: 2023-05-25

## 2023-05-25 ASSESSMENT — PAIN DESCRIPTION - ORIENTATION: ORIENTATION: RIGHT;LEFT

## 2023-05-25 ASSESSMENT — PAIN DESCRIPTION - DESCRIPTORS: DESCRIPTORS: ACHING

## 2023-05-25 ASSESSMENT — PAIN DESCRIPTION - LOCATION: LOCATION: LEG

## 2023-05-25 ASSESSMENT — PAIN SCALES - GENERAL: PAINLEVEL_OUTOF10: 2

## 2023-05-25 NOTE — FLOWSHEET NOTE
05/25/23 0949   Wound 03/02/23 Leg Right; Lower scattered open areas   Date First Assessed/Time First Assessed: 03/02/23 1613   Present on Hospital Admission: Yes  Wound Approximate Age at First Assessment (Weeks): 1 weeks  Primary Wound Type: Venous Ulcer  Location: Leg  Wound Location Orientation: Right; Lower  Wound De. .. Wound Image     Wound Etiology Venous   Dressing Status Intact   Wound Cleansed Wound cleanser   Dressing/Treatment Betadine swabs/povidone iodine;Alginate;Dry dressing;Roll gauze  (2 layer wrap)   Offloading for Diabetic Foot Ulcers Offloading not required   Dressing Change Due 06/01/23   Wound Length (cm) 8 cm  (superficial area)   Wound Width (cm) 6.5 cm   Wound Depth (cm) 0.1 cm   Wound Surface Area (cm^2) 52 cm^2   Wound Volume (cm^3) 5.2 cm^3   Wound Assessment Pink/red   Drainage Amount Moderate   Drainage Description Serous   Odor None   Arlen-wound Assessment Dry/flaky; Maceration   Margins Undefined edges   Wound Thickness Description not for Pressure Injury Partial thickness   Wound 03/02/23 Pretibial Left scattered open areas   Date First Assessed/Time First Assessed: 03/02/23 1614   Present on Hospital Admission: Yes  Wound Approximate Age at First Assessment (Weeks): 1 weeks  Primary Wound Type: Venous Ulcer  Location: Pretibial  Wound Location Orientation: Left  Wound Lius. ..    Wound Image     Wound Etiology Venous   Dressing Status Intact   Wound Cleansed Wound cleanser   Dressing/Treatment Dry dressing;Roll gauze  (2 layer wrap)   Offloading for Diabetic Foot Ulcers Offloading not required   Dressing Change Due 06/01/23   Wound Length (cm)   (small scattered openings)   Wound Assessment Epithelialization;Pink/red   Drainage Amount Small   Drainage Description Serous   Odor None   Arlen-wound Assessment Dry/flaky;Edematous   Margins Attached edges   Wound Thickness Description not for Pressure Injury Partial thickness       Multilayer Compression Wrap   (Not Unna) Below the

## 2023-05-25 NOTE — DISCHARGE INSTRUCTIONS
Discharge Instructions from  [facility]  [address] [place] [address], [facility]  [phone] Fax [phone]    NAME:  Vanessa Santos OF BIRTH:  1960  MEDICAL RECORD NUMBER:  [id number]  DATE: 5/25/2023    Wound Cleansing:   Do not scrub or use excessive force. Cleanse wound prior to applying a clean dressing with:  [] Normal Saline [] Keep Wound Dry in Shower    [] Wound Cleanser   [] Cleanse wound with Mild Soap & Water  [] May Shower at Discharge   [] Other:      Topical Treatments:  Do not apply lotions, creams, or ointments to wound bed unless directed. [] Apply moisturizing lotion to skin surrounding the wound prior to dressing change.  [] Apply antifungal ointment to skin surrounding the wound prior to dressing change.  [] Apply thin film of moisture barrier ointment to skin immediately around wound. [] Other:       Dressings:           Wound Location Bilateral Legs   [] Apply Primary Dressing:       [] MediHoney Gel [] Alginate with Silver [] Alginate   [] Collagen [] Collagen with Silver   [] Santyl with Moisten saline gauze     [] Hydrocolloid   [] MediHoney Alginate [] Foam with Silver   [] Foam   [] Hydrofera Blue    [] Mepilex Border    [] Moisten with Saline [] Hydrogel [] Mepitel     [] Bactroban/Mupirocin [] Polysporin  [] Other:    [] Pack wound loosely with  [] Iodoform   [] Plain Packing  [] Other   [] Cover and Secure with:     [] Gauze [] Rebecca [] Kerlix   [] Ace Wrap [] Cover Roll Tape [] ABD     [] Other:    Avoid contact of tape with skin.   [] Change dressing: [] Daily    [] Every Other Day [] Three times per week   [] Once a week [x] Do Not Change Dressing   [] Other:       Edema Control:  Apply: [] Compression Stocking []Right Leg []Left Leg   [] Tubigrip []Right Leg Double Layer []Left Leg Double Layer       []Right Leg Single Layer []Left Leg Single Layer   [] SpandaGrip []Right Leg  []Left Leg      []Low compression 5-10

## 2023-06-01 ENCOUNTER — HOSPITAL ENCOUNTER (OUTPATIENT)
Facility: HOSPITAL | Age: 63
Discharge: HOME | End: 2023-06-01
Payer: MEDICARE

## 2023-06-01 VITALS
DIASTOLIC BLOOD PRESSURE: 57 MMHG | TEMPERATURE: 98.9 F | RESPIRATION RATE: 16 BRPM | HEART RATE: 79 BPM | SYSTOLIC BLOOD PRESSURE: 102 MMHG

## 2023-06-01 DIAGNOSIS — L97.919 VENOUS ULCER OF RIGHT LEG (HCC): Primary | ICD-10-CM

## 2023-06-01 DIAGNOSIS — I89.0 LYMPHEDEMA: ICD-10-CM

## 2023-06-01 DIAGNOSIS — L97.929 VENOUS ULCER OF LEFT LEG (HCC): ICD-10-CM

## 2023-06-01 DIAGNOSIS — E66.01 MORBIDLY OBESE (HCC): ICD-10-CM

## 2023-06-01 DIAGNOSIS — I83.019 VENOUS ULCER OF RIGHT LEG (HCC): Primary | ICD-10-CM

## 2023-06-01 DIAGNOSIS — I83.029 VENOUS ULCER OF LEFT LEG (HCC): ICD-10-CM

## 2023-06-01 DIAGNOSIS — I87.2 CHRONIC VENOUS INSUFFICIENCY: ICD-10-CM

## 2023-06-01 PROCEDURE — 29581 APPL MULTLAYER CMPRN SYS LEG: CPT

## 2023-06-01 RX ORDER — BACITRACIN, NEOMYCIN, POLYMYXIN B 400; 3.5; 5 [USP'U]/G; MG/G; [USP'U]/G
OINTMENT TOPICAL ONCE
OUTPATIENT
Start: 2023-06-01 | End: 2023-06-01

## 2023-06-01 RX ORDER — GINSENG 100 MG
CAPSULE ORAL ONCE
OUTPATIENT
Start: 2023-06-01 | End: 2023-06-01

## 2023-06-01 RX ORDER — BACITRACIN ZINC AND POLYMYXIN B SULFATE 500; 1000 [USP'U]/G; [USP'U]/G
OINTMENT TOPICAL ONCE
OUTPATIENT
Start: 2023-06-01 | End: 2023-06-01

## 2023-06-01 RX ORDER — GENTAMICIN SULFATE 1 MG/G
OINTMENT TOPICAL ONCE
OUTPATIENT
Start: 2023-06-01 | End: 2023-06-01

## 2023-06-01 RX ORDER — BETAMETHASONE DIPROPIONATE 0.05 %
OINTMENT (GRAM) TOPICAL ONCE
OUTPATIENT
Start: 2023-06-01 | End: 2023-06-01

## 2023-06-01 RX ORDER — CLOBETASOL PROPIONATE 0.5 MG/G
OINTMENT TOPICAL ONCE
OUTPATIENT
Start: 2023-06-01 | End: 2023-06-01

## 2023-06-01 ASSESSMENT — PAIN DESCRIPTION - LOCATION: LOCATION: LEG

## 2023-06-01 ASSESSMENT — PAIN SCALES - GENERAL: PAINLEVEL_OUTOF10: 3

## 2023-06-01 ASSESSMENT — PAIN DESCRIPTION - ORIENTATION: ORIENTATION: RIGHT;LEFT

## 2023-06-01 ASSESSMENT — PAIN DESCRIPTION - DESCRIPTORS: DESCRIPTORS: SHARP

## 2023-06-01 NOTE — WOUND CARE
[id number]:     [address] [facility] [address] [facility], [address] [place] [address]  p: [phone] f: [phone]     Ordering Center:     [facility] OP WOUND CARE  [facility] [id number] [phone]  WOUND CARE Dept: [address] [phone]    Patient Information:      Neena Gowers  [address] [facility] [address] [address]   [phone]   : 1960  AGE: 66 y.o. GENDER: male   TODAYS DATE:  2023    Insurance:      PRIMARY INSURANCE:  Plan: [employer] COMPLETE  Coverage: [facility] MEDICARE  Effective Date: 2023  Group Number: [unfilled]  Subscriber Number: [id number] - (Medicare Managed)    Payer/Plan Subscr  Sex Relation Sub. Ins. ID Effective Group Num   1. [id number] 1960 Male Self [id number]                                   [address]   2. [employer] BETTER * ROSHNI ROGERS E 1960 Male Self [id number]                                    [address] [address]       Patient Information:      Problem List Items Addressed This Visit          Circulatory    Chronic venous insufficiency       Other    Morbidly obese ([address]    Venous ulcer of right leg ([address] - Primary    Venous ulcer of left leg ([address]    Lymphedema       Wound 23 Leg Right; Lower scattered open areas (Active)   Wound Image    23 1452   Wound Etiology Venous 23 1452   Dressing Status Intact 23 1452   Wound Cleansed Wound cleanser 23 1452   Dressing/Treatment Betadine swabs/povidone iodine;Alginate 23 1452   Offloading for Diabetic Foot Ulcers Offloading not required 23 1452   Dressing Change Due 23 1452   Wound Length (cm) 4.5 cm 23 1452   Wound Width (cm) 4.1 cm 23 1452   Wound Depth (cm) 0.1 cm 23   Wound Surface Area (cm^2) 18.45 cm^2 23   Change in Wound Size % (l*w) 64.52 23   Wound

## 2023-06-01 NOTE — DISCHARGE INSTRUCTIONS
Discharge Instructions from  [facility]  [address] [place], [place], [facility]  [phone] Fax [phone]    NAME:  Sue Mccormack OF BIRTH:  1960  MEDICAL RECORD NUMBER:  [id number]  DATE: 6/1/2023    Wound Cleansing:   Do not scrub or use excessive force. Cleanse wound prior to applying a clean dressing with:  [] Normal Saline [] Keep Wound Dry in Shower    [] Wound Cleanser   [] Cleanse wound with Mild Soap & Water  [] May Shower at Discharge   [] Other:      Topical Treatments:  Do not apply lotions, creams, or ointments to wound bed unless directed. [] Apply moisturizing lotion to skin surrounding the wound prior to dressing change.  [] Apply antifungal ointment to skin surrounding the wound prior to dressing change.  [] Apply thin film of moisture barrier ointment to skin immediately around wound. [] Other:       Dressings:           Wound Location Bilateral legs   [] Apply Primary Dressing:       [] MediHoney Gel [] Alginate with Silver [] Alginate   [] Collagen [] Collagen with Silver   [] Santyl with Moisten saline gauze     [] Hydrocolloid   [] MediHoney Alginate [] Foam with Silver   [] Foam   [] Hydrofera Blue    [] Mepilex Border    [] Moisten with Saline [] Hydrogel [] Mepitel     [] Bactroban/Mupirocin [] Polysporin  [] Other:    [] Pack wound loosely with  [] Iodoform   [] Plain Packing  [] Other   [] Cover and Secure with:     [] Gauze [] Rebecca [] Kerlix   [] Ace Wrap [] Cover Roll Tape [] ABD     [] Other:    Avoid contact of tape with skin.   [] Change dressing: [] Daily    [] Every Other Day [] Three times per week   [] Once a week [x] Do Not Change Dressing   [] Other:           Edema Control:  Apply: [] Compression Stocking []Right Leg []Left Leg   [] Tubigrip []Right Leg Double Layer []Left Leg Double Layer       []Right Leg Single Layer []Left Leg Single Layer   [] SpandaGrip []Right Leg  []Left Leg      []Low compression 5-10

## 2023-06-01 NOTE — FLOWSHEET NOTE
06/01/23 1452   Wound 03/02/23 Leg Right; Lower scattered open areas   Date First Assessed/Time First Assessed: 03/02/23 1613   Present on Hospital Admission: Yes  Wound Approximate Age at First Assessment (Weeks): 1 weeks  Primary Wound Type: Venous Ulcer  Location: Leg  Wound Location Orientation: Right; Lower  Wound De. .. Wound Image     Wound Etiology Venous   Dressing Status Intact   Wound Cleansed Wound cleanser   Dressing/Treatment Betadine swabs/povidone iodine;Alginate  (2 layer wrap)   Offloading for Diabetic Foot Ulcers Offloading not required   Dressing Change Due 06/08/23   Wound Length (cm) 4.5 cm   Wound Width (cm) 4.1 cm   Wound Depth (cm) 0.1 cm   Wound Surface Area (cm^2) 18.45 cm^2   Change in Wound Size % (l*w) 64.52   Wound Volume (cm^3) 1.845 cm^3   Wound Healing % 65   Wound Assessment Pink/red   Drainage Amount Moderate   Drainage Description Serous   Odor None   Arlen-wound Assessment Maceration   Margins Undefined edges   Wound Thickness Description not for Pressure Injury Partial thickness   Wound 03/02/23 Pretibial Left scattered open areas   Date First Assessed/Time First Assessed: 03/02/23 1614   Present on Hospital Admission: Yes  Wound Approximate Age at First Assessment (Weeks): 1 weeks  Primary Wound Type: Venous Ulcer  Location: Pretibial  Wound Location Orientation: Left  Wound Luis. ..    Wound Image    Wound Etiology Venous   Dressing Status Intact   Wound Cleansed Wound cleanser   Dressing/Treatment Dry dressing;Roll gauze  (2 layer wrap)   Offloading for Diabetic Foot Ulcers Offloading not required   Dressing Change Due 06/08/23   Wound Length (cm)   (small scattered open areas)   Wound Assessment Epithelialization;Pink/red   Drainage Amount Small   Drainage Description Serous   Odor None   Arlen-wound Assessment Dry/flaky   Margins Attached edges   Wound Thickness Description not for Pressure Injury Partial thickness       Multilayer Compression Wrap   (Not Unna) Below the

## 2023-06-08 ENCOUNTER — HOSPITAL ENCOUNTER (OUTPATIENT)
Facility: HOSPITAL | Age: 63
Discharge: HOME | End: 2023-06-08
Payer: MEDICARE

## 2023-06-08 VITALS
RESPIRATION RATE: 16 BRPM | TEMPERATURE: 99.1 F | DIASTOLIC BLOOD PRESSURE: 68 MMHG | SYSTOLIC BLOOD PRESSURE: 120 MMHG | HEART RATE: 84 BPM

## 2023-06-08 DIAGNOSIS — L97.919 VENOUS ULCER OF RIGHT LEG (HCC): ICD-10-CM

## 2023-06-08 DIAGNOSIS — I83.019 VENOUS ULCER OF RIGHT LEG (HCC): ICD-10-CM

## 2023-06-08 DIAGNOSIS — I89.0 LYMPHEDEMA: ICD-10-CM

## 2023-06-08 DIAGNOSIS — I83.029 VENOUS ULCER OF LEFT LEG (HCC): ICD-10-CM

## 2023-06-08 DIAGNOSIS — L97.929 VENOUS ULCER OF LEFT LEG (HCC): ICD-10-CM

## 2023-06-08 DIAGNOSIS — E66.01 MORBIDLY OBESE (HCC): Primary | ICD-10-CM

## 2023-06-08 DIAGNOSIS — I87.2 CHRONIC VENOUS INSUFFICIENCY: ICD-10-CM

## 2023-06-08 PROCEDURE — 29581 APPL MULTLAYER CMPRN SYS LEG: CPT

## 2023-06-08 RX ORDER — BACITRACIN ZINC AND POLYMYXIN B SULFATE 500; 1000 [USP'U]/G; [USP'U]/G
OINTMENT TOPICAL ONCE
OUTPATIENT
Start: 2023-06-08 | End: 2023-06-08

## 2023-06-08 RX ORDER — LIDOCAINE HYDROCHLORIDE 20 MG/ML
JELLY TOPICAL PRN
Status: DISCONTINUED | OUTPATIENT
Start: 2023-06-08 | End: 2023-06-09 | Stop reason: HOSPADM

## 2023-06-08 RX ORDER — GINSENG 100 MG
CAPSULE ORAL ONCE
OUTPATIENT
Start: 2023-06-08 | End: 2023-06-08

## 2023-06-08 RX ORDER — IBUPROFEN 200 MG
TABLET ORAL ONCE
OUTPATIENT
Start: 2023-06-08 | End: 2023-06-08

## 2023-06-08 RX ORDER — BETAMETHASONE DIPROPIONATE 0.05 %
OINTMENT (GRAM) TOPICAL ONCE
OUTPATIENT
Start: 2023-06-08 | End: 2023-06-08

## 2023-06-08 RX ORDER — GENTAMICIN SULFATE 1 MG/G
OINTMENT TOPICAL ONCE
OUTPATIENT
Start: 2023-06-08 | End: 2023-06-08

## 2023-06-08 RX ORDER — CLOBETASOL PROPIONATE 0.5 MG/G
OINTMENT TOPICAL ONCE
OUTPATIENT
Start: 2023-06-08 | End: 2023-06-08

## 2023-06-08 NOTE — FLOWSHEET NOTE
06/08/23 1513   Anesthetic   Anesthetic 2% Lidocaine Gel Topical   Wound 03/02/23 Leg Right; Lower scattered open areas   Date First Assessed/Time First Assessed: 03/02/23 1613   Present on Hospital Admission: Yes  Wound Approximate Age at First Assessment (Weeks): 1 weeks  Primary Wound Type: Venous Ulcer  Location: Leg  Wound Location Orientation: Right; Lower  Wound De. .. Wound Image    Wound Etiology Venous   Dressing Status Intact   Wound Cleansed Wound cleanser   Dressing/Treatment Alginate;Roll gauze; Other (comment)  (2 layer compression wrap.)   Offloading for Diabetic Foot Ulcers Offloading not required   Dressing Change Due 06/15/23   Wound Length (cm) 3 cm   Wound Width (cm) 3.1 cm   Wound Depth (cm) 0.1 cm   Wound Surface Area (cm^2) 9.3 cm^2   Change in Wound Size % (l*w) 82.12   Wound Volume (cm^3) 0.93 cm^3   Wound Healing % 82   Wound Assessment Pink/red   Drainage Amount Small   Drainage Description Serous   Odor None   Arlen-wound Assessment Denuded   Margins Undefined edges   Wound Thickness Description not for Pressure Injury Partial thickness   Wound 03/02/23 Pretibial Left scattered open areas   Date First Assessed/Time First Assessed: 03/02/23 1614   Present on Hospital Admission: Yes  Wound Approximate Age at First Assessment (Weeks): 1 weeks  Primary Wound Type: Venous Ulcer  Location: Pretibial  Wound Location Orientation: Left  Wound Luis. ..    Wound Cleansed Wound cleanser   Wound Length (cm)   (clinically closed, applied Medical  Compression sock)   Wound Assessment Dry   Drainage Amount None   Odor None   Arlen-wound Assessment Dry/flaky   Margins Attached edges

## 2023-06-08 NOTE — WOUND CARE
Multilayer Compression Wrap   (Not Unna) Below the Knee    NAME:  Paul Cornea OF BIRTH:  1960  MEDICAL RECORD NUMBER:  [id number]  DATE:  6/8/2023    Multilayer compression wrap: Removed old Multilayer wrap if indicated and wash leg with mild soap/water. Applied moisturizing agent to dry skin as needed. Applied primary and secondary dressing as ordered. Applied multilayered dressing below the knee to right lower leg. Instructed patient/caregiver not to remove dressing and to keep it clean and dry. Instructed patient/caregiver on complications to report to provider, such as pain, numbness in toes, heavy drainage, and slippage of dressing. Instructed patient on purpose of compression dressing and on activity and exercise recommendations.       Electronically signed by Marilyn Zelaya LPN on 8/6/8161 at 8:81 PM
this visit. New lab or imaging orders placed:  No.     Written patient dismissal instructions given to patient and signed by patient or POA. Patient voiced understanding that the importance of adherence to instructions is paramount to wound healing improvement or success. FU as weekly.     Electronically signed by Naomi Smart MD on 6/8/2023

## 2023-06-08 NOTE — DISCHARGE INSTRUCTIONS
tolerated:  [] Patient has no activity restrictions     [] Strict Bedrest: [] Remain off Work:     [] May return to full duty work:                                   [] Return to work with restrictions:             Return Appointment:  [x] Wound and dressing supply provider:   [] ECF or Home Healthcare:  [x] Wound Assessment: [x] Physician or NP scheduled for Wound Assessment:   [x] Return Appointment: 1 Week(s)  [] Ordered tests:      Electronically signed Rayne Noel LPN on 7/6/0497 at 1:12 PM     Eve Del[address]: Should you experience any significant changes in your wound(s) or have questions about your wound care, please contact the [facility] at [address] [place] [address] 8:00 am - 4:30. If you need help with your wound outside these hours and cannot wait until we are again available, contact your PCP or go to the hospital emergency room. PLEASE NOTE: IF YOU ARE UNABLE TO OBTAIN WOUND SUPPLIES, CONTINUE TO USE THE SUPPLIES YOU HAVE AVAILABLE UNTIL YOU ARE ABLE TO REACH US. IT IS MOST IMPORTANT TO KEEP THE WOUND COVERED AT ALL TIMES.      Physician Signature:_______________________    Date: ___________ Time:  ____________

## 2023-06-22 ENCOUNTER — HOSPITAL ENCOUNTER (OUTPATIENT)
Facility: HOSPITAL | Age: 63
Discharge: HOME | End: 2023-06-22
Payer: MEDICARE

## 2023-06-22 VITALS
SYSTOLIC BLOOD PRESSURE: 107 MMHG | TEMPERATURE: 98.7 F | OXYGEN SATURATION: 97 % | RESPIRATION RATE: 18 BRPM | DIASTOLIC BLOOD PRESSURE: 67 MMHG | HEART RATE: 80 BPM

## 2023-06-22 DIAGNOSIS — I83.029 VENOUS ULCER OF LEFT LEG (HCC): ICD-10-CM

## 2023-06-22 DIAGNOSIS — I87.2 CHRONIC VENOUS INSUFFICIENCY: ICD-10-CM

## 2023-06-22 DIAGNOSIS — L97.919 VENOUS ULCER OF RIGHT LEG (HCC): ICD-10-CM

## 2023-06-22 DIAGNOSIS — I83.019 VENOUS ULCER OF RIGHT LEG (HCC): ICD-10-CM

## 2023-06-22 DIAGNOSIS — I89.0 LYMPHEDEMA: ICD-10-CM

## 2023-06-22 DIAGNOSIS — L97.929 VENOUS ULCER OF LEFT LEG (HCC): ICD-10-CM

## 2023-06-22 DIAGNOSIS — E66.01 MORBIDLY OBESE (HCC): Primary | ICD-10-CM

## 2023-06-22 PROCEDURE — 99213 OFFICE O/P EST LOW 20 MIN: CPT

## 2023-06-22 RX ORDER — BETAMETHASONE DIPROPIONATE 0.05 %
OINTMENT (GRAM) TOPICAL ONCE
OUTPATIENT
Start: 2023-06-22 | End: 2023-06-22

## 2023-06-22 RX ORDER — GENTAMICIN SULFATE 1 MG/G
OINTMENT TOPICAL ONCE
OUTPATIENT
Start: 2023-06-22 | End: 2023-06-22

## 2023-06-22 RX ORDER — BACITRACIN ZINC AND POLYMYXIN B SULFATE 500; 1000 [USP'U]/G; [USP'U]/G
OINTMENT TOPICAL ONCE
OUTPATIENT
Start: 2023-06-22 | End: 2023-06-22

## 2023-06-22 RX ORDER — GINSENG 100 MG
CAPSULE ORAL ONCE
OUTPATIENT
Start: 2023-06-22 | End: 2023-06-22

## 2023-06-22 RX ORDER — CLOBETASOL PROPIONATE 0.5 MG/G
OINTMENT TOPICAL ONCE
OUTPATIENT
Start: 2023-06-22 | End: 2023-06-22

## 2023-06-22 RX ORDER — IBUPROFEN 200 MG
TABLET ORAL ONCE
OUTPATIENT
Start: 2023-06-22 | End: 2023-06-22

## 2023-06-22 NOTE — WOUND CARE
[facility]   Progress Note   Referring Provider: MD Nadia- PCP  Reason for Referral: recurrent LE wound    Sandra Dias  MEDICAL RECORD NUMBER:  [id number]  AGE: 66 y.o. GENDER: male  : 1960  EPISODE DATE:  2023    Chief complaint and reason for visit:     BL leg wounds--recurrent- currently R leg only     HPI on initial evaluation 2023:      Sandra Dias is a 66 y.o. male who presents today for an initial evaluation of a wound/ulcer. Patient is returning to the wound center for a recurrent or new wound. He was last seen here in 2022, at which time his lower extremity wound healed and he was discharged. He has lymphedema pump and uses it regularly. Wound duration:  4 weeks or more on initial visit. Recurrence appeared 1 week after 3/23/23- last visit    History of Wound Context: venous insuffiiciency and diabetes wound  Pertinent associated symptoms: swelling    Contributing factors:   Diabetes: yes-- HB A1C--N/A  Vascular: venous insufficiency and lymphedema  Smoking: No     Today's Visit 2023 :  Here alone for FU. Leg swelling and wound improving with use of wrapping.   Almost all open wound dried up    Review of Systems    Denies fever or chills or rigors  Denies  nausea or vomiting or abd pain or diarrhea  Denies chest pain, productive cough  Denies new joint pian or swelling  Denies dysuria/Flank pain/ hematuria      Past Medical History:   Diagnosis Date    Chronic venous insufficiency     Diabetes (HCC)     Hypertension     Morbid obesity ([address]         ALLERGIES  No Known Allergies    MEDICATIONS  Current Outpatient Medications on File Prior to Encounter   Medication Sig Dispense Refill    SITagliptin-metFORMIN HCl ER (JANUMET XR) 100-1000 MG TB24 Take by mouth      empagliflozin (JARDIANCE) 10 MG tablet Take 1 tablet by mouth daily      dulaglutide (TRULICITY) 1.5 WT/1.5GC SC injection Inject 0.5 mLs into the skin once a week

## 2023-06-22 NOTE — FLOWSHEET NOTE
06/22/23 1514   Wound 03/02/23 Leg Right; Lower scattered open areas   Date First Assessed/Time First Assessed: 03/02/23 1613   Present on Hospital Admission: Yes  Wound Approximate Age at First Assessment (Weeks): 1 weeks  Primary Wound Type: Venous Ulcer  Location: Leg  Wound Location Orientation: Right; Lower  Wound De. ..    Wound Image    Wound Etiology Venous   Dressing Status Intact   Wound Cleansed Wound cleanser   Dressing/Treatment Alginate with Ag;Roll gauze   Offloading for Diabetic Foot Ulcers Offloading not required   Dressing Change Due 07/13/23   Wound Length (cm)   (Wounds clinically closed)   Wound Assessment Pink/red   Odor None   Arlen-wound Assessment Dry/flaky   Diogenes Scale   Sensory Perceptions 4   Moisture 4   Activity 4   Mobility 3   Nutrition 4   Friction and Shear 3   Diogenes Scale Score 22

## 2023-08-10 ENCOUNTER — HOSPITAL ENCOUNTER (OUTPATIENT)
Facility: HOSPITAL | Age: 63
Discharge: HOME | End: 2023-08-10
Payer: MEDICARE

## 2023-08-10 VITALS
TEMPERATURE: 97.8 F | RESPIRATION RATE: 16 BRPM | SYSTOLIC BLOOD PRESSURE: 96 MMHG | HEART RATE: 80 BPM | DIASTOLIC BLOOD PRESSURE: 60 MMHG

## 2023-08-10 DIAGNOSIS — I89.0 LYMPHEDEMA: ICD-10-CM

## 2023-08-10 DIAGNOSIS — I83.019 VENOUS ULCER OF RIGHT LEG (HCC): Primary | ICD-10-CM

## 2023-08-10 DIAGNOSIS — I87.2 CHRONIC VENOUS INSUFFICIENCY: ICD-10-CM

## 2023-08-10 DIAGNOSIS — E66.01 MORBIDLY OBESE (HCC): ICD-10-CM

## 2023-08-10 DIAGNOSIS — L97.929 VENOUS ULCER OF LEFT LEG (HCC): ICD-10-CM

## 2023-08-10 DIAGNOSIS — I83.029 VENOUS ULCER OF LEFT LEG (HCC): ICD-10-CM

## 2023-08-10 DIAGNOSIS — L97.919 VENOUS ULCER OF RIGHT LEG (HCC): Primary | ICD-10-CM

## 2023-08-10 PROCEDURE — 29581 APPL MULTLAYER CMPRN SYS LEG: CPT

## 2023-08-10 RX ORDER — BACITRACIN ZINC AND POLYMYXIN B SULFATE 500; 1000 [USP'U]/G; [USP'U]/G
OINTMENT TOPICAL ONCE
OUTPATIENT
Start: 2023-08-10 | End: 2023-08-10

## 2023-08-10 RX ORDER — GINSENG 100 MG
CAPSULE ORAL ONCE
OUTPATIENT
Start: 2023-08-10 | End: 2023-08-10

## 2023-08-10 RX ORDER — CLOBETASOL PROPIONATE 0.5 MG/G
OINTMENT TOPICAL ONCE
OUTPATIENT
Start: 2023-08-10 | End: 2023-08-10

## 2023-08-10 RX ORDER — BETAMETHASONE DIPROPIONATE 0.05 %
OINTMENT (GRAM) TOPICAL ONCE
OUTPATIENT
Start: 2023-08-10 | End: 2023-08-10

## 2023-08-10 RX ORDER — IBUPROFEN 200 MG
TABLET ORAL ONCE
OUTPATIENT
Start: 2023-08-10 | End: 2023-08-10

## 2023-08-10 RX ORDER — GENTAMICIN SULFATE 1 MG/G
OINTMENT TOPICAL ONCE
OUTPATIENT
Start: 2023-08-10 | End: 2023-08-10

## 2023-08-10 ASSESSMENT — PAIN DESCRIPTION - ORIENTATION: ORIENTATION: LEFT

## 2023-08-10 ASSESSMENT — PAIN DESCRIPTION - LOCATION: LOCATION: LEG

## 2023-08-10 ASSESSMENT — PAIN DESCRIPTION - DESCRIPTORS: DESCRIPTORS: BURNING

## 2023-08-10 ASSESSMENT — PAIN SCALES - GENERAL: PAINLEVEL_OUTOF10: 3

## 2023-08-10 NOTE — FLOWSHEET NOTE
08/10/23 1317   Wound 03/02/23 Leg Right; Lower scattered open areas   Date First Assessed/Time First Assessed: 03/02/23 1613   Present on Hospital Admission: Yes  Wound Approximate Age at First Assessment (Weeks): 1 weeks  Primary Wound Type: Venous Ulcer  Location: Leg  Wound Location Orientation: Right; Lower  Wound De. .. Wound Image     Wound Etiology Venous   Dressing Status New dressing applied   Wound Cleansed Wound cleanser   Dressing/Treatment Xeroform;Roll gauze  (2 layer wrap)   Offloading for Diabetic Foot Ulcers Offloading not required   Dressing Change Due 08/17/23   Wound Length (cm)   (small scattered open areas)   Wound Assessment Pink/red   Drainage Amount Small (< 25%)   Drainage Description Serous   Odor None   Arlen-wound Assessment Dry/flaky   Margins Attached edges   Wound Thickness Description not for Pressure Injury Partial thickness   Wound 03/02/23 Pretibial Left scattered open areas   Date First Assessed/Time First Assessed: 03/02/23 1614   Present on Hospital Admission: Yes  Wound Approximate Age at First Assessment (Weeks): 1 weeks  Primary Wound Type: Venous Ulcer  Location: Pretibial  Wound Location Orientation: Left  Wound Luis. ..    Wound Image    Wound Etiology Venous   Dressing Status New dressing applied   Wound Cleansed Wound cleanser   Dressing/Treatment Xeroform;Roll gauze  (2 layer wrap)   Offloading for Diabetic Foot Ulcers Offloading not required   Dressing Change Due 08/17/23   Wound Length (cm)   (multiple small open areas)   Wound Assessment Pink/red   Drainage Amount Moderate (25-50%)   Drainage Description Serous   Odor None   Arlen-wound Assessment Dry/flaky   Margins Attached edges   Wound Thickness Description not for Pressure Injury Partial thickness     Multilayer Compression Wrap   (Not Unna) Below the Knee    NAME:  Melina Ganser  YOB: 1960  MEDICAL RECORD NUMBER:  [id number]  DATE:  8/10/2023    Multilayer compression wrap: Applied moisturizing

## 2023-08-17 ENCOUNTER — HOSPITAL ENCOUNTER (OUTPATIENT)
Facility: HOSPITAL | Age: 63
Discharge: HOME | End: 2023-08-17
Payer: MEDICARE

## 2023-08-17 VITALS
RESPIRATION RATE: 16 BRPM | SYSTOLIC BLOOD PRESSURE: 110 MMHG | HEART RATE: 79 BPM | TEMPERATURE: 98.4 F | DIASTOLIC BLOOD PRESSURE: 73 MMHG

## 2023-08-17 DIAGNOSIS — L97.919 VENOUS ULCER OF RIGHT LEG (HCC): ICD-10-CM

## 2023-08-17 DIAGNOSIS — I83.019 VENOUS ULCER OF RIGHT LEG (HCC): ICD-10-CM

## 2023-08-17 DIAGNOSIS — I89.0 LYMPHEDEMA: ICD-10-CM

## 2023-08-17 DIAGNOSIS — E66.01 MORBIDLY OBESE (HCC): Primary | ICD-10-CM

## 2023-08-17 DIAGNOSIS — I87.2 CHRONIC VENOUS INSUFFICIENCY: ICD-10-CM

## 2023-08-17 DIAGNOSIS — I83.029 VENOUS ULCER OF LEFT LEG (HCC): ICD-10-CM

## 2023-08-17 DIAGNOSIS — L97.929 VENOUS ULCER OF LEFT LEG (HCC): ICD-10-CM

## 2023-08-17 PROCEDURE — 29581 APPL MULTLAYER CMPRN SYS LEG: CPT

## 2023-08-17 RX ORDER — CLOBETASOL PROPIONATE 0.5 MG/G
OINTMENT TOPICAL ONCE
OUTPATIENT
Start: 2023-08-17 | End: 2023-08-17

## 2023-08-17 RX ORDER — BETAMETHASONE DIPROPIONATE 0.05 %
OINTMENT (GRAM) TOPICAL ONCE
OUTPATIENT
Start: 2023-08-17 | End: 2023-08-17

## 2023-08-17 RX ORDER — GINSENG 100 MG
CAPSULE ORAL ONCE
OUTPATIENT
Start: 2023-08-17 | End: 2023-08-17

## 2023-08-17 RX ORDER — GENTAMICIN SULFATE 1 MG/G
OINTMENT TOPICAL ONCE
OUTPATIENT
Start: 2023-08-17 | End: 2023-08-17

## 2023-08-17 RX ORDER — IBUPROFEN 200 MG
TABLET ORAL ONCE
OUTPATIENT
Start: 2023-08-17 | End: 2023-08-17

## 2023-08-17 RX ORDER — BACITRACIN ZINC AND POLYMYXIN B SULFATE 500; 1000 [USP'U]/G; [USP'U]/G
OINTMENT TOPICAL ONCE
OUTPATIENT
Start: 2023-08-17 | End: 2023-08-17

## 2023-08-17 ASSESSMENT — PAIN DESCRIPTION - ORIENTATION: ORIENTATION: LEFT

## 2023-08-17 ASSESSMENT — PAIN SCALES - GENERAL: PAINLEVEL_OUTOF10: 3

## 2023-08-17 ASSESSMENT — PAIN DESCRIPTION - LOCATION: LOCATION: LEG

## 2023-08-17 NOTE — DISCHARGE INSTRUCTIONS
Compression Wraps Discharge Instructions   Location: bilateral Legs  Type: multi layer wraps    Your doctor has ordered compression therapy for your wound. Compression bandages reduce the swelling, or edema, in your legs and prevent it from returning. The wound care staff will apply your compression wrap. It must be removed and re-applied at least weekly. As the swelling decreases, the boot no longer provides adequate compression and you need a new one. Once applied, you need to know how to take care of your compression wrap. The boot must stay dry. Do not get it wet in the shower or tub. You may do a partial bath, or you can cover the boot with a large plastic bag, secured at the top, so that no water can get in. Avoid standing in one place for long periods of time. If you must  one place, shift your weight and change positions often. If you have CHF, consult your doctor before following the next two recommendations for leg elevation. When sitting, elevate your legs on pillows, or put blocks under the foot of your bed. Your legs should be higher than your heart. If your boot becomes painful, or you notice an increase in swelling in your toes, numbness or tingling, or purple color to your toes, remove the wrap and call the [address] [place] [address]. If it is after hours, call your doctor for instructions or go to the nearest emergency room.

## 2023-08-17 NOTE — FLOWSHEET NOTE
08/17/23 1106   Wound 03/02/23 Leg Right; Lower scattered open areas   Date First Assessed/Time First Assessed: 03/02/23 1613   Present on Hospital Admission: Yes  Wound Approximate Age at First Assessment (Weeks): 1 weeks  Primary Wound Type: Venous Ulcer  Location: Leg  Wound Location Orientation: Right; Lower  Wound De. .. Wound Image     Wound Etiology Venous   Dressing Status Intact   Wound Cleansed Wound cleanser   Dressing/Treatment Xeroform;Roll gauze  (2 layer wrap)   Offloading for Diabetic Foot Ulcers Offloading not required   Dressing Change Due 08/24/23   Wound Length (cm)   (small scattered areas)   Wound Assessment Pink/red   Drainage Amount Small (< 25%)   Drainage Description Serous   Odor None   Arlen-wound Assessment Dry/flaky   Margins Attached edges   Wound Thickness Description not for Pressure Injury Partial thickness   Wound 03/02/23 Pretibial Left scattered open areas   Date First Assessed/Time First Assessed: 03/02/23 1614   Present on Hospital Admission: Yes  Wound Approximate Age at First Assessment (Weeks): 1 weeks  Primary Wound Type: Venous Ulcer  Location: Pretibial  Wound Location Orientation: Left  Wound Luis. ..    Wound Image    Wound Etiology Venous   Dressing Status Intact   Wound Cleansed Wound cleanser   Dressing/Treatment Xeroform;Roll gauze   Offloading for Diabetic Foot Ulcers Offloading not required   Dressing Change Due 08/24/23   Wound Length (cm)   (small scattered open areas)   Wound Assessment Pink/red   Drainage Amount Small (< 25%)   Drainage Description Serous   Odor None   Arlen-wound Assessment Dry/flaky   Margins Attached edges   Wound Thickness Description not for Pressure Injury Partial thickness       Multilayer Compression Wrap   (Not Unna) Below the Knee    NAME:  Keiry Greene OF BIRTH:  1960  MEDICAL RECORD NUMBER:  [id number]  DATE:  8/17/2023    Multilayer compression wrap: Removed old Multilayer wrap if indicated and wash leg with mild

## 2023-08-24 ENCOUNTER — HOSPITAL ENCOUNTER (OUTPATIENT)
Facility: HOSPITAL | Age: 63
Discharge: HOME | End: 2023-08-24
Payer: MEDICARE

## 2023-08-24 VITALS
HEART RATE: 80 BPM | RESPIRATION RATE: 16 BRPM | TEMPERATURE: 98 F | SYSTOLIC BLOOD PRESSURE: 110 MMHG | DIASTOLIC BLOOD PRESSURE: 65 MMHG

## 2023-08-24 PROCEDURE — 29581 APPL MULTLAYER CMPRN SYS LEG: CPT

## 2023-08-24 ASSESSMENT — PAIN DESCRIPTION - ORIENTATION: ORIENTATION: RIGHT;LEFT

## 2023-08-24 ASSESSMENT — PAIN DESCRIPTION - LOCATION: LOCATION: LEG

## 2023-08-24 ASSESSMENT — PAIN SCALES - GENERAL: PAINLEVEL_OUTOF10: 3

## 2023-08-24 ASSESSMENT — PAIN DESCRIPTION - DESCRIPTORS: DESCRIPTORS: DISCOMFORT

## 2023-08-24 NOTE — DISCHARGE INSTRUCTIONS
Compression Wraps Discharge Instructions   Location: Bilateral Legs  Type: 2 layer wrap    Your doctor has ordered compression therapy for your wound. Compression bandages reduce the swelling, or edema, in your legs and prevent it from returning. The wound care staff will apply your compression wrap. It must be removed and re-applied at least weekly. As the swelling decreases, the boot no longer provides adequate compression and you need a new one. Once applied, you need to know how to take care of your compression wrap. The boot must stay dry. Do not get it wet in the shower or tub. You may do a partial bath, or you can cover the boot with a large plastic bag, secured at the top, so that no water can get in. Avoid standing in one place for long periods of time. If you must  one place, shift your weight and change positions often. If you have CHF, consult your doctor before following the next two recommendations for leg elevation. When sitting, elevate your legs on pillows, or put blocks under the foot of your bed. Your legs should be higher than your heart. If your boot becomes painful, or you notice an increase in swelling in your toes, numbness or tingling, or purple color to your toes, remove the wrap and call the [address] [place] [address]. If it is after hours, call your doctor for instructions or go to the nearest emergency room.

## 2023-08-24 NOTE — WOUND CARE
Description Serous 08/24/23 1549   Odor None 08/24/23 1549   Arlen-wound Assessment Dry/flaky 08/24/23 1549   Margins Attached edges 08/24/23 1549   Wound Thickness Description not for Pressure Injury Partial thickness 08/24/23 1549   Number of days: 175            TIME: E/M Time spent with patient and patient care issues: [] 15-20 min  [] 21-30 min  [x] 31-44 min  [] 45 min or more. This time also includes physician non-face-to-face service time visit on the date of service such as  Preparing to see the patient (eg, review of tests)  Obtaining and/or reviewing separately obtained history  Performing a medically necessary appropriate examination and/or evaluation  Counseling and educating the patient/family/caregiver  Ordering medications, tests, or procedures  Referring and communicating with other health care professionals as needed  Documenting clinical information in the electronic or other health record  Independently interpreting results (not reported separately) and communicating results to the patient/family/caregiver  Care coordination (not reported separately)                Discharge Instructions         Compression Wraps Discharge Instructions   Location: Bilateral Legs  Type: 2 layer wrap    Your doctor has ordered compression therapy for your wound. Compression bandages reduce the swelling, or edema, in your legs and prevent it from returning. The wound care staff will apply your compression wrap. It must be removed and re-applied at least weekly. As the swelling decreases, the boot no longer provides adequate compression and you need a new one. Once applied, you need to know how to take care of your compression wrap. The boot must stay dry. Do not get it wet in the shower or tub. You may do a partial bath, or you can cover the boot with a large plastic bag, secured at the top, so that no water can get in. Avoid standing in one place for long periods of time.  If you must  one place,

## 2023-08-24 NOTE — FLOWSHEET NOTE
08/24/23 1549   Wound 03/02/23 Leg Right; Lower scattered open areas   Date First Assessed/Time First Assessed: 03/02/23 1613   Present on Hospital Admission: Yes  Wound Approximate Age at First Assessment (Weeks): 1 weeks  Primary Wound Type: Venous Ulcer  Location: Leg  Wound Location Orientation: Right; Lower  Wound De. .. Wound Image     Wound Etiology Venous   Dressing Status Intact   Wound Cleansed Wound cleanser   Dressing/Treatment Xeroform;Roll gauze  (2 layer wrap)   Offloading for Diabetic Foot Ulcers Offloading not required   Dressing Change Due 08/31/23   Wound Length (cm)   (small scattered aeas)   Wound Assessment Pink/red   Drainage Amount Small (< 25%)   Drainage Description Serous   Odor None   Arlen-wound Assessment Dry/flaky   Margins Attached edges   Wound Thickness Description not for Pressure Injury Partial thickness   Wound 03/02/23 Pretibial Left scattered open areas   Date First Assessed/Time First Assessed: 03/02/23 1614   Present on Hospital Admission: Yes  Wound Approximate Age at First Assessment (Weeks): 1 weeks  Primary Wound Type: Venous Ulcer  Location: Pretibial  Wound Location Orientation: Left  Wound Luis. ..    Wound Image     Wound Etiology Venous   Dressing Status Intact   Wound Cleansed Wound cleanser   Dressing/Treatment Dry dressing;Roll gauze  (2 layer wrap)   Offloading for Diabetic Foot Ulcers Offloading not required   Dressing Change Due 08/31/23   Wound Length (cm)   (small scattered areas)   Wound Assessment Pink/red   Drainage Amount Scant (moist but unmeasurable)   Drainage Description Serous   Odor None   Arlen-wound Assessment Dry/flaky   Margins Attached edges   Wound Thickness Description not for Pressure Injury Partial thickness     Multilayer Compression Wrap   (Not Unna) Below the Knee    NAME:  Ashly Arvizu  YOB: 1960  MEDICAL RECORD NUMBER:  [id number]  DATE:  8/24/2023    Multilayer compression wrap: Removed old Multilayer wrap if indicated

## 2023-08-31 ENCOUNTER — HOSPITAL ENCOUNTER (OUTPATIENT)
Facility: HOSPITAL | Age: 63
Discharge: HOME | End: 2023-08-31
Payer: MEDICARE

## 2023-08-31 DIAGNOSIS — L97.929 VENOUS ULCER OF LEFT LEG (HCC): ICD-10-CM

## 2023-08-31 DIAGNOSIS — I87.2 CHRONIC VENOUS INSUFFICIENCY: ICD-10-CM

## 2023-08-31 DIAGNOSIS — L97.919 VENOUS ULCER OF RIGHT LEG (HCC): ICD-10-CM

## 2023-08-31 DIAGNOSIS — I83.029 VENOUS ULCER OF LEFT LEG (HCC): ICD-10-CM

## 2023-08-31 DIAGNOSIS — I89.0 LYMPHEDEMA: ICD-10-CM

## 2023-08-31 DIAGNOSIS — E66.01 MORBIDLY OBESE (HCC): Primary | ICD-10-CM

## 2023-08-31 DIAGNOSIS — I83.019 VENOUS ULCER OF RIGHT LEG (HCC): ICD-10-CM

## 2023-08-31 PROCEDURE — 99212 OFFICE O/P EST SF 10 MIN: CPT

## 2023-08-31 RX ORDER — IBUPROFEN 200 MG
TABLET ORAL ONCE
OUTPATIENT
Start: 2023-08-31 | End: 2023-08-31

## 2023-08-31 RX ORDER — CLOBETASOL PROPIONATE 0.5 MG/G
OINTMENT TOPICAL ONCE
OUTPATIENT
Start: 2023-08-31 | End: 2023-08-31

## 2023-08-31 RX ORDER — BETAMETHASONE DIPROPIONATE 0.05 %
OINTMENT (GRAM) TOPICAL ONCE
OUTPATIENT
Start: 2023-08-31 | End: 2023-08-31

## 2023-08-31 RX ORDER — BACITRACIN ZINC AND POLYMYXIN B SULFATE 500; 1000 [USP'U]/G; [USP'U]/G
OINTMENT TOPICAL ONCE
OUTPATIENT
Start: 2023-08-31 | End: 2023-08-31

## 2023-08-31 RX ORDER — GENTAMICIN SULFATE 1 MG/G
OINTMENT TOPICAL ONCE
OUTPATIENT
Start: 2023-08-31 | End: 2023-08-31

## 2023-08-31 RX ORDER — GINSENG 100 MG
CAPSULE ORAL ONCE
OUTPATIENT
Start: 2023-08-31 | End: 2023-08-31

## 2023-08-31 NOTE — FLOWSHEET NOTE
08/31/23 1623   Wound 03/02/23 Leg Right; Lower scattered open areas   Date First Assessed/Time First Assessed: 03/02/23 1613   Present on Hospital Admission: Yes  Wound Approximate Age at First Assessment (Weeks): 1 weeks  Primary Wound Type: Venous Ulcer  Location: Leg  Wound Location Orientation: Right; Lower  Wound De. .. Wound Image    Wound Etiology Venous   Dressing Status Intact   Wound Cleansed Wound cleanser   Dressing/Treatment   (.Farrow wraps applied)   Offloading for Diabetic Foot Ulcers Offloading not required   Dressing Change Due   (discharged from wound clinic)   Wound Length (cm)   (leg wounds are clinically closed)   Wound 03/02/23 Pretibial Left scattered open areas   Date First Assessed/Time First Assessed: 03/02/23 1614   Present on Hospital Admission: Yes  Wound Approximate Age at First Assessment (Weeks): 1 weeks  Primary Wound Type: Venous Ulcer  Location: Pretibial  Wound Location Orientation: Left  Wound Luis. .. Wound Image    Wound Etiology Venous   Dressing Status Intact   Wound Cleansed Wound cleanser   Dressing/Treatment   (farrow wraps applied)   Offloading for Diabetic Foot Ulcers Offloading not required   Dressing Change Due   (discharged from clinic)   Wound Length (cm)   (Leg wounds clinically closed)     Victor M Larson wraps applied;  Discharged from wound clinic

## 2023-08-31 NOTE — DISCHARGE INSTRUCTIONS
Wound Care Discharge Instructions  Katherin  [facility] [address], [address] [facility]                                   Telephone: [phone]    NAME:  Mildred Araya OF BIRTH:  1960  MEDICAL RECORD NUMBER:  [id number]  DATE:  8/31/2023    Congratulations! You have completed your treatment. Return to your Primary Care Physician for all your health issues. Resume your ordinary activities as tolerated. Take your medications as prescribed by your primary care physician. Check your skin daily for cracks, bruises, sores, or dryness. Use a moisturizer as needed. Clean and dry your skin, using mild soap and warm water (not hot). Avoid alcohol and caffeine and do not smoke. Maintain a nutritious diet. Avoid pressure on your wound site. Keep your legs elevated above the level of the heart whenever possible. Continue to use wraps/stockings/compression as prescribed. Replace compression stockings every four to six months as needed to ensure proper fit. Wear well-fitting shoes and leg garments. THANK YOU FOR ALLOWING US TO SERVE YOU.   PLEASE CALL IF YOU DEVELOP ANOTHER WOUND. ([phone])     Electronically signed by Khris Otero RN on 8/31/2023 at 4:29 PM

## 2023-09-01 NOTE — WOUND CARE
Xeroform;Roll gauze 08/24/23 1549   Offloading for Diabetic Foot Ulcers Offloading not required 08/31/23 1623   Dressing Change Due 08/31/23 08/24/23 1549   Wound Length (cm) 0.3 cm 06/15/23 0941   Wound Width (cm) 0.3 cm 06/15/23 0941   Wound Depth (cm) 0.1 cm 06/15/23 0941   Wound Surface Area (cm^2) 0.09 cm^2 06/15/23 0941   Change in Wound Size % (l*w) 99.83 06/15/23 0941   Wound Volume (cm^3) 0.009 cm^3 06/15/23 0941   Wound Healing % 100 06/15/23 0941   Wound Assessment Pink/red 08/24/23 1549   Drainage Amount Small (< 25%) 08/24/23 1549   Drainage Description Serous 08/24/23 1549   Odor None 08/24/23 1549   Arlen-wound Assessment Dry/flaky 08/24/23 1549   Margins Attached edges 08/24/23 1549   Wound Thickness Description not for Pressure Injury Partial thickness 08/24/23 1549   Number of days: 182       Wound 03/02/23 Pretibial Left scattered open areas (Active)   Wound Image   08/31/23 1623   Wound Etiology Venous 08/31/23 1623   Dressing Status Intact 08/31/23 1623   Wound Cleansed Wound cleanser 08/31/23 1623   Dressing/Treatment Dry dressing;Roll gauze 08/24/23 1549   Offloading for Diabetic Foot Ulcers Offloading not required 08/31/23 1623   Dressing Change Due 08/31/23 08/24/23 1549   Wound Assessment Pink/red 08/24/23 1549   Drainage Amount Scant (moist but unmeasurable) 08/24/23 1549   Drainage Description Serous 08/24/23 1549   Odor None 08/24/23 1549   Arlen-wound Assessment Dry/flaky 08/24/23 1549   Margins Attached edges 08/24/23 1549   Wound Thickness Description not for Pressure Injury Partial thickness 08/24/23 1549   Number of days: 182            TIME: E/M Time spent with patient and patient care issues: [] 15-20 min  [x] 21-30 min  [] 31-44 min  [] 45 min or more.      This time also includes physician non-face-to-face service time visit on the date of service such as  Preparing to see the patient (eg, review of tests)  Obtaining and/or reviewing separately obtained history  Performing a

## 2024-02-29 ENCOUNTER — HOSPITAL ENCOUNTER (OUTPATIENT)
Facility: HOSPITAL | Age: 64
Discharge: HOME | End: 2024-02-29
Attending: HOSPITALIST

## 2024-02-29 VITALS
OXYGEN SATURATION: 96 % | RESPIRATION RATE: 18 BRPM | SYSTOLIC BLOOD PRESSURE: 97 MMHG | TEMPERATURE: 98.7 F | HEART RATE: 81 BPM | DIASTOLIC BLOOD PRESSURE: 57 MMHG

## 2024-02-29 ASSESSMENT — PAIN SCALES - GENERAL: PAINLEVEL_OUTOF10: 5

## 2024-03-01 NOTE — WOUND CARE
[facility]   Medical Staff Progress Note     Hans Trivedi  MEDICAL RECORD NUMBER:  [id number]  AGE: 63 y.o.   GENDER: male  : 1960  EPISODE DATE:  2024    Chief complaint and reason for visit:     Venous ulcer      Patient presenting for follow up evaluation of wound(s) per chief complaint.      Subjective: Symptoms, wound related issues, or other pertinent wound history since last visit:     HISTORY of PRESENT ILLNESS HPI     Hans Trivedi is a 63 y.o. male who presents today for wound/ulcer evaluation.     History of Wound Context: he has bilateral LE lymphedema and venous ulcers,   He has on and off venous ulcers.    REVIEW OF SYSTEMS  A comprehensive review of systems was negative except for what has been indicated above     Objective:    BP (!) 97/57   Pulse 81   Temp 98.7 °F (37.1 °C) (Oral)   Resp 18   SpO2 96%   Wt Readings from Last 3 Encounters:   23 116.6 kg (257 lb)       PHYSICAL EXAM  General: Alert and in no acute distress. Normal appearing  Skin: Warm and dry, no rash  Head: Normocephalic and atraumatic  Eyes: Extraocular eye movements intact, conjunctivae normal, and sclera anicteric  ENT: Hearing grossly normal bilaterally. Normal appearance  Respiratory: no chest wall tenderness. no respiratory distress  GI: Abdomen non-tender and benign  Musculoskeletal: Baseline range of motion in joints. Nontender calves. No cyanosis. Edema 3+.  Neurologic: Speech normal. At baseline without new focal deficits. Mental status normal or at baseline.    Medical Decision Making:     Venous ulcer of right leg I83.019.  Lymphedema I89.0  Chronic venous insufficiency I87.2  Wounds and Treatment Plan:  Cont compression treatment  No debridement indicated   See flow chart today for full description    Wound 23 Leg Right;Lower scattered open areas (Active)   Wound Image   23 1623   Wound Etiology Venous 23 1623   Dressing Status

## 2024-03-06 ENCOUNTER — HOSPITAL ENCOUNTER (OUTPATIENT)
Facility: HOSPITAL | Age: 64
Discharge: HOME | End: 2024-03-06
Attending: HOSPITALIST
Payer: MEDICARE

## 2024-03-06 VITALS
HEART RATE: 84 BPM | OXYGEN SATURATION: 96 % | RESPIRATION RATE: 18 BRPM | TEMPERATURE: 98.9 F | DIASTOLIC BLOOD PRESSURE: 66 MMHG | SYSTOLIC BLOOD PRESSURE: 123 MMHG

## 2024-03-06 DIAGNOSIS — L97.929 VENOUS ULCER OF LEFT LEG (HCC): ICD-10-CM

## 2024-03-06 DIAGNOSIS — I83.019 VENOUS ULCER OF RIGHT LEG (HCC): Primary | ICD-10-CM

## 2024-03-06 DIAGNOSIS — E66.01 MORBIDLY OBESE (HCC): ICD-10-CM

## 2024-03-06 DIAGNOSIS — I83.029 VENOUS ULCER OF LEFT LEG (HCC): ICD-10-CM

## 2024-03-06 DIAGNOSIS — L97.919 VENOUS ULCER OF RIGHT LEG (HCC): Primary | ICD-10-CM

## 2024-03-06 DIAGNOSIS — I89.0 LYMPHEDEMA: ICD-10-CM

## 2024-03-06 DIAGNOSIS — I87.2 CHRONIC VENOUS INSUFFICIENCY: ICD-10-CM

## 2024-03-06 PROCEDURE — 99212 OFFICE O/P EST SF 10 MIN: CPT

## 2024-03-06 RX ORDER — SODIUM CHLOR/HYPOCHLOROUS ACID 0.033 %
SOLUTION, IRRIGATION IRRIGATION ONCE
OUTPATIENT
Start: 2024-03-06 | End: 2024-03-06

## 2024-03-06 RX ORDER — IBUPROFEN 200 MG
TABLET ORAL ONCE
OUTPATIENT
Start: 2024-03-06 | End: 2024-03-06

## 2024-03-06 RX ORDER — CLOBETASOL PROPIONATE 0.5 MG/G
OINTMENT TOPICAL ONCE
OUTPATIENT
Start: 2024-03-06 | End: 2024-03-06

## 2024-03-06 RX ORDER — TRIAMCINOLONE ACETONIDE 1 MG/G
OINTMENT TOPICAL ONCE
Status: CANCELLED | OUTPATIENT
Start: 2024-03-06 | End: 2024-03-06

## 2024-03-06 RX ORDER — GINSENG 100 MG
CAPSULE ORAL ONCE
OUTPATIENT
Start: 2024-03-06 | End: 2024-03-06

## 2024-03-06 RX ORDER — LIDOCAINE HYDROCHLORIDE 20 MG/ML
JELLY TOPICAL ONCE
OUTPATIENT
Start: 2024-03-06 | End: 2024-03-06

## 2024-03-06 RX ORDER — BACITRACIN ZINC AND POLYMYXIN B SULFATE 500; 1000 [USP'U]/G; [USP'U]/G
OINTMENT TOPICAL ONCE
OUTPATIENT
Start: 2024-03-06 | End: 2024-03-06

## 2024-03-06 RX ORDER — IBUPROFEN 200 MG
TABLET ORAL ONCE
Status: CANCELLED | OUTPATIENT
Start: 2024-03-06 | End: 2024-03-06

## 2024-03-06 RX ORDER — LIDOCAINE HYDROCHLORIDE 20 MG/ML
JELLY TOPICAL ONCE
Status: CANCELLED | OUTPATIENT
Start: 2024-03-06 | End: 2024-03-06

## 2024-03-06 RX ORDER — BETAMETHASONE DIPROPIONATE 0.5 MG/G
CREAM TOPICAL ONCE
OUTPATIENT
Start: 2024-03-06 | End: 2024-03-06

## 2024-03-06 RX ORDER — BETAMETHASONE DIPROPIONATE 0.5 MG/G
CREAM TOPICAL ONCE
Status: CANCELLED | OUTPATIENT
Start: 2024-03-06 | End: 2024-03-06

## 2024-03-06 RX ORDER — GENTAMICIN SULFATE 1 MG/G
OINTMENT TOPICAL ONCE
OUTPATIENT
Start: 2024-03-06 | End: 2024-03-06

## 2024-03-06 RX ORDER — CLOBETASOL PROPIONATE 0.5 MG/G
OINTMENT TOPICAL ONCE
Status: CANCELLED | OUTPATIENT
Start: 2024-03-06 | End: 2024-03-06

## 2024-03-06 RX ORDER — GENTAMICIN SULFATE 1 MG/G
OINTMENT TOPICAL ONCE
Status: CANCELLED | OUTPATIENT
Start: 2024-03-06 | End: 2024-03-06

## 2024-03-06 RX ORDER — TRIAMCINOLONE ACETONIDE 1 MG/G
OINTMENT TOPICAL ONCE
OUTPATIENT
Start: 2024-03-06 | End: 2024-03-06

## 2024-03-06 RX ORDER — SODIUM CHLOR/HYPOCHLOROUS ACID 0.033 %
SOLUTION, IRRIGATION IRRIGATION ONCE
Status: CANCELLED | OUTPATIENT
Start: 2024-03-06 | End: 2024-03-06

## 2024-03-06 RX ORDER — GINSENG 100 MG
CAPSULE ORAL ONCE
Status: CANCELLED | OUTPATIENT
Start: 2024-03-06 | End: 2024-03-06

## 2024-03-06 RX ORDER — BACITRACIN ZINC AND POLYMYXIN B SULFATE 500; 1000 [USP'U]/G; [USP'U]/G
OINTMENT TOPICAL ONCE
Status: CANCELLED | OUTPATIENT
Start: 2024-03-06 | End: 2024-03-06

## 2024-03-06 ASSESSMENT — PAIN SCALES - GENERAL: PAINLEVEL_OUTOF10: 2

## 2024-03-07 NOTE — DISCHARGE INSTRUCTIONS
Wound Care Discharge Instructions  Wound Care Clinic at [facility]                                     [address]             [address]               [place] [address]                                         Telephone: [phone]      FAX [phone]    NAME:  Hans Trivedi  YOB: 1960  MEDICAL RECORD NUMBER:  [id number]  DATE:  3/6/2024    Congratulations!  You have completed your treatment.     Return to your Primary Care Physician for all your health issues.   Resume your ordinary activities as tolerated.   Take your medications as prescribed by your primary care physician.   Check your skin daily for cracks, bruises, sores, or dryness. Use a moisturizer as needed.   Clean and dry your skin, using mild soap and warm water (not hot).   Avoid alcohol and caffeine and do not smoke.  Maintain a nutritious diet.  Avoid pressure on your wound site. Keep your legs elevated above the level of the heart whenever possible.  Continue to use wraps/stockings/compression as prescribed.  Replace compression stockings every four to six months as needed to ensure proper fit.   Wear well-fitting shoes and leg garments.    THANK YOU FOR ALLOWING US TO SERVE YOU.  PLEASE CALL IF YOU DEVELOP ANOTHER WOUND. [phone])     Electronically signed by KALIA CAMACHO RN on 3/6/2024 at 2:45 PM

## 2024-03-08 NOTE — WOUND CARE
[facility]   Medical Staff Progress Note     Hans Trivedi  MEDICAL RECORD NUMBER:  [id number]  AGE: 63 y.o.   GENDER: male  : 1960  EPISODE DATE:  3/6/2024    Chief complaint and reason for visit:     Venous ulcer      Patient presenting for follow up evaluation of wound(s) per chief complaint.      Subjective: Symptoms, wound related issues, or other pertinent wound history since last visit:     HISTORY of PRESENT ILLNESS HPI     Hans Trivedi is a 63 y.o. male who presents today for wound/ulcer evaluation.     History of Wound Context: he has bilateral LE lymphedema and venous ulcers,   He has on and off venous ulcers.    REVIEW OF SYSTEMS  A comprehensive review of systems was negative except for what has been indicated above     Objective:    /66   Pulse 84   Temp 98.9 °F (37.2 °C) (Oral)   Resp 18   SpO2 96%   Wt Readings from Last 3 Encounters:   23 116.6 kg (257 lb)       PHYSICAL EXAM  General: Alert and in no acute distress. Normal appearing  Skin: Warm and dry, no rash  Head: Normocephalic and atraumatic  Eyes: Extraocular eye movements intact, conjunctivae normal, and sclera anicteric  ENT: Hearing grossly normal bilaterally. Normal appearance  Respiratory: no chest wall tenderness. no respiratory distress  GI: Abdomen non-tender and benign  Musculoskeletal: Baseline range of motion in joints. Nontender calves. No cyanosis. Edema 3+.  Neurologic: Speech normal. At baseline without new focal deficits. Mental status normal or at baseline.    Medical Decision Making:     Venous ulcer of right leg I83.019.  Lymphedema I89.0  Chronic venous insufficiency I87.2.  Now healed  Wounds and Treatment Plan:  Cont compression treatment  No debridement indicated   See flow chart today for full description    Wound 23 Leg Right;Lower scattered open areas (Active)   Number of days: 371       Wound 23 Pretibial Left scattered open areas

## 2024-07-03 ENCOUNTER — HOSPITAL ENCOUNTER (OUTPATIENT)
Facility: HOSPITAL | Age: 64
Discharge: HOME | End: 2024-07-03
Attending: HOSPITALIST
Payer: MEDICARE

## 2024-07-03 VITALS
SYSTOLIC BLOOD PRESSURE: 98 MMHG | TEMPERATURE: 99.1 F | OXYGEN SATURATION: 97 % | DIASTOLIC BLOOD PRESSURE: 57 MMHG | HEART RATE: 68 BPM | RESPIRATION RATE: 18 BRPM

## 2024-07-03 DIAGNOSIS — I83.029 VENOUS ULCER OF LEFT LEG (HCC): ICD-10-CM

## 2024-07-03 DIAGNOSIS — L97.929 VENOUS ULCER OF LEFT LEG (HCC): ICD-10-CM

## 2024-07-03 DIAGNOSIS — E66.01 MORBIDLY OBESE (HCC): ICD-10-CM

## 2024-07-03 DIAGNOSIS — I87.2 CHRONIC VENOUS INSUFFICIENCY: Primary | ICD-10-CM

## 2024-07-03 PROCEDURE — 29581 APPL MULTLAYER CMPRN SYS LEG: CPT

## 2024-07-03 RX ORDER — TRIAMCINOLONE ACETONIDE 1 MG/G
OINTMENT TOPICAL ONCE
OUTPATIENT
Start: 2024-07-03 | End: 2024-07-03

## 2024-07-03 RX ORDER — SODIUM CHLOR/HYPOCHLOROUS ACID 0.033 %
SOLUTION, IRRIGATION IRRIGATION ONCE
OUTPATIENT
Start: 2024-07-03 | End: 2024-07-03

## 2024-07-03 RX ORDER — GENTAMICIN SULFATE 1 MG/G
OINTMENT TOPICAL ONCE
OUTPATIENT
Start: 2024-07-03 | End: 2024-07-03

## 2024-07-03 RX ORDER — GINSENG 100 MG
CAPSULE ORAL ONCE
OUTPATIENT
Start: 2024-07-03 | End: 2024-07-03

## 2024-07-03 RX ORDER — BETAMETHASONE DIPROPIONATE 0.5 MG/G
CREAM TOPICAL ONCE
OUTPATIENT
Start: 2024-07-03 | End: 2024-07-03

## 2024-07-03 RX ORDER — LIDOCAINE HYDROCHLORIDE 20 MG/ML
JELLY TOPICAL ONCE
OUTPATIENT
Start: 2024-07-03 | End: 2024-07-03

## 2024-07-03 RX ORDER — CLOBETASOL PROPIONATE 0.5 MG/G
OINTMENT TOPICAL ONCE
OUTPATIENT
Start: 2024-07-03 | End: 2024-07-03

## 2024-07-03 RX ORDER — IBUPROFEN 200 MG
TABLET ORAL ONCE
OUTPATIENT
Start: 2024-07-03 | End: 2024-07-03

## 2024-07-03 RX ORDER — BACITRACIN ZINC AND POLYMYXIN B SULFATE 500; 1000 [USP'U]/G; [USP'U]/G
OINTMENT TOPICAL ONCE
OUTPATIENT
Start: 2024-07-03 | End: 2024-07-03

## 2024-07-03 ASSESSMENT — PAIN SCALES - GENERAL: PAINLEVEL_OUTOF10: 4

## 2024-07-04 RX ORDER — DOXYCYCLINE HYCLATE 100 MG/1
100 CAPSULE ORAL 2 TIMES DAILY
Qty: 20 CAPSULE | Refills: 0 | Status: SHIPPED | OUTPATIENT
Start: 2024-07-04 | End: 2024-07-14

## 2024-07-04 NOTE — WOUND CARE
[facility]   Medical Staff Progress Note     Hans Trivedi  MEDICAL RECORD NUMBER:  [id number]  AGE: 64 y.o.   GENDER: male  : 1960  EPISODE DATE:  7/3/2024    Chief complaint and reason for visit:     Venous ulcer      Patient presenting for follow up evaluation of wound(s) per chief complaint.      Subjective: Symptoms, wound related issues, or other pertinent wound history since last visit:     HISTORY of PRESENT ILLNESS HPI     Hans Trivedi is a 64 y.o. male who presents today for wound/ulcer evaluation.     History of Wound Context: he has bilateral LE lymphedema and venous ulcers,   He has on and off venous ulcers. He developed venous ulcers bilaterally again.    REVIEW OF SYSTEMS  A comprehensive review of systems was negative except for what has been indicated above     Objective:    BP (!) 98/57   Pulse 68   Temp 99.1 °F (37.3 °C) (Oral)   Resp 18   SpO2 97%   Wt Readings from Last 3 Encounters:   23 116.6 kg (257 lb)       PHYSICAL EXAM  General: Alert and in no acute distress. Normal appearing  Skin: Warm and dry, no rash  Head: Normocephalic and atraumatic  Eyes: Extraocular eye movements intact, conjunctivae normal, and sclera anicteric  ENT: Hearing grossly normal bilaterally. Normal appearance  Respiratory: no chest wall tenderness. no respiratory distress  GI: Abdomen non-tender and benign  Musculoskeletal: Baseline range of motion in joints. Nontender calves. No cyanosis. Edema 3+.  Neurologic: Speech normal. At baseline without new focal deficits. Mental status normal or at baseline.    Medical Decision Making:     Venous ulcer of right leg I83.019.  Lymphedema I89.0  Chronic venous insufficiency I87.2.  Now healed  Wounds and Treatment Plan:  Cont compression treatment  No debridement indicated   See flow chart today for full description    Wound 23 Leg Right;Lower scattered open areas (Active)   Number of days: 489

## 2024-07-17 ENCOUNTER — HOSPITAL ENCOUNTER (OUTPATIENT)
Facility: HOSPITAL | Age: 64
Discharge: HOME | End: 2024-07-17
Attending: HOSPITALIST
Payer: MEDICARE

## 2024-07-17 VITALS
SYSTOLIC BLOOD PRESSURE: 109 MMHG | RESPIRATION RATE: 18 BRPM | TEMPERATURE: 98.3 F | DIASTOLIC BLOOD PRESSURE: 64 MMHG | HEART RATE: 68 BPM | OXYGEN SATURATION: 97 %

## 2024-07-17 DIAGNOSIS — E66.01 MORBIDLY OBESE (HCC): ICD-10-CM

## 2024-07-17 DIAGNOSIS — L97.929 VENOUS ULCER OF LEFT LEG (HCC): ICD-10-CM

## 2024-07-17 DIAGNOSIS — I87.2 CHRONIC VENOUS INSUFFICIENCY: Primary | ICD-10-CM

## 2024-07-17 DIAGNOSIS — I83.029 VENOUS ULCER OF LEFT LEG (HCC): ICD-10-CM

## 2024-07-17 PROCEDURE — 29581 APPL MULTLAYER CMPRN SYS LEG: CPT

## 2024-07-17 RX ORDER — SODIUM CHLOR/HYPOCHLOROUS ACID 0.033 %
SOLUTION, IRRIGATION IRRIGATION ONCE
OUTPATIENT
Start: 2024-07-17 | End: 2024-07-17

## 2024-07-17 RX ORDER — LIDOCAINE HYDROCHLORIDE 20 MG/ML
JELLY TOPICAL ONCE
OUTPATIENT
Start: 2024-07-17 | End: 2024-07-17

## 2024-07-17 RX ORDER — BACITRACIN ZINC AND POLYMYXIN B SULFATE 500; 1000 [USP'U]/G; [USP'U]/G
OINTMENT TOPICAL ONCE
OUTPATIENT
Start: 2024-07-17 | End: 2024-07-17

## 2024-07-17 RX ORDER — GINSENG 100 MG
CAPSULE ORAL ONCE
OUTPATIENT
Start: 2024-07-17 | End: 2024-07-17

## 2024-07-17 RX ORDER — TRIAMCINOLONE ACETONIDE 1 MG/G
OINTMENT TOPICAL ONCE
OUTPATIENT
Start: 2024-07-17 | End: 2024-07-17

## 2024-07-17 RX ORDER — IBUPROFEN 200 MG
TABLET ORAL ONCE
OUTPATIENT
Start: 2024-07-17 | End: 2024-07-17

## 2024-07-17 RX ORDER — GENTAMICIN SULFATE 1 MG/G
OINTMENT TOPICAL ONCE
OUTPATIENT
Start: 2024-07-17 | End: 2024-07-17

## 2024-07-17 RX ORDER — CLOBETASOL PROPIONATE 0.5 MG/G
OINTMENT TOPICAL ONCE
OUTPATIENT
Start: 2024-07-17 | End: 2024-07-17

## 2024-07-17 RX ORDER — BETAMETHASONE DIPROPIONATE 0.5 MG/G
CREAM TOPICAL ONCE
OUTPATIENT
Start: 2024-07-17 | End: 2024-07-17

## 2024-07-17 ASSESSMENT — PAIN SCALES - GENERAL: PAINLEVEL_OUTOF10: 0

## 2024-07-17 NOTE — DISCHARGE INSTRUCTIONS
Discharge Instructions from  [place] [address] [phone] Fax [phone]    Do not remove dressing     Return Appointment:  [] Wound and dressing supply provider:   [] ECF or Home Healthcare:  [] Wound Assessment: [] Physician or NP scheduled for Wound Assessment:   [x] Return Appointment: With MD  in  1 Week(s)  [] Ordered tests:       Wound Care Center Information: Should you experience any significant changes in your wound(s) or have questions about your wound care, please contact the [facility] at MONDAY - FRIDAY 8:00 am - 4:30.  If you need help with your wound outside these hours and cannot wait until we are again available, contact your PCP or go to the hospital emergency room.     PLEASE NOTE: IF YOU ARE UNABLE TO OBTAIN WOUND SUPPLIES, CONTINUE TO USE THE SUPPLIES YOU HAVE AVAILABLE UNTIL YOU ARE ABLE TO REACH US. IT IS MOST IMPORTANT TO KEEP THE WOUND COVERED AT ALL TIMES.

## 2024-07-17 NOTE — FLOWSHEET NOTE
07/17/24 1056   Wound 02/29/24 Leg Left;Anterior   Date First Assessed/Time First Assessed: 02/29/24 1615   Present on Original Admission: Yes  Wound Approximate Age at First Assessment (Weeks): 4 weeks  Primary Wound Type: Venous Ulcer  Location: Leg  Wound Location Orientation: Left;Anterior   Wound Image    Wound Etiology Venous   Dressing Status New dressing applied   Wound Cleansed Wound cleanser   Dressing/Treatment Other (comment)  (transfer)   Offloading for Diabetic Foot Ulcers Offloading not required   Dressing Change Due 07/24/24   Wound Length (cm)   (scattered open areas)   Wound Assessment Pink/red   Drainage Amount Moderate (25-50%)   Drainage Description Serous   Odor None   Arlen-wound Assessment Dry/flaky   Margins Attached edges   Wound Thickness Description not for Pressure Injury Partial thickness   Wound 02/29/24 Leg Anterior;Right   Date First Assessed: 02/29/24   Present on Original Admission: Yes  Wound Approximate Age at First Assessment (Weeks): 4 weeks  Primary Wound Type: Venous Ulcer  Location: Leg  Wound Location Orientation: Anterior;Right   Wound Image    Wound Etiology Venous   Dressing Status New dressing applied   Wound Cleansed Wound cleanser   Dressing/Treatment Other (comment)  (transfer)   Offloading for Diabetic Foot Ulcers Offloading not required   Dressing Change Due 07/24/24   Wound Length (cm)   (scattered open areas)   Wound Assessment Pink/red   Drainage Amount Moderate (25-50%)   Drainage Description Serous   Odor None   Arlen-wound Assessment Dry/flaky   Margins Attached edges   Wound Thickness Description not for Pressure Injury Partial thickness   Diogenes Scale   Sensory Perceptions 4   Moisture 4   Activity 4   Mobility 4   Nutrition 4   Friction and Shear 3   Diogenes Scale Score 23   Wound Follow Up   Require Follow Up Yes     Multilayer Compression Wrap   (Not Unna) Below the Knee    NAME:  Hans Trivedi  YOB: 1960  MEDICAL RECORD NUMBER:

## 2024-07-24 ENCOUNTER — HOSPITAL ENCOUNTER (OUTPATIENT)
Facility: HOSPITAL | Age: 64
Discharge: HOME | End: 2024-07-24
Attending: HOSPITALIST
Payer: MEDICARE

## 2024-07-24 VITALS
RESPIRATION RATE: 18 BRPM | HEART RATE: 66 BPM | SYSTOLIC BLOOD PRESSURE: 93 MMHG | DIASTOLIC BLOOD PRESSURE: 59 MMHG | TEMPERATURE: 98 F

## 2024-07-24 PROCEDURE — 29581 APPL MULTLAYER CMPRN SYS LEG: CPT

## 2024-07-24 NOTE — PLAN OF CARE
[employer]    Supply Company for Compression Stockings:     [employer] [address] [place] [address] f: [phone] f: [phone] p: [phone] [email or web address]      Ordering Center:     [facility]  [address] [facility] [address] [phone]  WOUND CARE Dept: [phone]   FAX NUMBER [phone]    Patient Information:      Roshni Rogers  [address] [address]   [phone]   : 1960  AGE: 64 y.o.     GENDER: male   TODAYS DATE:  2024    Insurance:      PRIMARY INSURANCE:  Plan: [employer] DUAL COMPLETE  Coverage: [facility] MEDICARE  Effective Date: 2023  Group Number: [unfilled]  Subscriber Number: [id number] - (Medicare Managed)    Payer/Plan Subscr  Sex Relation Sub. Ins. ID Effective Group Num   1. [facility] MEDICARE * ROSHNI ROGERS 1960 Male Self [id number]                                   [address]       Patient Information:      Problem List Items Addressed This Visit    None      Wound 23 Leg Right;Lower scattered open areas (Active)   Number of days: 509       Wound 23 Pretibial Left scattered open areas (Active)   Number of days: 509       Wound 24 Leg Left;Anterior (Active)   Wound Image   24 1056   Wound Etiology Venous 24 1056   Dressing Status New dressing applied 24 1056   Wound Cleansed Wound cleanser 24 1056   Dressing/Treatment Other (comment) 24 1056   Offloading for Diabetic Foot Ulcers Offloading not required 24 1056   Dressing Change Due 24 1056   Wound Length (cm) 2 cm 24 1616   Wound Width (cm) 3 cm 24 1616   Wound Depth (cm) 0.1 cm 24 1616   Wound Surface Area (cm^2) 6 cm^2 24 1616   Wound Volume (cm^3) 0.6 cm^3 24 1616   Wound Assessment Pink/red 24 1056   Drainage Amount Moderate (25-50%) 24 1056   Drainage Description Serous 24 1056   Odor None 24

## 2024-07-24 NOTE — DISCHARGE INSTRUCTIONS
Discharge Instructions from  [place] [address] [phone] Fax [phone]    Do not remove dressing     Return Appointment:  [] Wound and dressing supply provider:   [] ECF or Home Healthcare:  [] Wound Assessment: [] Physician or NP scheduled for Wound Assessment:   [x] Return Appointment: With MD  in  1 Week(s)  [] Ordered tests:       Wound Care Center Information: Should you experience any significant changes in your wound(s) or have questions about your wound care, please contact the [facility] at MONDAY - FRIDAY 8:00 am - 4:30.  If you need help with your wound outside these hours and cannot wait until we are again available, contact your PCP or go to the hospital emergency room.     PLEASE NOTE: IF YOU ARE UNABLE TO OBTAIN WOUND SUPPLIES, CONTINUE TO USE THE SUPPLIES YOU HAVE AVAILABLE UNTIL YOU ARE ABLE TO REACH US. IT IS MOST IMPORTANT TO KEEP THE WOUND COVERED AT ALL TIMES.

## 2024-08-01 ENCOUNTER — HOSPITAL ENCOUNTER (OUTPATIENT)
Facility: HOSPITAL | Age: 64
Discharge: HOME | End: 2024-08-01
Attending: HOSPITALIST
Payer: MEDICARE

## 2024-08-01 VITALS
OXYGEN SATURATION: 100 % | RESPIRATION RATE: 18 BRPM | DIASTOLIC BLOOD PRESSURE: 56 MMHG | TEMPERATURE: 98.5 F | SYSTOLIC BLOOD PRESSURE: 89 MMHG

## 2024-08-01 DIAGNOSIS — E66.01 MORBIDLY OBESE (HCC): ICD-10-CM

## 2024-08-01 DIAGNOSIS — I83.029 VENOUS ULCER OF LEFT LEG (HCC): ICD-10-CM

## 2024-08-01 DIAGNOSIS — L97.929 VENOUS ULCER OF LEFT LEG (HCC): ICD-10-CM

## 2024-08-01 DIAGNOSIS — I87.2 CHRONIC VENOUS INSUFFICIENCY: Primary | ICD-10-CM

## 2024-08-01 PROCEDURE — 29581 APPL MULTLAYER CMPRN SYS LEG: CPT

## 2024-08-01 RX ORDER — IBUPROFEN 200 MG
TABLET ORAL ONCE
OUTPATIENT
Start: 2024-08-01 | End: 2024-08-01

## 2024-08-01 RX ORDER — BETAMETHASONE DIPROPIONATE 0.5 MG/G
CREAM TOPICAL ONCE
OUTPATIENT
Start: 2024-08-01 | End: 2024-08-01

## 2024-08-01 RX ORDER — SODIUM CHLOR/HYPOCHLOROUS ACID 0.033 %
SOLUTION, IRRIGATION IRRIGATION ONCE
OUTPATIENT
Start: 2024-08-01 | End: 2024-08-01

## 2024-08-01 RX ORDER — LIDOCAINE HYDROCHLORIDE 20 MG/ML
JELLY TOPICAL ONCE
OUTPATIENT
Start: 2024-08-01 | End: 2024-08-01

## 2024-08-01 RX ORDER — TRIAMCINOLONE ACETONIDE 1 MG/G
OINTMENT TOPICAL ONCE
OUTPATIENT
Start: 2024-08-01 | End: 2024-08-01

## 2024-08-01 RX ORDER — BACITRACIN ZINC AND POLYMYXIN B SULFATE 500; 1000 [USP'U]/G; [USP'U]/G
OINTMENT TOPICAL ONCE
OUTPATIENT
Start: 2024-08-01 | End: 2024-08-01

## 2024-08-01 RX ORDER — GINSENG 100 MG
CAPSULE ORAL ONCE
OUTPATIENT
Start: 2024-08-01 | End: 2024-08-01

## 2024-08-01 RX ORDER — CLOBETASOL PROPIONATE 0.5 MG/G
OINTMENT TOPICAL ONCE
OUTPATIENT
Start: 2024-08-01 | End: 2024-08-01

## 2024-08-01 RX ORDER — GENTAMICIN SULFATE 1 MG/G
OINTMENT TOPICAL ONCE
OUTPATIENT
Start: 2024-08-01 | End: 2024-08-01

## 2024-08-01 ASSESSMENT — PAIN SCALES - GENERAL: PAINLEVEL_OUTOF10: 0

## 2024-08-01 NOTE — FLOWSHEET NOTE
08/01/24 1311   Wound 02/29/24 Leg Left;Anterior   Date First Assessed/Time First Assessed: 02/29/24 1615   Present on Original Admission: Yes  Wound Approximate Age at First Assessment (Weeks): 4 weeks  Primary Wound Type: Venous Ulcer  Location: Leg  Wound Location Orientation: Left;Anterior   Wound Image     Wound Etiology Venous   Dressing Status New dressing applied   Wound Cleansed Wound cleanser   Dressing/Treatment Other (comment)  (transfer, 2 layer wrap)   Offloading for Diabetic Foot Ulcers Offloading not required   Dressing Change Due 07/24/24   Wound Length (cm)   (scatterred open areas)   Wound Assessment Pink/red   Drainage Amount Moderate (25-50%)   Drainage Description Serous   Odor None   Arlen-wound Assessment Dry/flaky   Margins Attached edges   Wound Thickness Description not for Pressure Injury Partial thickness   Wound 02/29/24 Leg Anterior;Right   Date First Assessed: 02/29/24   Present on Original Admission: Yes  Wound Approximate Age at First Assessment (Weeks): 4 weeks  Primary Wound Type: Venous Ulcer  Location: Leg  Wound Location Orientation: Anterior;Right   Wound Image     Wound Etiology Venous   Dressing Status New dressing applied   Wound Cleansed Wound cleanser   Dressing/Treatment Other (comment)  (transfer, 2layer wrap)   Offloading for Diabetic Foot Ulcers Offloading not required   Dressing Change Due 07/24/24   Wound Length (cm)   (small scattered open areas)   Wound Assessment Pink/red   Drainage Amount Moderate (25-50%)   Drainage Description Serous   Odor None   Arlen-wound Assessment Dry/flaky   Margins Attached edges   Wound Thickness Description not for Pressure Injury Partial thickness       Multilayer Compression Wrap   (Not Unna) Below the Knee    NAME:  Hans Trivedi  YOB: 1960  MEDICAL RECORD NUMBER:  [id number]  DATE:  8/1/2024    Multilayer compression wrap: Removed old Multilayer wrap if indicated and wash leg with mild soap/water.  Applied

## 2024-08-02 NOTE — WOUND CARE
Wound 03/02/23 Pretibial Left scattered open areas (Active)   Number of days: 489       Wound 02/29/24 Leg Left;Anterior (Active)   Wound Image    03/06/24 1627   Wound Etiology Venous 03/06/24 1627   Dressing Status New dressing applied 03/06/24 1627   Wound Cleansed Wound cleanser 03/06/24 1627   Dressing/Treatment Alginate 02/29/24 1616   Offloading for Diabetic Foot Ulcers Offloading not required 03/06/24 1627   Wound Length (cm) 2 cm 02/29/24 1616   Wound Width (cm) 3 cm 02/29/24 1616   Wound Depth (cm) 0.1 cm 02/29/24 1616   Wound Surface Area (cm^2) 6 cm^2 02/29/24 1616   Wound Volume (cm^3) 0.6 cm^3 02/29/24 1616   Wound Assessment Epithelialization 03/06/24 1627   Drainage Amount None (dry) 03/06/24 1627   Drainage Description Serous 02/29/24 1616   Odor None 03/06/24 1627   Arlen-wound Assessment Dry/flaky 03/06/24 1627   Margins Attached edges 02/29/24 1616   Wound Thickness Description not for Pressure Injury Partial thickness 02/29/24 1616   Number of days: 125       Wound 02/29/24 Leg Anterior;Right (Active)   Wound Image    03/06/24 1627   Wound Etiology Venous 03/06/24 1627   Dressing Status New dressing applied 03/06/24 1627   Wound Cleansed Wound cleanser 03/06/24 1627   Dressing/Treatment Tubular bandage 03/06/24 1627   Offloading for Diabetic Foot Ulcers Offloading not required 03/06/24 1627   Wound Assessment Epithelialization 03/06/24 1627   Drainage Amount None (dry) 03/06/24 1627   Odor None 03/06/24 1627   Number of days: 126            TIME: E/M Time spent with patient and patient care issues: [] 15-20 min  [x] 21-30 min  [] 31-44 min  [] 45 min or more.     This time also includes physician non-face-to-face service time visit on the date of service such as  Preparing to see the patient (eg, review of tests)  Obtaining and/or reviewing separately obtained history  Performing a medically necessary appropriate examination and/or evaluation  Counseling and educating the

## 2024-08-15 ENCOUNTER — HOSPITAL ENCOUNTER (OUTPATIENT)
Facility: HOSPITAL | Age: 64
Discharge: HOME | End: 2024-08-15
Attending: HOSPITALIST
Payer: MEDICARE

## 2024-08-15 VITALS
OXYGEN SATURATION: 98 % | SYSTOLIC BLOOD PRESSURE: 117 MMHG | HEART RATE: 70 BPM | DIASTOLIC BLOOD PRESSURE: 60 MMHG | TEMPERATURE: 98.6 F | RESPIRATION RATE: 16 BRPM

## 2024-08-15 DIAGNOSIS — E66.01 MORBIDLY OBESE (HCC): ICD-10-CM

## 2024-08-15 DIAGNOSIS — I87.2 CHRONIC VENOUS INSUFFICIENCY: Primary | ICD-10-CM

## 2024-08-15 DIAGNOSIS — L97.929 VENOUS ULCER OF LEFT LEG (HCC): ICD-10-CM

## 2024-08-15 DIAGNOSIS — I83.029 VENOUS ULCER OF LEFT LEG (HCC): ICD-10-CM

## 2024-08-15 PROCEDURE — 29581 APPL MULTLAYER CMPRN SYS LEG: CPT

## 2024-08-15 RX ORDER — BACITRACIN ZINC AND POLYMYXIN B SULFATE 500; 1000 [USP'U]/G; [USP'U]/G
OINTMENT TOPICAL ONCE
OUTPATIENT
Start: 2024-08-15 | End: 2024-08-15

## 2024-08-15 RX ORDER — GINSENG 100 MG
CAPSULE ORAL ONCE
OUTPATIENT
Start: 2024-08-15 | End: 2024-08-15

## 2024-08-15 RX ORDER — GENTAMICIN SULFATE 1 MG/G
OINTMENT TOPICAL ONCE
OUTPATIENT
Start: 2024-08-15 | End: 2024-08-15

## 2024-08-15 RX ORDER — SODIUM CHLOR/HYPOCHLOROUS ACID 0.033 %
SOLUTION, IRRIGATION IRRIGATION ONCE
OUTPATIENT
Start: 2024-08-15 | End: 2024-08-15

## 2024-08-15 RX ORDER — IBUPROFEN 200 MG
TABLET ORAL ONCE
OUTPATIENT
Start: 2024-08-15 | End: 2024-08-15

## 2024-08-15 RX ORDER — LIDOCAINE HYDROCHLORIDE 20 MG/ML
JELLY TOPICAL ONCE
OUTPATIENT
Start: 2024-08-15 | End: 2024-08-15

## 2024-08-15 RX ORDER — BETAMETHASONE DIPROPIONATE 0.5 MG/G
CREAM TOPICAL ONCE
OUTPATIENT
Start: 2024-08-15 | End: 2024-08-15

## 2024-08-15 RX ORDER — CLOBETASOL PROPIONATE 0.5 MG/G
OINTMENT TOPICAL ONCE
OUTPATIENT
Start: 2024-08-15 | End: 2024-08-15

## 2024-08-15 RX ORDER — TRIAMCINOLONE ACETONIDE 1 MG/G
OINTMENT TOPICAL ONCE
OUTPATIENT
Start: 2024-08-15 | End: 2024-08-15

## 2024-08-15 ASSESSMENT — PAIN SCALES - GENERAL: PAINLEVEL_OUTOF10: 0

## 2024-08-16 NOTE — FLOWSHEET NOTE
08/15/24 1347   Wound 02/29/24 Leg Left;Anterior   Date First Assessed/Time First Assessed: 02/29/24 1615   Present on Original Admission: Yes  Wound Approximate Age at First Assessment (Weeks): 4 weeks  Primary Wound Type: Venous Ulcer  Location: Leg  Wound Location Orientation: Left;Anterior   Wound Image   (Pictures not saved)   Wound Etiology Venous   Dressing Status New dressing applied   Wound Cleansed Wound cleanser   Dressing/Treatment Other (comment)  (transfer, 2 layer wrap)   Offloading for Diabetic Foot Ulcers Offloading not required   Dressing Change Due 08/22/24   Wound Length (cm)   (multiple small open areas)   Wound Assessment Pink/red   Drainage Amount Moderate (25-50%)   Drainage Description Serous   Odor None   Arlen-wound Assessment Dry/flaky   Margins Attached edges   Wound Thickness Description not for Pressure Injury Partial thickness   Wound 02/29/24 Leg Anterior;Right   Date First Assessed: 02/29/24   Present on Original Admission: Yes  Wound Approximate Age at First Assessment (Weeks): 4 weeks  Primary Wound Type: Venous Ulcer  Location: Leg  Wound Location Orientation: Anterior;Right   Wound Image   (pictures not saved)   Wound Etiology Venous   Dressing Status New dressing applied   Wound Cleansed Wound cleanser   Dressing/Treatment Other (comment)  (transfer, 2layer wrap)   Offloading for Diabetic Foot Ulcers Offloading not required   Dressing Change Due 08/22/24   Wound Length (cm)   (multiple small open areaas)   Wound Assessment Pink/red   Drainage Amount Moderate (25-50%)   Drainage Description Serous   Odor None   Arlen-wound Assessment Dry/flaky   Margins Attached edges   Wound Thickness Description not for Pressure Injury Partial thickness     Multilayer Compression Wrap   (Not Unna) Below the Knee    NAME:  Hans Trivedi  YOB: 1960  MEDICAL RECORD NUMBER:  [id number]  DATE:  8/15/2024    Multilayer compression wrap: Removed old Multilayer wrap if indicated and

## 2024-08-22 ENCOUNTER — HOSPITAL ENCOUNTER (OUTPATIENT)
Facility: HOSPITAL | Age: 64
Discharge: HOME | End: 2024-08-22
Attending: HOSPITALIST | Admitting: HOSPITALIST
Payer: MEDICARE

## 2024-08-22 VITALS
DIASTOLIC BLOOD PRESSURE: 71 MMHG | TEMPERATURE: 98.6 F | SYSTOLIC BLOOD PRESSURE: 109 MMHG | OXYGEN SATURATION: 98 % | RESPIRATION RATE: 18 BRPM | HEART RATE: 72 BPM

## 2024-08-22 PROCEDURE — 29581 APPL MULTLAYER CMPRN SYS LEG: CPT

## 2024-08-22 ASSESSMENT — PAIN SCALES - GENERAL: PAINLEVEL_OUTOF10: 0

## 2024-08-22 NOTE — FLOWSHEET NOTE
08/22/24 1258   Wound 02/29/24 Leg Left;Anterior   Date First Assessed/Time First Assessed: 02/29/24 1615   Present on Original Admission: Yes  Wound Approximate Age at First Assessment (Weeks): 4 weeks  Primary Wound Type: Venous Ulcer  Location: Leg  Wound Location Orientation: Left;Anterior   Wound Image    Wound Etiology Venous   Dressing Status New dressing applied   Wound Cleansed Wound cleanser   Dressing/Treatment Other (comment)  (transfer, 2 layer wrap)   Offloading for Diabetic Foot Ulcers Offloading not required   Dressing Change Due 08/22/24   Wound Length (cm)   (multiple small open areas)   Wound Assessment Pink/red   Drainage Amount Moderate (25-50%)   Drainage Description Serous   Odor None   Alren-wound Assessment Dry/flaky   Margins Attached edges   Wound Thickness Description not for Pressure Injury Partial thickness   Wound 02/29/24 Leg Anterior;Right   Date First Assessed: 02/29/24   Present on Original Admission: Yes  Wound Approximate Age at First Assessment (Weeks): 4 weeks  Primary Wound Type: Venous Ulcer  Location: Leg  Wound Location Orientation: Anterior;Right   Wound Image     Wound Etiology Venous   Dressing Status New dressing applied   Wound Cleansed Wound cleanser   Dressing/Treatment Other (comment)  (transfer, 2layer wrap)   Offloading for Diabetic Foot Ulcers Offloading not required   Dressing Change Due 08/22/24   Wound Length (cm)   (multiple small open areas)   Wound Assessment Pink/red   Drainage Amount Moderate (25-50%)   Drainage Description Serous   Odor None   Arlen-wound Assessment Dry/flaky   Margins Attached edges   Wound Thickness Description not for Pressure Injury Partial thickness       Multilayer Compression Wrap   (Not Unna) Below the Knee    NAME:  Hans Trivedi  YOB: 1960  MEDICAL RECORD NUMBER:  [id number]  DATE:  8/22/2024    Multilayer compression wrap: Removed old Multilayer wrap if indicated and wash leg with mild soap/water.  Applied

## 2024-08-28 ENCOUNTER — HOSPITAL ENCOUNTER (OUTPATIENT)
Facility: HOSPITAL | Age: 64
Discharge: HOME | End: 2024-08-28
Attending: HOSPITALIST | Admitting: HOSPITALIST

## 2024-08-28 DIAGNOSIS — L97.929 VENOUS ULCER OF LEFT LEG (HCC): ICD-10-CM

## 2024-08-28 DIAGNOSIS — I87.2 CHRONIC VENOUS INSUFFICIENCY: Primary | ICD-10-CM

## 2024-08-28 DIAGNOSIS — I83.029 VENOUS ULCER OF LEFT LEG (HCC): ICD-10-CM

## 2024-08-28 DIAGNOSIS — E66.01 MORBIDLY OBESE (HCC): ICD-10-CM

## 2024-08-28 RX ORDER — GINSENG 100 MG
CAPSULE ORAL ONCE
OUTPATIENT
Start: 2024-08-28 | End: 2024-08-28

## 2024-08-28 RX ORDER — MUPIROCIN 20 MG/G
OINTMENT TOPICAL ONCE
OUTPATIENT
Start: 2024-08-28 | End: 2024-08-28

## 2024-08-28 RX ORDER — BACITRACIN ZINC AND POLYMYXIN B SULFATE 500; 1000 [USP'U]/G; [USP'U]/G
OINTMENT TOPICAL ONCE
OUTPATIENT
Start: 2024-08-28 | End: 2024-08-28

## 2024-08-28 RX ORDER — SODIUM CHLOR/HYPOCHLOROUS ACID 0.033 %
SOLUTION, IRRIGATION IRRIGATION ONCE
OUTPATIENT
Start: 2024-08-28 | End: 2024-08-28

## 2024-08-28 RX ORDER — CLOBETASOL PROPIONATE 0.5 MG/G
OINTMENT TOPICAL ONCE
OUTPATIENT
Start: 2024-08-28 | End: 2024-08-28

## 2024-08-28 RX ORDER — SILVER SULFADIAZINE 10 MG/G
CREAM TOPICAL ONCE
OUTPATIENT
Start: 2024-08-28 | End: 2024-08-28

## 2024-08-28 RX ORDER — LIDOCAINE HYDROCHLORIDE 20 MG/ML
JELLY TOPICAL ONCE
OUTPATIENT
Start: 2024-08-28 | End: 2024-08-28

## 2024-08-28 RX ORDER — NEOMYCIN/BACITRACIN/POLYMYXINB 3.5-400-5K
OINTMENT (GRAM) TOPICAL ONCE
OUTPATIENT
Start: 2024-08-28 | End: 2024-08-28

## 2024-08-28 RX ORDER — TRIAMCINOLONE ACETONIDE 1 MG/G
OINTMENT TOPICAL ONCE
OUTPATIENT
Start: 2024-08-28 | End: 2024-08-28

## 2024-08-28 RX ORDER — GENTAMICIN SULFATE 1 MG/G
OINTMENT TOPICAL ONCE
OUTPATIENT
Start: 2024-08-28 | End: 2024-08-28

## 2024-08-28 RX ORDER — BETAMETHASONE DIPROPIONATE 0.5 MG/G
CREAM TOPICAL ONCE
OUTPATIENT
Start: 2024-08-28 | End: 2024-08-28

## 2024-08-29 NOTE — WOUND CARE
[facility]   Medical Staff Progress Note     Hans Trivedi  MEDICAL RECORD NUMBER:  [id number]  AGE: 64 y.o.   GENDER: male  : 1960  EPISODE DATE:  2024    Chief complaint and reason for visit:     Bilateral LE venous ulcers     Patient presenting for follow up evaluation of wound(s) per chief complaint.      Subjective: Symptoms, wound related issues, or other pertinent wound history since last visit: reviewed with patient.    HISTORY of PRESENT ILLNESS HPI     Hans Trivedi is a 64 y.o. male who presents today for wound/ulcer evaluation.     History of Wound Context: he has bilateral LE lymphedema and venous ulcers,   He has on and off venous ulcers. He developed venous ulcers bilaterally again.      REVIEW OF SYSTEMS  A comprehensive review of systems was negative except for what has been indicated above     Objective:    There were no vitals taken for this visit.  Wt Readings from Last 3 Encounters:   23 116.6 kg (257 lb)       PHYSICAL EXAM  General: Alert and in no acute distress. Normal appearing  Skin: as described below  Head: Normocephalic and atraumatic  Eyes: Extraocular eye movements intact, conjunctivae normal, and sclera anicteric  ENT: Hearing grossly normal bilaterally. Normal appearance  Respiratory: no chest wall tenderness. no respiratory distress  GI: Abdomen non-tender and benign  Musculoskeletal: Baseline range of motion in joints. Nontender calves. No cyanosis. Edema 2+.  Neurologic: Speech normal. At baseline without new focal deficits. Mental status normal or at baseline.    Assessment:   Venous ulcer of right leg I83.019. L97.919  Venous ulcer of left leg I83.029, L97.929  Lymphedema I89.0  Chronic venous insufficiency I87.2.  Morbid Obesity E66.01    Medical Decision Making:   Problems addressed today:    Wound: bilateral LE venous ulcers  Wound measured today and described as per LDA which I reviewed  Wound care as  08/22/24 1258   Dressing Change Due 08/22/24 08/22/24 1258   Wound Assessment Pink/red 08/22/24 1258   Drainage Amount Moderate (25-50%) 08/22/24 1258   Drainage Description Serous 08/22/24 1258   Odor None 08/22/24 1258   Arlen-wound Assessment Dry/flaky 08/22/24 1258   Margins Attached edges 08/22/24 1258   Wound Thickness Description not for Pressure Injury Partial thickness 08/22/24 1258   Number of days: 182              TIME: E/M Time spent with patient and patient care issues: [] 15-20 min  [] 21-30 min  [x] 31-44 min  [] 45 min or more.     This time also includes physician non-face-to-face service time visit on the date of service such as  Preparing to see the patient (eg, review of tests)  Obtaining and/or reviewing separately obtained history  Performing a medically necessary appropriate examination and/or evaluation  Counseling and educating the patient/family/caregiver  Ordering medications, tests, or procedures  Referring and communicating with other health care professionals as needed  Documenting clinical information in the electronic or other health record  Independently interpreting results (not reported separately) and communicating results to the patient/family/caregiver  Care coordination (not reported separately)                    Electronically signed by Villa Goins MD on 8/29/2024 at 9:12 AM

## 2024-08-29 NOTE — FLOWSHEET NOTE
08/28/24 1410   Wound 02/29/24 Leg Left;Anterior   Date First Assessed/Time First Assessed: 02/29/24 1615   Present on Original Admission: Yes  Wound Approximate Age at First Assessment (Weeks): 4 weeks  Primary Wound Type: Venous Ulcer  Location: Leg  Wound Location Orientation: Left;Anterior   Wound Image    Wound Etiology Venous   Dressing Status New dressing applied   Wound Cleansed Wound cleanser   Dressing/Treatment Other (comment)  (transfer, 2 layer wrap)   Offloading for Diabetic Foot Ulcers Offloading not required   Dressing Change Due 09/04/24   Wound Length (cm)   (scattered openings)   Wound Assessment Pink/red   Drainage Amount Moderate (25-50%)   Drainage Description Serous   Odor None   Arlen-wound Assessment Dry/flaky   Margins Attached edges   Wound Thickness Description not for Pressure Injury Partial thickness   Wound 02/29/24 Leg Anterior;Right   Date First Assessed: 02/29/24   Present on Original Admission: Yes  Wound Approximate Age at First Assessment (Weeks): 4 weeks  Primary Wound Type: Venous Ulcer  Location: Leg  Wound Location Orientation: Anterior;Right   Wound Image    Wound Etiology Venous   Dressing Status New dressing applied   Wound Cleansed Wound cleanser   Dressing/Treatment Other (comment)  (transfer, 2layer wrap)   Offloading for Diabetic Foot Ulcers Offloading not required   Dressing Change Due 09/04/24   Wound Length (cm)   (scattered openings)   Wound Assessment Pink/red   Drainage Amount Moderate (25-50%)   Drainage Description Serous   Odor None   Arlen-wound Assessment Dry/flaky   Margins Attached edges   Wound Thickness Description not for Pressure Injury Partial thickness     Multilayer Compression Wrap   (Not Unna) Below the Knee    NAME:  Hans Trivedi  YOB: 1960  MEDICAL RECORD NUMBER:  [id number]  DATE:  8/28/2024    Multilayer compression wrap: Removed old Multilayer wrap if indicated and wash leg with mild soap/water.  Applied moisturizing agent to  dry skin as needed.   Applied primary and secondary dressing as ordered.  Applied multilayered dressing below the knee to right lower leg.  Applied multilayered dressing below the knee to left lower leg.  Instructed patient/caregiver not to remove dressing and to keep it clean and dry.   Instructed patient/caregiver on complications to report to provider, such as pain, numbness in toes, heavy drainage, and slippage of dressing.  Instructed patient on purpose of compression dressing and on activity and exercise recommendations.      Electronically signed by Aliyah Husain RN on 8/29/2024 at 2:13 PM

## 2024-09-05 ENCOUNTER — HOSPITAL ENCOUNTER (OUTPATIENT)
Facility: HOSPITAL | Age: 64
Discharge: HOME | End: 2024-09-05
Attending: HOSPITALIST
Payer: MEDICARE

## 2024-09-05 DIAGNOSIS — I87.2 CHRONIC VENOUS INSUFFICIENCY: Primary | ICD-10-CM

## 2024-09-05 DIAGNOSIS — I83.029 VENOUS ULCER OF LEFT LEG (HCC): ICD-10-CM

## 2024-09-05 DIAGNOSIS — L97.929 VENOUS ULCER OF LEFT LEG (HCC): ICD-10-CM

## 2024-09-05 DIAGNOSIS — E66.01 MORBIDLY OBESE (HCC): ICD-10-CM

## 2024-09-05 PROCEDURE — 29581 APPL MULTLAYER CMPRN SYS LEG: CPT

## 2024-09-05 RX ORDER — TRIAMCINOLONE ACETONIDE 1 MG/G
OINTMENT TOPICAL ONCE
OUTPATIENT
Start: 2024-09-05 | End: 2024-09-05

## 2024-09-05 RX ORDER — SODIUM CHLOR/HYPOCHLOROUS ACID 0.033 %
SOLUTION, IRRIGATION IRRIGATION ONCE
OUTPATIENT
Start: 2024-09-05 | End: 2024-09-05

## 2024-09-05 RX ORDER — NEOMYCIN/BACITRACIN/POLYMYXINB 3.5-400-5K
OINTMENT (GRAM) TOPICAL ONCE
OUTPATIENT
Start: 2024-09-05 | End: 2024-09-05

## 2024-09-05 RX ORDER — MUPIROCIN 20 MG/G
OINTMENT TOPICAL ONCE
OUTPATIENT
Start: 2024-09-05 | End: 2024-09-05

## 2024-09-05 RX ORDER — GENTAMICIN SULFATE 1 MG/G
OINTMENT TOPICAL ONCE
OUTPATIENT
Start: 2024-09-05 | End: 2024-09-05

## 2024-09-05 RX ORDER — BETAMETHASONE DIPROPIONATE 0.5 MG/G
CREAM TOPICAL ONCE
OUTPATIENT
Start: 2024-09-05 | End: 2024-09-05

## 2024-09-05 RX ORDER — CLOBETASOL PROPIONATE 0.5 MG/G
OINTMENT TOPICAL ONCE
OUTPATIENT
Start: 2024-09-05 | End: 2024-09-05

## 2024-09-05 RX ORDER — BACITRACIN ZINC AND POLYMYXIN B SULFATE 500; 1000 [USP'U]/G; [USP'U]/G
OINTMENT TOPICAL ONCE
OUTPATIENT
Start: 2024-09-05 | End: 2024-09-05

## 2024-09-05 RX ORDER — LIDOCAINE HYDROCHLORIDE 20 MG/ML
JELLY TOPICAL ONCE
OUTPATIENT
Start: 2024-09-05 | End: 2024-09-05

## 2024-09-05 RX ORDER — GINSENG 100 MG
CAPSULE ORAL ONCE
OUTPATIENT
Start: 2024-09-05 | End: 2024-09-05

## 2024-09-05 RX ORDER — SILVER SULFADIAZINE 10 MG/G
CREAM TOPICAL ONCE
OUTPATIENT
Start: 2024-09-05 | End: 2024-09-05

## 2024-09-05 ASSESSMENT — PAIN DESCRIPTION - LOCATION: LOCATION: LEG

## 2024-09-05 ASSESSMENT — PAIN DESCRIPTION - ORIENTATION: ORIENTATION: RIGHT;LEFT;POSTERIOR

## 2024-09-05 ASSESSMENT — PAIN SCALES - GENERAL: PAINLEVEL_OUTOF10: 2

## 2024-09-06 VITALS
OXYGEN SATURATION: 98 % | DIASTOLIC BLOOD PRESSURE: 62 MMHG | TEMPERATURE: 98.4 F | SYSTOLIC BLOOD PRESSURE: 100 MMHG | RESPIRATION RATE: 18 BRPM | HEART RATE: 63 BPM

## 2024-09-06 NOTE — FLOWSHEET NOTE
09/05/24 1600   Wound 02/29/24 Leg Left;Anterior   Date First Assessed/Time First Assessed: 02/29/24 1615   Present on Original Admission: Yes  Wound Approximate Age at First Assessment (Weeks): 4 weeks  Primary Wound Type: Venous Ulcer  Location: Leg  Wound Location Orientation: Left;Anterior   Wound Image     Wound Etiology Venous   Dressing Status New dressing applied   Wound Cleansed Wound cleanser   Dressing/Treatment Other (comment)  (transfer, 2 layer wrap)   Offloading for Diabetic Foot Ulcers Offloading not required   Dressing Change Due 09/12/24   Wound Length (cm)   (small scattered areas)   Wound Assessment Pink/red   Drainage Amount Moderate (25-50%)   Drainage Description Serous   Odor None   Arlen-wound Assessment Dry/flaky   Margins Attached edges   Wound Thickness Description not for Pressure Injury Partial thickness   Wound 02/29/24 Leg Anterior;Right   Date First Assessed: 02/29/24   Present on Original Admission: Yes  Wound Approximate Age at First Assessment (Weeks): 4 weeks  Primary Wound Type: Venous Ulcer  Location: Leg  Wound Location Orientation: Anterior;Right   Wound Image     Wound Etiology Venous   Dressing Status New dressing applied   Wound Cleansed Wound cleanser   Dressing/Treatment Other (comment)  (transfer, 2layer wrap)   Offloading for Diabetic Foot Ulcers Offloading not required   Dressing Change Due 09/12/24   Wound Length (cm)   (small scattered open areas)   Wound Assessment Pink/red   Drainage Amount Moderate (25-50%)   Drainage Description Serous   Odor None   Arlen-wound Assessment Dry/flaky   Margins Attached edges   Wound Thickness Description not for Pressure Injury Partial thickness     Multilayer Compression Wrap   (Not Unna) Below the Knee    NAME:  Hans Trivedi  YOB: 1960  MEDICAL RECORD NUMBER:  [id number]  DATE:  9/5/2024    Multilayer compression wrap: Removed old Multilayer wrap if indicated and wash leg with mild soap/water.  Applied

## 2024-09-06 NOTE — DISCHARGE INSTRUCTIONS
Compression Wraps Discharge Instructions   Location: Bilateral legs  Type: 2 layer Wraps    Your doctor has ordered compression therapy for your wound. Compression bandages reduce the swelling, or edema, in your legs and prevent it from returning. The wound care staff will apply your compression wrap. It must be removed and re-applied at least weekly. As the swelling decreases, the boot no longer provides adequate compression and you need a new one. Once applied, you need to know how to take care of your compression wrap.     The boot must stay dry. Do not get it wet in the shower or tub. You may do a partial bath, or you can cover the boot with a large plastic bag, secured at the top, so that no water can get in.    Avoid standing in one place for long periods of time. If you must  one place, shift your weight and change positions often.    If you have CHF, consult your doctor before following the next two recommendations for leg elevation.     When sitting, elevate your legs on pillows, or put blocks under the foot of your bed. Your legs should be higher than your heart.    If your boot becomes painful, or you notice an increase in swelling in your toes, numbness or tingling, or purple color to your toes, remove the wrap and call the Wound Care Center. If it is after hours, call your doctor for instructions or go to the nearest emergency room.

## 2024-09-12 ENCOUNTER — HOSPITAL ENCOUNTER (OUTPATIENT)
Facility: HOSPITAL | Age: 64
Discharge: HOME | End: 2024-09-12
Attending: HOSPITALIST
Payer: MEDICARE

## 2024-09-12 VITALS
SYSTOLIC BLOOD PRESSURE: 108 MMHG | OXYGEN SATURATION: 97 % | RESPIRATION RATE: 18 BRPM | TEMPERATURE: 98.6 F | DIASTOLIC BLOOD PRESSURE: 64 MMHG | HEART RATE: 78 BPM

## 2024-09-12 DIAGNOSIS — I83.029 VENOUS ULCER OF LEFT LEG (HCC): ICD-10-CM

## 2024-09-12 DIAGNOSIS — L97.929 VENOUS ULCER OF LEFT LEG (HCC): ICD-10-CM

## 2024-09-12 DIAGNOSIS — E66.01 MORBIDLY OBESE (HCC): ICD-10-CM

## 2024-09-12 DIAGNOSIS — I87.2 CHRONIC VENOUS INSUFFICIENCY: Primary | ICD-10-CM

## 2024-09-12 PROCEDURE — 29581 APPL MULTLAYER CMPRN SYS LEG: CPT

## 2024-09-12 RX ORDER — TRIAMCINOLONE ACETONIDE 1 MG/G
OINTMENT TOPICAL ONCE
OUTPATIENT
Start: 2024-09-12 | End: 2024-09-12

## 2024-09-12 RX ORDER — GINSENG 100 MG
CAPSULE ORAL ONCE
OUTPATIENT
Start: 2024-09-12 | End: 2024-09-12

## 2024-09-12 RX ORDER — GENTAMICIN SULFATE 1 MG/G
OINTMENT TOPICAL ONCE
OUTPATIENT
Start: 2024-09-12 | End: 2024-09-12

## 2024-09-12 RX ORDER — SILVER SULFADIAZINE 10 MG/G
CREAM TOPICAL ONCE
OUTPATIENT
Start: 2024-09-12 | End: 2024-09-12

## 2024-09-12 RX ORDER — SODIUM CHLOR/HYPOCHLOROUS ACID 0.033 %
SOLUTION, IRRIGATION IRRIGATION ONCE
OUTPATIENT
Start: 2024-09-12 | End: 2024-09-12

## 2024-09-12 RX ORDER — BETAMETHASONE DIPROPIONATE 0.5 MG/G
CREAM TOPICAL ONCE
OUTPATIENT
Start: 2024-09-12 | End: 2024-09-12

## 2024-09-12 RX ORDER — MUPIROCIN 20 MG/G
OINTMENT TOPICAL ONCE
OUTPATIENT
Start: 2024-09-12 | End: 2024-09-12

## 2024-09-12 RX ORDER — LIDOCAINE HYDROCHLORIDE 20 MG/ML
JELLY TOPICAL ONCE
OUTPATIENT
Start: 2024-09-12 | End: 2024-09-12

## 2024-09-12 RX ORDER — CLOBETASOL PROPIONATE 0.5 MG/G
OINTMENT TOPICAL ONCE
OUTPATIENT
Start: 2024-09-12 | End: 2024-09-12

## 2024-09-12 RX ORDER — BACITRACIN ZINC AND POLYMYXIN B SULFATE 500; 1000 [USP'U]/G; [USP'U]/G
OINTMENT TOPICAL ONCE
OUTPATIENT
Start: 2024-09-12 | End: 2024-09-12

## 2024-09-12 RX ORDER — NEOMYCIN/BACITRACIN/POLYMYXINB 3.5-400-5K
OINTMENT (GRAM) TOPICAL ONCE
OUTPATIENT
Start: 2024-09-12 | End: 2024-09-12

## 2024-09-12 ASSESSMENT — PAIN SCALES - GENERAL: PAINLEVEL_OUTOF10: 2

## 2024-09-12 ASSESSMENT — PAIN DESCRIPTION - ORIENTATION: ORIENTATION: RIGHT;LEFT;POSTERIOR

## 2024-09-12 ASSESSMENT — PAIN DESCRIPTION - LOCATION: LOCATION: ABDOMEN

## 2024-09-25 ENCOUNTER — HOSPITAL ENCOUNTER (OUTPATIENT)
Facility: HOSPITAL | Age: 64
Discharge: HOME | End: 2024-09-25
Attending: HOSPITALIST
Payer: MEDICARE

## 2024-09-25 VITALS
SYSTOLIC BLOOD PRESSURE: 101 MMHG | OXYGEN SATURATION: 96 % | TEMPERATURE: 98.9 F | RESPIRATION RATE: 18 BRPM | HEART RATE: 78 BPM | DIASTOLIC BLOOD PRESSURE: 63 MMHG

## 2024-09-25 DIAGNOSIS — I87.2 CHRONIC VENOUS INSUFFICIENCY: Primary | ICD-10-CM

## 2024-09-25 DIAGNOSIS — L97.929 VENOUS ULCER OF LEFT LEG (HCC): ICD-10-CM

## 2024-09-25 DIAGNOSIS — E66.01 MORBIDLY OBESE: ICD-10-CM

## 2024-09-25 DIAGNOSIS — I83.029 VENOUS ULCER OF LEFT LEG (HCC): ICD-10-CM

## 2024-09-25 PROCEDURE — 29581 APPL MULTLAYER CMPRN SYS LEG: CPT

## 2024-09-25 RX ORDER — TRIAMCINOLONE ACETONIDE 1 MG/G
OINTMENT TOPICAL ONCE
OUTPATIENT
Start: 2024-09-25 | End: 2024-09-25

## 2024-09-25 RX ORDER — NEOMYCIN/BACITRACIN/POLYMYXINB 3.5-400-5K
OINTMENT (GRAM) TOPICAL ONCE
OUTPATIENT
Start: 2024-09-25 | End: 2024-09-25

## 2024-09-25 RX ORDER — SILVER SULFADIAZINE 10 MG/G
CREAM TOPICAL ONCE
OUTPATIENT
Start: 2024-09-25 | End: 2024-09-25

## 2024-09-25 RX ORDER — LIDOCAINE HYDROCHLORIDE 20 MG/ML
JELLY TOPICAL ONCE
OUTPATIENT
Start: 2024-09-25 | End: 2024-09-25

## 2024-09-25 RX ORDER — CLOBETASOL PROPIONATE 0.5 MG/G
OINTMENT TOPICAL ONCE
OUTPATIENT
Start: 2024-09-25 | End: 2024-09-25

## 2024-09-25 RX ORDER — BACITRACIN ZINC AND POLYMYXIN B SULFATE 500; 1000 [USP'U]/G; [USP'U]/G
OINTMENT TOPICAL ONCE
OUTPATIENT
Start: 2024-09-25 | End: 2024-09-25

## 2024-09-25 RX ORDER — GENTAMICIN SULFATE 1 MG/G
OINTMENT TOPICAL ONCE
OUTPATIENT
Start: 2024-09-25 | End: 2024-09-25

## 2024-09-25 RX ORDER — BETAMETHASONE DIPROPIONATE 0.5 MG/G
CREAM TOPICAL ONCE
OUTPATIENT
Start: 2024-09-25 | End: 2024-09-25

## 2024-09-25 RX ORDER — SODIUM CHLOR/HYPOCHLOROUS ACID 0.033 %
SOLUTION, IRRIGATION IRRIGATION ONCE
OUTPATIENT
Start: 2024-09-25 | End: 2024-09-25

## 2024-09-25 RX ORDER — MUPIROCIN 20 MG/G
OINTMENT TOPICAL ONCE
OUTPATIENT
Start: 2024-09-25 | End: 2024-09-25

## 2024-09-25 RX ORDER — GINSENG 100 MG
CAPSULE ORAL ONCE
OUTPATIENT
Start: 2024-09-25 | End: 2024-09-25

## 2024-10-02 ENCOUNTER — HOSPITAL ENCOUNTER (OUTPATIENT)
Facility: HOSPITAL | Age: 64
Discharge: HOME | End: 2024-10-02
Attending: HOSPITALIST
Payer: MEDICARE

## 2024-10-02 VITALS
DIASTOLIC BLOOD PRESSURE: 60 MMHG | RESPIRATION RATE: 18 BRPM | TEMPERATURE: 97.8 F | HEART RATE: 59 BPM | SYSTOLIC BLOOD PRESSURE: 111 MMHG

## 2024-10-02 DIAGNOSIS — I87.2 CHRONIC VENOUS INSUFFICIENCY: Primary | ICD-10-CM

## 2024-10-02 DIAGNOSIS — I83.029 VENOUS ULCER OF LEFT LEG (HCC): ICD-10-CM

## 2024-10-02 DIAGNOSIS — E66.01 MORBIDLY OBESE: ICD-10-CM

## 2024-10-02 DIAGNOSIS — L97.929 VENOUS ULCER OF LEFT LEG (HCC): ICD-10-CM

## 2024-10-02 PROCEDURE — 29581 APPL MULTLAYER CMPRN SYS LEG: CPT

## 2024-10-02 RX ORDER — LIDOCAINE HYDROCHLORIDE 20 MG/ML
JELLY TOPICAL ONCE
OUTPATIENT
Start: 2024-10-02 | End: 2024-10-02

## 2024-10-02 RX ORDER — NEOMYCIN/BACITRACIN/POLYMYXINB 3.5-400-5K
OINTMENT (GRAM) TOPICAL ONCE
OUTPATIENT
Start: 2024-10-02 | End: 2024-10-02

## 2024-10-02 RX ORDER — TRIAMCINOLONE ACETONIDE 1 MG/G
OINTMENT TOPICAL ONCE
OUTPATIENT
Start: 2024-10-02 | End: 2024-10-02

## 2024-10-02 RX ORDER — GENTAMICIN SULFATE 1 MG/G
OINTMENT TOPICAL ONCE
OUTPATIENT
Start: 2024-10-02 | End: 2024-10-02

## 2024-10-02 RX ORDER — SODIUM CHLOR/HYPOCHLOROUS ACID 0.033 %
SOLUTION, IRRIGATION IRRIGATION ONCE
OUTPATIENT
Start: 2024-10-02 | End: 2024-10-02

## 2024-10-02 RX ORDER — CLOBETASOL PROPIONATE 0.5 MG/G
OINTMENT TOPICAL ONCE
OUTPATIENT
Start: 2024-10-02 | End: 2024-10-02

## 2024-10-02 RX ORDER — GINSENG 100 MG
CAPSULE ORAL ONCE
OUTPATIENT
Start: 2024-10-02 | End: 2024-10-02

## 2024-10-02 RX ORDER — SILVER SULFADIAZINE 10 MG/G
CREAM TOPICAL ONCE
OUTPATIENT
Start: 2024-10-02 | End: 2024-10-02

## 2024-10-02 RX ORDER — BETAMETHASONE DIPROPIONATE 0.5 MG/G
CREAM TOPICAL ONCE
OUTPATIENT
Start: 2024-10-02 | End: 2024-10-02

## 2024-10-02 RX ORDER — BACITRACIN ZINC AND POLYMYXIN B SULFATE 500; 1000 [USP'U]/G; [USP'U]/G
OINTMENT TOPICAL ONCE
OUTPATIENT
Start: 2024-10-02 | End: 2024-10-02

## 2024-10-02 RX ORDER — MUPIROCIN 20 MG/G
OINTMENT TOPICAL ONCE
OUTPATIENT
Start: 2024-10-02 | End: 2024-10-02

## 2024-10-02 NOTE — DISCHARGE INSTRUCTIONS
Discharge Instructions from  [place] [address] [phone] Fax [phone]    Do not remove dressing     Return Appointment:  [] Wound and dressing supply provider:   [] ECF or Home Healthcare:  [] Wound Assessment: [] Physician or NP scheduled for Wound Assessment:   [x] Return Appointment: With MD  in  1 Week(s)  [] Ordered tests:       Wound Care Center Information: Should you experience any significant changes in your wound(s) or have questions about your wound care, please contact the [facility] at MONDAY - FRIDAY 8:00 am - 4:30.  If you need help with your wound outside these hours and cannot wait until we are again available, contact your PCP or go to the hospital emergency room.     PLEASE NOTE: IF YOU ARE UNABLE TO OBTAIN WOUND SUPPLIES, CONTINUE TO USE THE SUPPLIES YOU HAVE AVAILABLE UNTIL YOU ARE ABLE TO REACH US. IT IS MOST IMPORTANT TO KEEP THE WOUND COVERED AT ALL TIMES.

## 2024-10-02 NOTE — FLOWSHEET NOTE
10/02/24 1607   Wound 02/29/24 Leg Left;Anterior   Date First Assessed/Time First Assessed: 02/29/24 1615   Present on Original Admission: Yes  Wound Approximate Age at First Assessment (Weeks): 4 weeks  Primary Wound Type: Venous Ulcer  Location: Leg  Wound Location Orientation: Left;Anterior   Wound Image    Wound Etiology Venous   Dressing Status New dressing applied   Wound Cleansed Wound cleanser   Dressing/Treatment Other (comment);Xeroform  (Gent violet, 2 layer wrap)   Offloading for Diabetic Foot Ulcers Offloading not required   Dressing Change Due 10/10/24   Wound Assessment Pink/red   Drainage Amount Moderate (25-50%)   Drainage Description Serous   Odor None   Arlen-wound Assessment Dry/flaky   Margins Attached edges   Wound Thickness Description not for Pressure Injury Partial thickness   Wound 02/29/24 Leg Anterior;Right   Date First Assessed: 02/29/24   Present on Original Admission: Yes  Wound Approximate Age at First Assessment (Weeks): 4 weeks  Primary Wound Type: Venous Ulcer  Location: Leg  Wound Location Orientation: Anterior;Right   Wound Image    Wound Etiology Venous   Dressing Status New dressing applied   Wound Cleansed Wound cleanser   Dressing/Treatment Other (comment);Xeroform  (Gent violet, 2layer wrap)   Offloading for Diabetic Foot Ulcers Offloading not required   Dressing Change Due 10/10/24   Wound Assessment Pink/red   Drainage Amount Moderate (25-50%)   Drainage Description Serous   Odor None   Arlen-wound Assessment Dry/flaky   Margins Attached edges   Wound Thickness Description not for Pressure Injury Partial thickness   Diogenes Scale   Sensory Perceptions 4   Moisture 4   Activity 4   Mobility 4   Nutrition 4   Friction and Shear 3   Diogenes Scale Score 23   Wound Follow Up   Require Follow Up Yes     Multilayer Compression Wrap   (Not Unna) Below the Knee    NAME:  Hans Trivedi  YOB: 1960  MEDICAL RECORD NUMBER:  [id number]  DATE:  10/2/2024    Multilayer

## 2024-10-03 NOTE — WOUND CARE
[facility]   Medical Staff Progress Note     Hans Trivedi  MEDICAL RECORD NUMBER:  [id number]  AGE: 64 y.o.   GENDER: male  : 1960  EPISODE DATE:  10/2/2024    Chief complaint and reason for visit:     Bilateral LE venous ulcers     Patient presenting for follow up evaluation of wound(s) per chief complaint.      Subjective: Symptoms, wound related issues, or other pertinent wound history since last visit: reviewed with patient.    HISTORY of PRESENT ILLNESS HPI     Hans Trivedi is a 64 y.o. male who presents today for wound/ulcer evaluation.     History of Wound Context: he has bilateral LE lymphedema and venous ulcers,   He has on and off venous ulcers. He developed venous ulcers bilaterally again.      REVIEW OF SYSTEMS  A comprehensive review of systems was negative except for what has been indicated above     Objective:    /60   Pulse 59   Temp 97.8 °F (36.6 °C) (Oral)   Resp 18   Wt Readings from Last 3 Encounters:   23 116.6 kg (257 lb)       PHYSICAL EXAM  General: Alert and in no acute distress. Normal appearing  Skin: as described below  Head: Normocephalic and atraumatic  Eyes: Extraocular eye movements intact, conjunctivae normal, and sclera anicteric  ENT: Hearing grossly normal bilaterally. Normal appearance  Respiratory: no chest wall tenderness. no respiratory distress  GI: Abdomen non-tender and benign  Musculoskeletal: Baseline range of motion in joints. Nontender calves. No cyanosis. Edema 2+.  Neurologic: Speech normal. At baseline without new focal deficits. Mental status normal or at baseline.    Assessment:   Venous ulcer of right leg I83.019. L97.919  Venous ulcer of left leg I83.029, L97.929  Lymphedema I89.0  Chronic venous insufficiency I87.2.  Morbid Obesity E66.01    Medical Decision Making:   Problems addressed today:    Wound: bilateral LE venous ulcers  Wound measured today and described as per LDA which I

## 2024-10-10 ENCOUNTER — HOSPITAL ENCOUNTER (OUTPATIENT)
Facility: HOSPITAL | Age: 64
Discharge: HOME | End: 2024-10-10
Payer: MEDICARE

## 2024-10-10 DIAGNOSIS — I83.029 VENOUS ULCER OF LEFT LEG (HCC): ICD-10-CM

## 2024-10-10 DIAGNOSIS — L97.929 VENOUS ULCER OF LEFT LEG (HCC): ICD-10-CM

## 2024-10-10 DIAGNOSIS — E66.01 MORBIDLY OBESE: ICD-10-CM

## 2024-10-10 DIAGNOSIS — I87.2 CHRONIC VENOUS INSUFFICIENCY: Primary | ICD-10-CM

## 2024-10-10 PROCEDURE — 17250 CHEM CAUT OF GRANLTJ TISSUE: CPT

## 2024-10-10 RX ORDER — SILVER SULFADIAZINE 10 MG/G
CREAM TOPICAL ONCE
OUTPATIENT
Start: 2024-10-10 | End: 2024-10-10

## 2024-10-10 RX ORDER — TRIAMCINOLONE ACETONIDE 1 MG/G
OINTMENT TOPICAL ONCE
OUTPATIENT
Start: 2024-10-10 | End: 2024-10-10

## 2024-10-10 RX ORDER — BETAMETHASONE DIPROPIONATE 0.5 MG/G
CREAM TOPICAL ONCE
OUTPATIENT
Start: 2024-10-10 | End: 2024-10-10

## 2024-10-10 RX ORDER — NEOMYCIN/BACITRACIN/POLYMYXINB 3.5-400-5K
OINTMENT (GRAM) TOPICAL ONCE
OUTPATIENT
Start: 2024-10-10 | End: 2024-10-10

## 2024-10-10 RX ORDER — BACITRACIN ZINC AND POLYMYXIN B SULFATE 500; 1000 [USP'U]/G; [USP'U]/G
OINTMENT TOPICAL ONCE
OUTPATIENT
Start: 2024-10-10 | End: 2024-10-10

## 2024-10-10 RX ORDER — LIDOCAINE HYDROCHLORIDE 20 MG/ML
JELLY TOPICAL ONCE
OUTPATIENT
Start: 2024-10-10 | End: 2024-10-10

## 2024-10-10 RX ORDER — GINSENG 100 MG
CAPSULE ORAL ONCE
OUTPATIENT
Start: 2024-10-10 | End: 2024-10-10

## 2024-10-10 RX ORDER — CLOBETASOL PROPIONATE 0.5 MG/G
OINTMENT TOPICAL ONCE
OUTPATIENT
Start: 2024-10-10 | End: 2024-10-10

## 2024-10-10 RX ORDER — MUPIROCIN 20 MG/G
OINTMENT TOPICAL ONCE
OUTPATIENT
Start: 2024-10-10 | End: 2024-10-10

## 2024-10-10 RX ORDER — GENTAMICIN SULFATE 1 MG/G
OINTMENT TOPICAL ONCE
OUTPATIENT
Start: 2024-10-10 | End: 2024-10-10

## 2024-10-10 RX ORDER — SODIUM CHLOR/HYPOCHLOROUS ACID 0.033 %
SOLUTION, IRRIGATION IRRIGATION ONCE
OUTPATIENT
Start: 2024-10-10 | End: 2024-10-10

## 2024-10-16 ENCOUNTER — HOSPITAL ENCOUNTER (OUTPATIENT)
Facility: HOSPITAL | Age: 64
Discharge: HOME | End: 2024-10-16
Payer: MEDICARE

## 2024-10-16 DIAGNOSIS — E66.01 MORBIDLY OBESE: ICD-10-CM

## 2024-10-16 DIAGNOSIS — I87.2 CHRONIC VENOUS INSUFFICIENCY: Primary | ICD-10-CM

## 2024-10-16 DIAGNOSIS — I83.029 VENOUS ULCER OF LEFT LEG (HCC): ICD-10-CM

## 2024-10-16 DIAGNOSIS — L97.929 VENOUS ULCER OF LEFT LEG (HCC): ICD-10-CM

## 2024-10-16 PROCEDURE — 29581 APPL MULTLAYER CMPRN SYS LEG: CPT

## 2024-10-16 RX ORDER — BACITRACIN ZINC AND POLYMYXIN B SULFATE 500; 1000 [USP'U]/G; [USP'U]/G
OINTMENT TOPICAL ONCE
OUTPATIENT
Start: 2024-10-16 | End: 2024-10-16

## 2024-10-16 RX ORDER — SODIUM CHLOR/HYPOCHLOROUS ACID 0.033 %
SOLUTION, IRRIGATION IRRIGATION ONCE
OUTPATIENT
Start: 2024-10-16 | End: 2024-10-16

## 2024-10-16 RX ORDER — GENTAMICIN SULFATE 1 MG/G
OINTMENT TOPICAL ONCE
OUTPATIENT
Start: 2024-10-16 | End: 2024-10-16

## 2024-10-16 RX ORDER — SILVER SULFADIAZINE 10 MG/G
CREAM TOPICAL ONCE
OUTPATIENT
Start: 2024-10-16 | End: 2024-10-16

## 2024-10-16 RX ORDER — LIDOCAINE HYDROCHLORIDE 20 MG/ML
JELLY TOPICAL ONCE
OUTPATIENT
Start: 2024-10-16 | End: 2024-10-16

## 2024-10-16 RX ORDER — CLOBETASOL PROPIONATE 0.5 MG/G
OINTMENT TOPICAL ONCE
OUTPATIENT
Start: 2024-10-16 | End: 2024-10-16

## 2024-10-16 RX ORDER — GINSENG 100 MG
CAPSULE ORAL ONCE
OUTPATIENT
Start: 2024-10-16 | End: 2024-10-16

## 2024-10-16 RX ORDER — NEOMYCIN/BACITRACIN/POLYMYXINB 3.5-400-5K
OINTMENT (GRAM) TOPICAL ONCE
OUTPATIENT
Start: 2024-10-16 | End: 2024-10-16

## 2024-10-16 RX ORDER — BETAMETHASONE DIPROPIONATE 0.5 MG/G
CREAM TOPICAL ONCE
OUTPATIENT
Start: 2024-10-16 | End: 2024-10-16

## 2024-10-16 RX ORDER — TRIAMCINOLONE ACETONIDE 1 MG/G
OINTMENT TOPICAL ONCE
OUTPATIENT
Start: 2024-10-16 | End: 2024-10-16

## 2024-10-16 RX ORDER — MUPIROCIN 20 MG/G
OINTMENT TOPICAL ONCE
OUTPATIENT
Start: 2024-10-16 | End: 2024-10-16

## 2024-10-17 VITALS
DIASTOLIC BLOOD PRESSURE: 65 MMHG | TEMPERATURE: 98.6 F | RESPIRATION RATE: 18 BRPM | SYSTOLIC BLOOD PRESSURE: 98 MMHG | HEART RATE: 75 BPM

## 2024-10-17 NOTE — FLOWSHEET NOTE
10/16/24 1430   Wound 02/29/24 Leg Left;Anterior   Date First Assessed/Time First Assessed: 02/29/24 1615   Present on Original Admission: Yes  Wound Approximate Age at First Assessment (Weeks): 4 weeks  Primary Wound Type: Venous Ulcer  Location: Leg  Wound Location Orientation: Left;Anterior   Wound Image    Wound Etiology Venous   Dressing Status New dressing applied   Wound Cleansed Wound cleanser   Dressing/Treatment Dry dressing   Offloading for Diabetic Foot Ulcers Offloading not required   Dressing Change Due 10/16/24   Wound Length (cm)   (scattered open areas)   Wound Assessment Pink/red   Drainage Amount Small (< 25%)   Drainage Description Serous   Odor None   Alren-wound Assessment Fragile   Margins Attached edges   Wound Thickness Description not for Pressure Injury Partial thickness   Wound 02/29/24 Leg Anterior;Right   Date First Assessed: 02/29/24   Present on Original Admission: Yes  Wound Approximate Age at First Assessment (Weeks): 4 weeks  Primary Wound Type: Venous Ulcer  Location: Leg  Wound Location Orientation: Anterior;Right   Wound Image    Wound Etiology Venous   Dressing Status New dressing applied   Wound Cleansed Wound cleanser   Dressing/Treatment Dry dressing   Offloading for Diabetic Foot Ulcers Offloading not required   Dressing Change Due 10/16/24   Wound Length (cm)   (scattered open areas)   Wound Assessment Pink/red   Drainage Amount Small (< 25%)   Drainage Description Serous   Odor None   Arlen-wound Assessment Fragile   Margins Attached edges   Wound Thickness Description not for Pressure Injury Partial thickness     Multilayer Compression Wrap   (Not Unna) Below the Knee    NAME:  Hans Trivedi  YOB: 1960  MEDICAL RECORD NUMBER:  [id number]  DATE:  10/16/2024    Multilayer compression wrap: Removed old Multilayer wrap if indicated and wash leg with mild soap/water.  Applied moisturizing agent to dry skin as needed.   Applied primary and secondary dressing as

## 2024-10-17 NOTE — DISCHARGE INSTRUCTIONS
Discharge Instructions from  [place] [address] [phone] Fax [phone]    Do not remove dressing     Return Appointment:  [] Wound and dressing supply provider:   [] ECF or Home Healthcare:  [] Wound Assessment: [] Physician or NP scheduled for Wound Assessment:   [x] Return Appointment: With MD  in  1 Week(s)  [] Ordered tests:       Wound Care Center Information: Should you experience any significant changes in your wound(s) or have questions about your wound care, please contact the [facility] at MONDAY - FRIDAY 8:00 am - 4:30.  If you need help with your wound outside these hours and cannot wait until we are again available, contact your PCP or go to the hospital emergency room.     PLEASE NOTE: IF YOU ARE UNABLE TO OBTAIN WOUND SUPPLIES, CONTINUE TO USE THE SUPPLIES YOU HAVE AVAILABLE UNTIL YOU ARE ABLE TO REACH US. IT IS MOST IMPORTANT TO KEEP THE WOUND COVERED AT ALL TIMES.

## 2024-10-17 NOTE — WOUND CARE
[facility]   Medical Staff Progress Note     Hans Trivedi  MEDICAL RECORD NUMBER:  [id number]  AGE: 64 y.o.   GENDER: male  : 1960  EPISODE DATE:  10/16/2024    Chief complaint and reason for visit:     Bilateral LE venous ulcers     Patient presenting for follow up evaluation of wound(s) per chief complaint.      Subjective: Symptoms, wound related issues, or other pertinent wound history since last visit: reviewed with patient.    HISTORY of PRESENT ILLNESS HPI     Hans Trivedi is a 64 y.o. male who presents today for wound/ulcer evaluation.     History of Wound Context: he has bilateral LE lymphedema and venous ulcers,   He has on and off venous ulcers. He developed venous ulcers bilaterally again.      REVIEW OF SYSTEMS  A comprehensive review of systems was negative except for what has been indicated above     Objective:    There were no vitals taken for this visit.  Wt Readings from Last 3 Encounters:   23 116.6 kg (257 lb)       PHYSICAL EXAM  General: Alert and in no acute distress. Normal appearing  Skin: as described below  Head: Normocephalic and atraumatic  Eyes: Extraocular eye movements intact, conjunctivae normal, and sclera anicteric  ENT: Hearing grossly normal bilaterally. Normal appearance  Respiratory: no chest wall tenderness. no respiratory distress  GI: Abdomen non-tender and benign  Musculoskeletal: Baseline range of motion in joints. Nontender calves. No cyanosis. Edema 2+.  Neurologic: Speech normal. At baseline without new focal deficits. Mental status normal or at baseline.    Assessment:   Venous ulcer of right leg I83.019. L97.919  Venous ulcer of left leg I83.029, L97.929  Lymphedema I89.0  Chronic venous insufficiency I87.2.  Morbid Obesity E66.01    Medical Decision Making:   Problems addressed today:    Wound: bilateral LE venous ulcers  Wound measured today and described as per LDA which I reviewed  Wound care as

## 2024-10-23 ENCOUNTER — HOSPITAL ENCOUNTER (OUTPATIENT)
Facility: HOSPITAL | Age: 64
Discharge: HOME | End: 2024-10-23
Attending: HOSPITALIST | Admitting: HOSPITALIST
Payer: MEDICARE

## 2024-10-23 VITALS
HEART RATE: 79 BPM | TEMPERATURE: 98.8 F | DIASTOLIC BLOOD PRESSURE: 61 MMHG | SYSTOLIC BLOOD PRESSURE: 95 MMHG | OXYGEN SATURATION: 96 % | RESPIRATION RATE: 18 BRPM

## 2024-10-23 DIAGNOSIS — E66.01 MORBIDLY OBESE: ICD-10-CM

## 2024-10-23 DIAGNOSIS — I83.029 VENOUS ULCER OF LEFT LEG (HCC): ICD-10-CM

## 2024-10-23 DIAGNOSIS — I87.2 CHRONIC VENOUS INSUFFICIENCY: Primary | ICD-10-CM

## 2024-10-23 DIAGNOSIS — L97.929 VENOUS ULCER OF LEFT LEG (HCC): ICD-10-CM

## 2024-10-23 PROCEDURE — 29581 APPL MULTLAYER CMPRN SYS LEG: CPT

## 2024-10-23 RX ORDER — LIDOCAINE HYDROCHLORIDE 20 MG/ML
JELLY TOPICAL ONCE
OUTPATIENT
Start: 2024-10-23 | End: 2024-10-23

## 2024-10-23 RX ORDER — TRIAMCINOLONE ACETONIDE 1 MG/G
OINTMENT TOPICAL ONCE
OUTPATIENT
Start: 2024-10-23 | End: 2024-10-23

## 2024-10-23 RX ORDER — NEOMYCIN/BACITRACIN/POLYMYXINB 3.5-400-5K
OINTMENT (GRAM) TOPICAL ONCE
OUTPATIENT
Start: 2024-10-23 | End: 2024-10-23

## 2024-10-23 RX ORDER — BETAMETHASONE DIPROPIONATE 0.5 MG/G
CREAM TOPICAL ONCE
OUTPATIENT
Start: 2024-10-23 | End: 2024-10-23

## 2024-10-23 RX ORDER — CLOBETASOL PROPIONATE 0.5 MG/G
OINTMENT TOPICAL ONCE
OUTPATIENT
Start: 2024-10-23 | End: 2024-10-23

## 2024-10-23 RX ORDER — SILVER SULFADIAZINE 10 MG/G
CREAM TOPICAL ONCE
OUTPATIENT
Start: 2024-10-23 | End: 2024-10-23

## 2024-10-23 RX ORDER — GINSENG 100 MG
CAPSULE ORAL ONCE
OUTPATIENT
Start: 2024-10-23 | End: 2024-10-23

## 2024-10-23 RX ORDER — SODIUM CHLOR/HYPOCHLOROUS ACID 0.033 %
SOLUTION, IRRIGATION IRRIGATION ONCE
OUTPATIENT
Start: 2024-10-23 | End: 2024-10-23

## 2024-10-23 RX ORDER — BACITRACIN ZINC AND POLYMYXIN B SULFATE 500; 1000 [USP'U]/G; [USP'U]/G
OINTMENT TOPICAL ONCE
OUTPATIENT
Start: 2024-10-23 | End: 2024-10-23

## 2024-10-23 RX ORDER — MUPIROCIN 20 MG/G
OINTMENT TOPICAL ONCE
OUTPATIENT
Start: 2024-10-23 | End: 2024-10-23

## 2024-10-23 RX ORDER — GENTAMICIN SULFATE 1 MG/G
OINTMENT TOPICAL ONCE
OUTPATIENT
Start: 2024-10-23 | End: 2024-10-23

## 2024-10-23 NOTE — FLOWSHEET NOTE
10/23/24 1400   Wound 02/29/24 Leg Left;Anterior   Date First Assessed/Time First Assessed: 02/29/24 1615   Present on Original Admission: Yes  Wound Approximate Age at First Assessment (Weeks): 4 weeks  Primary Wound Type: Venous Ulcer  Location: Leg  Wound Location Orientation: Left;Anterior   Wound Image    Wound Etiology Venous   Dressing Status New dressing applied   Wound Cleansed Wound cleanser   Dressing/Treatment Dry dressing   Offloading for Diabetic Foot Ulcers Offloading not required   Dressing Change Due 10/31/24   Wound Length (cm) 0.1 cm   Wound Width (cm) 0.1 cm   Wound Depth (cm) 0.1 cm   Wound Surface Area (cm^2) 0.01 cm^2   Change in Wound Size % (l*w) 99.83   Wound Volume (cm^3) 0.001 cm^3   Wound Healing % 100   Wound Assessment Pink/red   Drainage Amount Small (< 25%)   Drainage Description Serous   Odor None   Arlen-wound Assessment Fragile   Margins Attached edges   Wound Thickness Description not for Pressure Injury Partial thickness   Wound 02/29/24 Leg Anterior;Right   Date First Assessed: 02/29/24   Present on Original Admission: Yes  Wound Approximate Age at First Assessment (Weeks): 4 weeks  Primary Wound Type: Venous Ulcer  Location: Leg  Wound Location Orientation: Anterior;Right   Wound Image    Wound Etiology Venous   Dressing Status New dressing applied   Wound Cleansed Wound cleanser   Dressing/Treatment Dry dressing   Offloading for Diabetic Foot Ulcers Offloading not required   Dressing Change Due 10/30/24   Wound Length (cm) 0.1 cm   Wound Width (cm) 0.1 cm   Wound Depth (cm) 0.1 cm   Wound Surface Area (cm^2) 0.01 cm^2   Wound Volume (cm^3) 0.001 cm^3   Wound Assessment Pink/red   Drainage Amount Small (< 25%)   Drainage Description Serous   Odor None   Arlen-wound Assessment Fragile   Margins Attached edges   Wound Thickness Description not for Pressure Injury Partial thickness     Multilayer Compression Wrap   (Not Unna) Below the Knee    NAME:  Hans Trivedi  DATE OF

## 2024-10-24 NOTE — DISCHARGE INSTRUCTIONS
Discharge Instructions from  [place] [address] [phone] Fax [phone]    Do not remove dressing     Return Appointment:  [] Wound and dressing supply provider:   [] ECF or Home Healthcare:  [] Wound Assessment: [] Physician or NP scheduled for Wound Assessment:   [x] Return Appointment: With MD  in  1 Week(s)  [] Ordered tests:       Wound Care Center Information: Should you experience any significant changes in your wound(s) or have questions about your wound care, please contact the [facility] at MONDAY - FRIDAY 8:00 am - 4:30.  If you need help with your wound outside these hours and cannot wait until we are again available, contact your PCP or go to the hospital emergency room.     PLEASE NOTE: IF YOU ARE UNABLE TO OBTAIN WOUND SUPPLIES, CONTINUE TO USE THE SUPPLIES YOU HAVE AVAILABLE UNTIL YOU ARE ABLE TO REACH US. IT IS MOST IMPORTANT TO KEEP THE WOUND COVERED AT ALL TIMES.

## 2024-10-26 NOTE — WOUND CARE
cleanser 10/23/24 1400   Dressing/Treatment Dry dressing 10/23/24 1400   Offloading for Diabetic Foot Ulcers Offloading not required 10/23/24 1400   Dressing Change Due 10/30/24 10/23/24 1400   Wound Length (cm) 0.1 cm 10/23/24 1400   Wound Width (cm) 0.1 cm 10/23/24 1400   Wound Depth (cm) 0.1 cm 10/23/24 1400   Wound Surface Area (cm^2) 0.01 cm^2 10/23/24 1400   Wound Volume (cm^3) 0.001 cm^3 10/23/24 1400   Wound Assessment Pink/red 10/23/24 1400   Drainage Amount Small (< 25%) 10/23/24 1400   Drainage Description Serous 10/23/24 1400   Odor None 10/23/24 1400   Arlen-wound Assessment Fragile 10/23/24 1400   Margins Attached edges 10/23/24 1400   Wound Thickness Description not for Pressure Injury Partial thickness 10/23/24 1400   Number of days: 240              TIME: E/M Time spent with patient and patient care issues: [] 15-20 min  [] 21-30 min  [x] 31-44 min  [] 45 min or more.     This time also includes physician non-face-to-face service time visit on the date of service such as  Preparing to see the patient (eg, review of tests)  Obtaining and/or reviewing separately obtained history  Performing a medically necessary appropriate examination and/or evaluation  Counseling and educating the patient/family/caregiver  Ordering medications, tests, or procedures  Referring and communicating with other health care professionals as needed  Documenting clinical information in the electronic or other health record  Independently interpreting results (not reported separately) and communicating results to the patient/family/caregiver  Care coordination (not reported separately)                Discharge Instructions         Discharge Instructions from  [place] [address]  [phone] Fax [phone]    Do not remove dressing     Return Appointment:  [] Wound and dressing supply provider:   [] ECF or Home Healthcare:  [] Wound Assessment: [] Physician or NP scheduled

## 2024-10-31 ENCOUNTER — HOSPITAL ENCOUNTER (OUTPATIENT)
Facility: HOSPITAL | Age: 64
Discharge: HOME | End: 2024-10-31
Payer: MEDICARE

## 2024-10-31 DIAGNOSIS — I83.029 VENOUS ULCER OF LEFT LEG (HCC): ICD-10-CM

## 2024-10-31 DIAGNOSIS — L97.929 VENOUS ULCER OF LEFT LEG (HCC): ICD-10-CM

## 2024-10-31 DIAGNOSIS — E66.01 MORBIDLY OBESE: ICD-10-CM

## 2024-10-31 DIAGNOSIS — I87.2 CHRONIC VENOUS INSUFFICIENCY: Primary | ICD-10-CM

## 2024-10-31 PROCEDURE — 99212 OFFICE O/P EST SF 10 MIN: CPT

## 2024-10-31 RX ORDER — BACITRACIN ZINC AND POLYMYXIN B SULFATE 500; 1000 [USP'U]/G; [USP'U]/G
OINTMENT TOPICAL ONCE
OUTPATIENT
Start: 2024-10-31 | End: 2024-10-31

## 2024-10-31 RX ORDER — SODIUM CHLOR/HYPOCHLOROUS ACID 0.033 %
SOLUTION, IRRIGATION IRRIGATION ONCE
OUTPATIENT
Start: 2024-10-31 | End: 2024-10-31

## 2024-10-31 RX ORDER — CLOBETASOL PROPIONATE 0.5 MG/G
OINTMENT TOPICAL ONCE
OUTPATIENT
Start: 2024-10-31 | End: 2024-10-31

## 2024-10-31 RX ORDER — BETAMETHASONE DIPROPIONATE 0.5 MG/G
CREAM TOPICAL ONCE
OUTPATIENT
Start: 2024-10-31 | End: 2024-10-31

## 2024-10-31 RX ORDER — GINSENG 100 MG
CAPSULE ORAL ONCE
OUTPATIENT
Start: 2024-10-31 | End: 2024-10-31

## 2024-10-31 RX ORDER — NEOMYCIN/BACITRACIN/POLYMYXINB 3.5-400-5K
OINTMENT (GRAM) TOPICAL ONCE
OUTPATIENT
Start: 2024-10-31 | End: 2024-10-31

## 2024-10-31 RX ORDER — TRIAMCINOLONE ACETONIDE 1 MG/G
OINTMENT TOPICAL ONCE
OUTPATIENT
Start: 2024-10-31 | End: 2024-10-31

## 2024-10-31 RX ORDER — LIDOCAINE HYDROCHLORIDE 20 MG/ML
JELLY TOPICAL ONCE
OUTPATIENT
Start: 2024-10-31 | End: 2024-10-31

## 2024-10-31 RX ORDER — MUPIROCIN 20 MG/G
OINTMENT TOPICAL ONCE
OUTPATIENT
Start: 2024-10-31 | End: 2024-10-31

## 2024-10-31 RX ORDER — GENTAMICIN SULFATE 1 MG/G
OINTMENT TOPICAL ONCE
OUTPATIENT
Start: 2024-10-31 | End: 2024-10-31

## 2024-10-31 RX ORDER — SILVER SULFADIAZINE 10 MG/G
CREAM TOPICAL ONCE
OUTPATIENT
Start: 2024-10-31 | End: 2024-10-31

## 2024-10-31 ASSESSMENT — PAIN SCALES - GENERAL: PAINLEVEL_OUTOF10: 2

## 2024-11-01 VITALS
SYSTOLIC BLOOD PRESSURE: 87 MMHG | HEART RATE: 73 BPM | DIASTOLIC BLOOD PRESSURE: 55 MMHG | RESPIRATION RATE: 16 BRPM | TEMPERATURE: 97.5 F | OXYGEN SATURATION: 97 %

## 2024-11-01 NOTE — FLOWSHEET NOTE
10/31/24 1659   Wound 02/29/24 Leg Left;Anterior   Date First Assessed/Time First Assessed: 02/29/24 1615   Present on Original Admission: Yes  Wound Approximate Age at First Assessment (Weeks): 4 weeks  Primary Wound Type: Venous Ulcer  Location: Leg  Wound Location Orientation: Left;Anterior   Wound Image      Wound Etiology Venous   Dressing Status Intact   Wound Cleansed Wound cleanser   Dressing/Treatment Tubular bandage  (tubigrip size F)   Offloading for Diabetic Foot Ulcers Offloading not required   Dressing Change Due   (Pt discharged)   Wound Length (cm)   (areas clinically closed)   Wound Assessment Epithelialization   Odor None   Arlen-wound Assessment Fragile   Wound 02/29/24 Leg Anterior;Right   Date First Assessed: 02/29/24   Present on Original Admission: Yes  Wound Approximate Age at First Assessment (Weeks): 4 weeks  Primary Wound Type: Venous Ulcer  Location: Leg  Wound Location Orientation: Anterior;Right   Wound Image     Wound Etiology Venous   Dressing Status New dressing applied   Wound Cleansed Wound cleanser   Dressing/Treatment   (Pt discharged from clinic)   Offloading for Diabetic Foot Ulcers Offloading not required   Dressing Change Due   (discharged)   Wound Length (cm)   (clinically closed)   Wound Assessment Epithelialization   Drainage Amount None (dry)   Odor None   Arlen-wound Assessment Intact     Discharged from clinic in tubigrips size F.  Patient has Velcro Compression wraps that he wears after discharge

## 2024-11-01 NOTE — DISCHARGE INSTRUCTIONS
Wound Care Discharge Instructions  Wound Care Clinic at [facility]                                     [address]             [address]               [place] [address]                                         Telephone: [phone]      FAX [phone]    NAME:  Hans Trivedi  YOB: 1960  MEDICAL RECORD NUMBER:  [id number]  DATE: 10/31/2024    Congratulations!  You have completed your treatment.     Return to your Primary Care Physician for all your health issues.   Resume your ordinary activities as tolerated.   Take your medications as prescribed by your primary care physician.   Check your skin daily for cracks, bruises, sores, or dryness. Use a moisturizer as needed.   Clean and dry your skin, using mild soap and warm water (not hot).   Maintain a nutritious diet.  Avoid pressure on your wound site. Keep your legs elevated above the level of the heart whenever possible.  Continue to use wraps/stockings/compression as prescribed.  Replace compression stockings every four to six months as needed to ensure proper fit.   Wear well-fitting shoes and leg garments.    THANK YOU FOR ALLOWING US TO SERVE YOU.  PLEASE CALL IF YOU DEVELOP ANOTHER WOUND. [phone])     Electronically signed by KALIA CAMACHO RN on 10/31/2024 at 3:00 PM

## 2025-05-13 ENCOUNTER — OFFICE VISIT (OUTPATIENT)
Facility: CLINIC | Age: 65
End: 2025-05-13
Payer: MEDICARE

## 2025-05-13 VITALS
TEMPERATURE: 97 F | DIASTOLIC BLOOD PRESSURE: 66 MMHG | WEIGHT: 248 LBS | HEART RATE: 90 BPM | BODY MASS INDEX: 41.32 KG/M2 | HEIGHT: 65 IN | OXYGEN SATURATION: 94 % | RESPIRATION RATE: 17 BRPM | SYSTOLIC BLOOD PRESSURE: 97 MMHG

## 2025-05-13 DIAGNOSIS — M1A.09X0 CHRONIC GOUT OF MULTIPLE SITES, UNSPECIFIED CAUSE: ICD-10-CM

## 2025-05-13 DIAGNOSIS — Z79.4 TYPE 2 DIABETES MELLITUS WITHOUT COMPLICATION, WITH LONG-TERM CURRENT USE OF INSULIN (HCC): Primary | ICD-10-CM

## 2025-05-13 DIAGNOSIS — E11.9 TYPE 2 DIABETES MELLITUS WITHOUT COMPLICATION, WITH LONG-TERM CURRENT USE OF INSULIN (HCC): Primary | ICD-10-CM

## 2025-05-13 DIAGNOSIS — Z12.11 SCREENING FOR COLON CANCER: ICD-10-CM

## 2025-05-13 DIAGNOSIS — R60.0 BILATERAL LEG EDEMA: ICD-10-CM

## 2025-05-13 DIAGNOSIS — I10 ESSENTIAL HYPERTENSION: ICD-10-CM

## 2025-05-13 LAB — HBA1C MFR BLD: 9.1 %

## 2025-05-13 PROCEDURE — 99204 OFFICE O/P NEW MOD 45 MIN: CPT | Performed by: INTERNAL MEDICINE

## 2025-05-13 PROCEDURE — 3046F HEMOGLOBIN A1C LEVEL >9.0%: CPT | Performed by: INTERNAL MEDICINE

## 2025-05-13 PROCEDURE — 83036 HEMOGLOBIN GLYCOSYLATED A1C: CPT | Performed by: INTERNAL MEDICINE

## 2025-05-13 PROCEDURE — 3074F SYST BP LT 130 MM HG: CPT | Performed by: INTERNAL MEDICINE

## 2025-05-13 PROCEDURE — 3078F DIAST BP <80 MM HG: CPT | Performed by: INTERNAL MEDICINE

## 2025-05-13 RX ORDER — INSULIN GLARGINE 100 [IU]/ML
13 INJECTION, SOLUTION SUBCUTANEOUS NIGHTLY
Qty: 5 ADJUSTABLE DOSE PRE-FILLED PEN SYRINGE | Refills: 5 | Status: SHIPPED | OUTPATIENT
Start: 2025-05-13

## 2025-05-13 RX ORDER — CARVEDILOL 25 MG/1
25 TABLET ORAL 2 TIMES DAILY WITH MEALS
Qty: 60 TABLET | Refills: 5 | Status: SHIPPED | OUTPATIENT
Start: 2025-05-13

## 2025-05-13 RX ORDER — DULAGLUTIDE 3 MG/.5ML
3 INJECTION, SOLUTION SUBCUTANEOUS
COMMUNITY
Start: 2025-05-12 | End: 2025-05-13

## 2025-05-13 RX ORDER — FUROSEMIDE 20 MG/1
20 TABLET ORAL DAILY
Qty: 30 TABLET | Refills: 5 | Status: SHIPPED | OUTPATIENT
Start: 2025-05-13

## 2025-05-13 RX ORDER — AMLODIPINE BESYLATE 10 MG/1
10 TABLET ORAL DAILY
Qty: 30 TABLET | Refills: 5 | Status: SHIPPED | OUTPATIENT
Start: 2025-05-13

## 2025-05-13 RX ORDER — ALLOPURINOL 100 MG/1
50 TABLET ORAL DAILY
Qty: 15 TABLET | Refills: 5 | Status: SHIPPED | OUTPATIENT
Start: 2025-05-13

## 2025-05-13 RX ORDER — ALLOPURINOL 100 MG/1
50 TABLET ORAL DAILY
COMMUNITY
End: 2025-05-13 | Stop reason: SDUPTHER

## 2025-05-13 RX ORDER — SITAGLIPTIN AND METFORMIN HYDROCHLORIDE 50; 500 MG/1; MG/1
1 TABLET, FILM COATED, EXTENDED RELEASE ORAL DAILY
COMMUNITY
Start: 2025-05-01 | End: 2025-05-13 | Stop reason: SDUPTHER

## 2025-05-13 RX ORDER — INSULIN GLARGINE 100 [IU]/ML
20 INJECTION, SOLUTION SUBCUTANEOUS NIGHTLY
COMMUNITY
Start: 2025-03-11 | End: 2025-05-13 | Stop reason: ALTCHOICE

## 2025-05-13 RX ORDER — SITAGLIPTIN AND METFORMIN HYDROCHLORIDE 50; 500 MG/1; MG/1
1 TABLET, FILM COATED, EXTENDED RELEASE ORAL DAILY
Qty: 30 TABLET | Refills: 5 | Status: SHIPPED | OUTPATIENT
Start: 2025-05-13

## 2025-05-13 RX ORDER — LISINOPRIL AND HYDROCHLOROTHIAZIDE 12.5; 2 MG/1; MG/1
1 TABLET ORAL DAILY
Qty: 30 TABLET | Refills: 5 | Status: SHIPPED | OUTPATIENT
Start: 2025-05-13

## 2025-05-13 RX ORDER — EMPAGLIFLOZIN 25 MG/1
25 TABLET, FILM COATED ORAL EVERY MORNING
COMMUNITY
Start: 2025-05-01 | End: 2025-05-13

## 2025-05-13 RX ORDER — LISINOPRIL AND HYDROCHLOROTHIAZIDE 12.5; 2 MG/1; MG/1
1 TABLET ORAL DAILY
COMMUNITY
Start: 2025-05-01 | End: 2025-05-13 | Stop reason: SDUPTHER

## 2025-05-13 RX ORDER — ATORVASTATIN CALCIUM 20 MG/1
20 TABLET, FILM COATED ORAL DAILY
Qty: 30 TABLET | Refills: 5 | Status: SHIPPED | OUTPATIENT
Start: 2025-05-13

## 2025-05-13 RX ORDER — BLOOD SUGAR DIAGNOSTIC
1 STRIP MISCELLANEOUS DAILY
COMMUNITY
Start: 2025-05-01

## 2025-05-13 RX ORDER — PEN NEEDLE, DIABETIC 32GX 5/32"
1 NEEDLE, DISPOSABLE MISCELLANEOUS DAILY
COMMUNITY
Start: 2025-05-01 | End: 2025-05-13

## 2025-05-13 SDOH — ECONOMIC STABILITY: FOOD INSECURITY: WITHIN THE PAST 12 MONTHS, THE FOOD YOU BOUGHT JUST DIDN'T LAST AND YOU DIDN'T HAVE MONEY TO GET MORE.: NEVER TRUE

## 2025-05-13 SDOH — ECONOMIC STABILITY: FOOD INSECURITY: WITHIN THE PAST 12 MONTHS, YOU WORRIED THAT YOUR FOOD WOULD RUN OUT BEFORE YOU GOT MONEY TO BUY MORE.: NEVER TRUE

## 2025-05-13 ASSESSMENT — PATIENT HEALTH QUESTIONNAIRE - PHQ9
SUM OF ALL RESPONSES TO PHQ QUESTIONS 1-9: 0
1. LITTLE INTEREST OR PLEASURE IN DOING THINGS: NOT AT ALL
2. FEELING DOWN, DEPRESSED OR HOPELESS: NOT AT ALL
SUM OF ALL RESPONSES TO PHQ QUESTIONS 1-9: 0

## 2025-05-13 ASSESSMENT — ENCOUNTER SYMPTOMS
ABDOMINAL PAIN: 0
SHORTNESS OF BREATH: 0

## 2025-05-13 NOTE — PROGRESS NOTES
Subjective:      Patient ID: Hans Trivedi is a 65 y.o. male.    Patient comes to establish PCP.    Patient history significant for diabetes type 2, hypertension gout.  A1c POC 9.1.  He was using Lantus 10 units daily.  Will increase to 13 units daily.  Educated on lifestyle changes.  Blood pressure well-controlled, continue current treatment.  Statins are well-tolerated.  Will continue treatment.  Will check CMP, lipid panel for monitoring treatment  No current gout flares on allopurinol.      Gout  allopurinol (ZYLOPRIM) 100 MG tablet, half a tablet daily.    Diabetes mellitus type 2  TRULICITY 3 MG/0.5ML SOAJ, weekly.  JANUMET XR  MG TB24 per, daily.  insulin glargine (LANTUS) 100 UNIT/ML injection vial, 13 units daily.  JARDIANCE 10 MG tablet, daily.    Essential hypertension  lisinopril-hydroCHLOROthiazide (PRINZIDE;ZESTORETIC) 20-12.5 MG per tablet, daily.  amLODIPine (NORVASC) 10 MG tablet, daily.  carvedilol (COREG) 25 MG tablet, bid    Bilateral leg edema  furosemide (LASIX) 20 MG tablet, daily    Hyperlipidemia  atorvastatin (LIPITOR) 20 MG tablet, daily.          Review of Systems   Constitutional:  Negative for chills and fever.   Respiratory:  Negative for shortness of breath.    Cardiovascular:  Negative for chest pain.   Gastrointestinal:  Negative for abdominal pain.       Objective:   Visit Vitals  BP 97/66 (BP Cuff Size: Large Adult)   Pulse 90   Temp 97 °F (36.1 °C) (Temporal)   Resp 17   Ht 1.657 m (5' 5.25\")   Wt 112.5 kg (248 lb)   SpO2 94%   BMI 40.95 kg/m²        Physical Exam  Constitutional:       Appearance: He is obese.   Cardiovascular:      Rate and Rhythm: Normal rate and regular rhythm.      Heart sounds: S1 normal and S2 normal.      No S3 or S4 sounds.   Pulmonary:      Effort: Pulmonary effort is normal.      Breath sounds: Normal breath sounds.      Comments: No crackles or wheezing.  Abdominal:      General: Abdomen is flat. Bowel sounds are normal.   Musculoskeletal:

## 2025-05-13 NOTE — PROGRESS NOTES
Have you been to the ER, urgent care clinic since your last visit?  Hospitalized since your last visit?   NO    Have you seen or consulted any other health care providers outside our system since your last visit?   NO      “Have you had a colorectal cancer screening such as a colonoscopy/FIT/Cologuard?    NO    No colonoscopy on file  No cologuard on file  No FIT/FOBT on file   No flexible sigmoidoscopy on file     “Have you had a diabetic eye exam?”    NO     No diabetic eye exam on file

## 2025-05-14 LAB
ALBUMIN SERPL-MCNC: 4.3 G/DL (ref 3.9–4.9)
ALBUMIN/CREAT UR: <2 MG/G CREAT (ref 0–29)
ALP SERPL-CCNC: 85 IU/L (ref 44–121)
ALT SERPL-CCNC: 16 IU/L (ref 0–44)
AST SERPL-CCNC: 16 IU/L (ref 0–40)
BILIRUB SERPL-MCNC: 0.3 MG/DL (ref 0–1.2)
BUN SERPL-MCNC: 33 MG/DL (ref 8–27)
BUN/CREAT SERPL: 23 (ref 10–24)
CALCIUM SERPL-MCNC: 9.8 MG/DL (ref 8.6–10.2)
CHLORIDE SERPL-SCNC: 104 MMOL/L (ref 96–106)
CHOLEST SERPL-MCNC: 136 MG/DL (ref 100–199)
CO2 SERPL-SCNC: 21 MMOL/L (ref 20–29)
CREAT SERPL-MCNC: 1.46 MG/DL (ref 0.76–1.27)
CREAT UR-MCNC: 123.2 MG/DL
EGFRCR SERPLBLD CKD-EPI 2021: 54 ML/MIN/1.73
GLOBULIN SER CALC-MCNC: 2.8 G/DL (ref 1.5–4.5)
GLUCOSE SERPL-MCNC: 175 MG/DL (ref 70–99)
HDLC SERPL-MCNC: 26 MG/DL
LDLC SERPL CALC-MCNC: 82 MG/DL (ref 0–99)
MICROALBUMIN UR-MCNC: <3 UG/ML
POTASSIUM SERPL-SCNC: 4.6 MMOL/L (ref 3.5–5.2)
PROT SERPL-MCNC: 7.1 G/DL (ref 6–8.5)
SODIUM SERPL-SCNC: 139 MMOL/L (ref 134–144)
TRIGL SERPL-MCNC: 161 MG/DL (ref 0–149)
VLDLC SERPL CALC-MCNC: 28 MG/DL (ref 5–40)

## 2025-06-01 ENCOUNTER — RESULTS FOLLOW-UP (OUTPATIENT)
Facility: CLINIC | Age: 65
End: 2025-06-01

## 2025-06-24 ENCOUNTER — OFFICE VISIT (OUTPATIENT)
Facility: CLINIC | Age: 65
End: 2025-06-24
Payer: MEDICARE

## 2025-06-24 VITALS
RESPIRATION RATE: 17 BRPM | HEART RATE: 84 BPM | WEIGHT: 244 LBS | HEIGHT: 65 IN | SYSTOLIC BLOOD PRESSURE: 109 MMHG | BODY MASS INDEX: 40.65 KG/M2 | DIASTOLIC BLOOD PRESSURE: 70 MMHG | TEMPERATURE: 97 F | OXYGEN SATURATION: 93 %

## 2025-06-24 DIAGNOSIS — Z11.4 SCREENING FOR HIV (HUMAN IMMUNODEFICIENCY VIRUS): ICD-10-CM

## 2025-06-24 DIAGNOSIS — E11.9 TYPE 2 DIABETES MELLITUS WITHOUT COMPLICATION, WITH LONG-TERM CURRENT USE OF INSULIN (HCC): ICD-10-CM

## 2025-06-24 DIAGNOSIS — N18.31 STAGE 3A CHRONIC KIDNEY DISEASE (HCC): ICD-10-CM

## 2025-06-24 DIAGNOSIS — Z11.59 ENCOUNTER FOR HEPATITIS C SCREENING TEST FOR LOW RISK PATIENT: ICD-10-CM

## 2025-06-24 DIAGNOSIS — Z79.4 TYPE 2 DIABETES MELLITUS WITHOUT COMPLICATION, WITH LONG-TERM CURRENT USE OF INSULIN (HCC): ICD-10-CM

## 2025-06-24 DIAGNOSIS — Z00.00 MEDICARE ANNUAL WELLNESS VISIT, SUBSEQUENT: Primary | ICD-10-CM

## 2025-06-24 LAB — HBA1C MFR BLD: 7.7 %

## 2025-06-24 PROCEDURE — G0439 PPPS, SUBSEQ VISIT: HCPCS | Performed by: INTERNAL MEDICINE

## 2025-06-24 PROCEDURE — 3074F SYST BP LT 130 MM HG: CPT | Performed by: INTERNAL MEDICINE

## 2025-06-24 PROCEDURE — 3051F HG A1C>EQUAL 7.0%<8.0%: CPT | Performed by: INTERNAL MEDICINE

## 2025-06-24 PROCEDURE — 83036 HEMOGLOBIN GLYCOSYLATED A1C: CPT | Performed by: INTERNAL MEDICINE

## 2025-06-24 PROCEDURE — G2211 COMPLEX E/M VISIT ADD ON: HCPCS | Performed by: INTERNAL MEDICINE

## 2025-06-24 PROCEDURE — 99213 OFFICE O/P EST LOW 20 MIN: CPT | Performed by: INTERNAL MEDICINE

## 2025-06-24 PROCEDURE — 3078F DIAST BP <80 MM HG: CPT | Performed by: INTERNAL MEDICINE

## 2025-06-24 ASSESSMENT — LIFESTYLE VARIABLES
HOW OFTEN DO YOU HAVE A DRINK CONTAINING ALCOHOL: NEVER
HOW MANY STANDARD DRINKS CONTAINING ALCOHOL DO YOU HAVE ON A TYPICAL DAY: PATIENT DOES NOT DRINK

## 2025-06-24 ASSESSMENT — ENCOUNTER SYMPTOMS
ABDOMINAL PAIN: 0
SHORTNESS OF BREATH: 0

## 2025-06-24 NOTE — PROGRESS NOTES
Subjective:      Patient ID: Hans Trivedi is a 65 y.o. male.    Patient comes to establish PCP.    Last appt Lantus was increased to 13 units daily.  Educated on lifestyle changes.  A1c POC inproved to 7.7, congratulated.  Continue current treatment.      Gout  allopurinol (ZYLOPRIM) 100 MG tablet, half a tablet daily.    Diabetes mellitus type 2  TRULICITY 3 MG/0.5ML SOAJ, weekly.  JANUMET XR  MG TB24 per, daily.  insulin glargine (LANTUS) 100 UNIT/ML injection vial, 13 units daily.  JARDIANCE 10 MG tablet, daily.    Essential hypertension  lisinopril-hydroCHLOROthiazide (PRINZIDE;ZESTORETIC) 20-12.5 MG per tablet, daily.  amLODIPine (NORVASC) 10 MG tablet, daily.  carvedilol (COREG) 25 MG tablet, bid    Bilateral leg edema  furosemide (LASIX) 20 MG tablet, daily    Hyperlipidemia  atorvastatin (LIPITOR) 20 MG tablet, daily.          Review of Systems   Constitutional:  Negative for chills and fever.   Respiratory:  Negative for shortness of breath.    Cardiovascular:  Negative for chest pain.   Gastrointestinal:  Negative for abdominal pain.       Objective:   Visit Vitals  /70 (BP Cuff Size: Large Adult)   Pulse 84   Temp 97 °F (36.1 °C) (Temporal)   Resp 17   Ht 1.657 m (5' 5.25\")   Wt 110.7 kg (244 lb)   SpO2 93%   BMI 40.29 kg/m²        Physical Exam  Cardiovascular:      Rate and Rhythm: Normal rate and regular rhythm.      Heart sounds: S1 normal and S2 normal.      No S3 or S4 sounds.   Pulmonary:      Effort: Pulmonary effort is normal.      Breath sounds: Normal breath sounds.      Comments: No crackles or wheezing.  Abdominal:      General: Abdomen is flat. Bowel sounds are normal.   Neurological:      Mental Status: He is alert.         Assessment:       ICD-10-CM    1. Type 2 diabetes mellitus without complication, with long-term current use of insulin (HCC)  E11.9 AMB POC HEMOGLOBIN A1C    Z79.4 Basic Metabolic Panel      2. Stage 3a chronic kidney disease (HCC)  N18.31 Basic Metabolic

## 2025-06-24 NOTE — PROGRESS NOTES
Medicare Annual Wellness Visit    Hans Trivedi is here for Follow-up and Medicare AWV    Assessment & Plan   Type 2 diabetes mellitus without complication, with long-term current use of insulin (HCC)  -     AMB POC HEMOGLOBIN A1C  -     Basic Metabolic Panel; Future  Stage 3a chronic kidney disease (HCC)  -     Basic Metabolic Panel; Future  -     Albumin/Creatinine Ratio, Urine; Future  Screening for HIV (human immunodeficiency virus)  -     HIV 1/2 Ag/Ab, 4TH Generation,W Rflx Confirm; Future  Encounter for hepatitis C screening test for low risk patient  -     Hepatitis C Ab, Rflx to Qt by PCR; Future  Medicare annual wellness visit, subsequent       Return in about 5 months (around 12/3/2025) for Follow up, Treatment monitoring.     Subjective       Patient's complete Health Risk Assessment and screening values have been reviewed and are found in Flowsheets. The following problems were reviewed today and where indicated follow up appointments were made and/or referrals ordered.    Positive Risk Factor Screenings with Interventions:              Inactivity:  On average, how many days per week do you engage in moderate to strenuous exercise (like a brisk walk)?: 2 days (!) Abnormal  On average, how many minutes do you engage in exercise at this level?: 120 min  Interventions:  See AVS for additional education material     Abnormal BMI (obese):  Body mass index is 40.29 kg/m². (!) Abnormal  Interventions:  See AVS for additional education material        Dentist Screen:  Have you seen the dentist within the past year?: (!) No    Intervention:  See AVS for additional education material      Safety:  Do you have non-slip mats or non-slip surfaces or shower bars or grab bars in your shower or bathtub?: (!) No  Interventions:  See AVS for additional education material                   Objective   Vitals:    06/24/25 1119   BP: 109/70   BP Cuff Size: Large Adult   Pulse: 84   Resp: 17   Temp: 97 °F (36.1 °C)

## 2025-06-25 ENCOUNTER — RESULTS FOLLOW-UP (OUTPATIENT)
Facility: CLINIC | Age: 65
End: 2025-06-25

## 2025-07-07 ENCOUNTER — HOSPITAL ENCOUNTER (OUTPATIENT)
Facility: HOSPITAL | Age: 65
Discharge: HOME | End: 2025-07-07
Attending: FAMILY MEDICINE | Admitting: FAMILY MEDICINE
Payer: MEDICARE

## 2025-07-07 VITALS
TEMPERATURE: 98.2 F | DIASTOLIC BLOOD PRESSURE: 69 MMHG | RESPIRATION RATE: 18 BRPM | HEART RATE: 84 BPM | OXYGEN SATURATION: 97 % | SYSTOLIC BLOOD PRESSURE: 93 MMHG

## 2025-07-07 DIAGNOSIS — I83.029 VENOUS ULCER OF LEFT LEG (HCC): ICD-10-CM

## 2025-07-07 DIAGNOSIS — E66.01 MORBIDLY OBESE (HCC): ICD-10-CM

## 2025-07-07 DIAGNOSIS — L97.929 VENOUS ULCER OF LEFT LEG (HCC): ICD-10-CM

## 2025-07-07 DIAGNOSIS — I87.2 CHRONIC VENOUS INSUFFICIENCY: Primary | ICD-10-CM

## 2025-07-07 PROCEDURE — 6370000000 HC RX 637 (ALT 250 FOR IP): Performed by: FAMILY MEDICINE

## 2025-07-07 PROCEDURE — 99203 OFFICE O/P NEW LOW 30 MIN: CPT

## 2025-07-07 RX ORDER — LIDOCAINE HYDROCHLORIDE 20 MG/ML
JELLY TOPICAL ONCE
Status: COMPLETED | OUTPATIENT
Start: 2025-07-07 | End: 2025-07-07

## 2025-07-07 RX ADMIN — LIDOCAINE HYDROCHLORIDE: 20 JELLY TOPICAL at 13:43

## 2025-07-07 NOTE — DISCHARGE INSTRUCTIONS
Discharge Instructions from  Wound Care Clinic at [facility]   [address]   [address]  [place] [address] [phone] Fax [phone]    NAME:  Hans Trivedi  YOB: 1960  MEDICAL RECORD NUMBER:  [id number]  DATE:  7/7/25    Wound Cleansing:   Do not scrub or use excessive force.  Cleanse wound prior to applying a clean dressing with:  [] Normal Saline [] Keep Wound Dry in Shower    [x] Wound Cleanser   [] Cleanse wound with Mild Soap & Water  [] May Shower at Discharge   [] Other:      Topical Treatments:  Do not apply lotions, creams, or ointments to wound bed unless directed.   [] Apply moisturizing lotion to skin surrounding the wound prior to dressing change.  [] Apply antifungal ointment to skin surrounding the wound prior to dressing change.  [] Apply thin film of moisture barrier ointment to skin immediately around wound.  [] Other:       Dressings:           Wound Location left leg   [x] Apply Primary Dressing:       [] MediHoney Gel [] Alginate with Silver [] Alginate   [x] Collagen [] Collagen with Silver   [] Santyl with Moisten saline gauze     [] Hydrocolloid   [] MediHoney Alginate [] Foam with Silver   [] Foam   [] Hydrofera Blue    [] Mepilex Border    [] Moisten with Saline [] Hydrogel [] Mepitel     [] Bactroban/Mupirocin [] Polysporin  [] Other:    [] Pack wound loosely with  [] Iodoform   [] Plain Packing  [] Other   [] Cover and Secure with:     [] Gauze [] Rebecca [] Kerlix   [] Ace Wrap [] Cover Roll Tape [] ABD     [] Other:    Avoid contact of tape with skin.  [] Change dressing: [] Daily    [] Every Other Day [] Three times per week   [] Once a week [] Do Not Change Dressing   [] Other:     Negative Pressure:           Wound Location:   [] Pressure@           mm/Hg  []Continuous []Intermittent   [] Black  [] White Foam [] Other:   []Change dressing: []Three times per week    []Other:     Pressure Relief:  [] When sitting, shift position or do seat

## 2025-07-08 NOTE — PROGRESS NOTES
Wound Center  Progress Note / Procedure Note      Chief Complaint:  Hans Trivedi is a 65 y.o. {race/ethnicity:93136} male  with *** B/L leg wound of *** {WEEKS/MONTHS:96316680} duration.    Referred by:  ***      Assessment/Plan     65 y.o. male with     -R leg chronic ulcer- ***  Full thickness / partial thickness***  Slough/granular  Necessitates debridement  for wound healing and to prevent/heal infection  Ulcer needs debridement- see note below    -L leg chronic ulcer- ***  Full thickness / partial thickness***  Slough/granular  Necessitates debridement  for wound healing and to prevent/heal infection  Ulcer needs debridement- see note below    -Leg edema/venous insufficiency/secondary lymphedema- bilateral ***/ I89.0  Discussed:  On diuretics***  Compression  Elevation  Light exercise  ORDER venous reflux***    -Vascular status  Good/poor*** exam here  ORDER PVR    -nutrition optimization ***  Reviewed daily intake  Reduce salt  Increase protein by 25g/day  Discussed  in detail    -DM2  Went over diet  Last HgbA1c *** -  BS at home ***  Discussed in detail    -offloading   Reviewed  Minimize movement of leg as it disrupts healing process, creating more friction and drainage- risking deterioration of wound/infection***    ***    Following discussed with patient / spouse / CG/   ***  Needs :  Serial debridement- prn    Good local wound care  Dressing:  Frequency : three times a week / every other day  / once weekly  Order/initiate Home Health***  Order supplies***  Continue Home Health***    -Edema management    -Nutrition optimization    -Good Diabetic control    -Good offloading    Patient/ *** understood and agrees with plan. Questions answered.    Serial visits and serial debridements also discussed.    Follow up with me in 1 week    TIME:  total time for today's E/M service used for level of service is documented below.    This time includes physician non-face-to-face service time visit on the date of

## 2025-07-14 ENCOUNTER — HOSPITAL ENCOUNTER (OUTPATIENT)
Facility: HOSPITAL | Age: 65
Discharge: HOME | End: 2025-07-14
Payer: MEDICARE

## 2025-07-14 DIAGNOSIS — L97.929 VENOUS ULCER OF LEFT LEG (HCC): Primary | ICD-10-CM

## 2025-07-14 DIAGNOSIS — E66.01 MORBIDLY OBESE (HCC): ICD-10-CM

## 2025-07-14 DIAGNOSIS — I87.2 CHRONIC VENOUS INSUFFICIENCY: ICD-10-CM

## 2025-07-14 DIAGNOSIS — I83.029 VENOUS ULCER OF LEFT LEG (HCC): Primary | ICD-10-CM

## 2025-07-14 PROCEDURE — 29581 APPL MULTLAYER CMPRN SYS LEG: CPT

## 2025-07-14 NOTE — DISCHARGE INSTRUCTIONS
Compression Wraps Discharge Instructions   Location: Left Leg   Type: 2 layer wrap    Your doctor has ordered compression therapy for your wound. Compression bandages reduce the swelling, or edema, in your legs and prevent it from returning. The wound care staff will apply your compression wrap. It must be removed and re-applied at least weekly. As the swelling decreases, the boot no longer provides adequate compression and you need a new one. Once applied, you need to know how to take care of your compression wrap.     The boot must stay dry. Do not get it wet in the shower or tub. You may do a partial bath, or you can cover the boot with a large plastic bag, secured at the top, so that no water can get in.    Avoid standing in one place for long periods of time. If you must  one place, shift your weight and change positions often.    If you have CHF, consult your doctor before following the next two recommendations for leg elevation.     When sitting, elevate your legs on pillows, or put blocks under the foot of your bed. Your legs should be higher than your heart.    If your boot becomes painful, or you notice an increase in swelling in your toes, numbness or tingling, or purple color to your toes, remove the wrap and call the Wound Care Center. If it is after hours, call your doctor for instructions or go to the nearest emergency room.

## 2025-07-14 NOTE — WOUND CARE
Outpatient Wound Care Note:     Hans Trivedi  MEDICAL RECORD NUMBER:  [id number]  AGE: 65 y.o.   GENDER: male  : 1960  TODAY'S DATE:  2025      Flowsheet:   Wound 25 Pretibial Left (Active)   Wound Image    25 1013   Wound Etiology Venous 25 1013   Dressing Status Intact 25 1013   Wound Cleansed Wound cleanser 25 1013   Dressing/Treatment Alginate with Ag 25 1013   Offloading for Diabetic Foot Ulcers Offloading not required 25 1013   Dressing Change Due 25 1013   Wound Length (cm) 1 cm 25 1013   Wound Width (cm) 1.4 cm 25 1013   Wound Depth (cm) 0.1 cm 25 1013   Wound Surface Area (cm^2) 1.4 cm^2 25 1013   Change in Wound Size % (l*w) 55.56 25 1013   Wound Volume (cm^3) 0.14 cm^3 25 1013   Wound Healing % 56 25 1013   Wound Assessment Pink/red 25 1013   Drainage Amount Scant (moist but unmeasurable) 25 1013   Drainage Description Serous 25 1013   Odor None 25 1013   Arlen-wound Assessment Fragile 25 1013   Margins Attached edges 25 1013   Wound Thickness Description not for Pressure Injury Partial thickness 25 1013   Number of days: 7           Multilayer Compression Wrap   (Not Unna) Below the Knee    NAME:  Hans Trivedi  YOB: 1960  MEDICAL RECORD NUMBER:  [id number]  DATE:  2025    Multilayer compression wrap: Removed old Multilayer wrap if indicated and wash leg with mild soap/water.  Applied moisturizing agent to dry skin as needed.   Applied primary and secondary dressing as ordered.  Applied multilayered dressing below the knee to left lower leg.  Instructed patient/caregiver not to remove dressing and to keep it clean and dry.   Instructed patient/caregiver on complications to report to provider, such as pain, numbness in toes, heavy drainage, and slippage of dressing.  Instructed patient on purpose of compression dressing and on activity

## 2025-07-21 ENCOUNTER — HOSPITAL ENCOUNTER (OUTPATIENT)
Facility: HOSPITAL | Age: 65
Discharge: HOME | End: 2025-07-21
Attending: FAMILY MEDICINE | Admitting: FAMILY MEDICINE
Payer: MEDICARE

## 2025-07-21 VITALS
SYSTOLIC BLOOD PRESSURE: 105 MMHG | HEART RATE: 72 BPM | TEMPERATURE: 97.9 F | RESPIRATION RATE: 16 BRPM | OXYGEN SATURATION: 96 % | DIASTOLIC BLOOD PRESSURE: 69 MMHG

## 2025-07-21 DIAGNOSIS — I83.029 VENOUS ULCER OF LEFT LEG (HCC): Primary | ICD-10-CM

## 2025-07-21 DIAGNOSIS — I87.2 CHRONIC VENOUS INSUFFICIENCY: ICD-10-CM

## 2025-07-21 DIAGNOSIS — E66.01 MORBIDLY OBESE (HCC): ICD-10-CM

## 2025-07-21 DIAGNOSIS — L97.929 VENOUS ULCER OF LEFT LEG (HCC): Primary | ICD-10-CM

## 2025-07-21 PROCEDURE — 29581 APPL MULTLAYER CMPRN SYS LEG: CPT

## 2025-07-21 PROCEDURE — 99213 OFFICE O/P EST LOW 20 MIN: CPT

## 2025-07-21 RX ORDER — MUPIROCIN 2 %
OINTMENT (GRAM) TOPICAL ONCE
Status: DISCONTINUED | OUTPATIENT
Start: 2025-07-21 | End: 2025-07-22 | Stop reason: HOSPADM

## 2025-07-21 RX ORDER — NEOMYCIN/BACITRACIN/POLYMYXINB 3.5-400-5K
OINTMENT (GRAM) TOPICAL ONCE
Status: DISCONTINUED | OUTPATIENT
Start: 2025-07-21 | End: 2025-07-22 | Stop reason: HOSPADM

## 2025-07-21 RX ORDER — BACITRACIN ZINC AND POLYMYXIN B SULFATE 500; 1000 [USP'U]/G; [USP'U]/G
OINTMENT TOPICAL ONCE
Status: DISCONTINUED | OUTPATIENT
Start: 2025-07-21 | End: 2025-07-22 | Stop reason: HOSPADM

## 2025-07-21 RX ORDER — SODIUM CHLOR/HYPOCHLOROUS ACID 0.033 %
SOLUTION, IRRIGATION IRRIGATION ONCE
Status: DISCONTINUED | OUTPATIENT
Start: 2025-07-21 | End: 2025-07-22 | Stop reason: HOSPADM

## 2025-07-21 RX ORDER — GINSENG 100 MG
CAPSULE ORAL ONCE
Status: DISCONTINUED | OUTPATIENT
Start: 2025-07-21 | End: 2025-07-22 | Stop reason: HOSPADM

## 2025-07-21 RX ORDER — CLOBETASOL PROPIONATE 0.5 MG/G
OINTMENT TOPICAL ONCE
Status: DISCONTINUED | OUTPATIENT
Start: 2025-07-21 | End: 2025-07-22 | Stop reason: HOSPADM

## 2025-07-21 RX ORDER — LIDOCAINE HYDROCHLORIDE 20 MG/ML
JELLY TOPICAL ONCE
Status: DISCONTINUED | OUTPATIENT
Start: 2025-07-21 | End: 2025-07-22 | Stop reason: HOSPADM

## 2025-07-21 RX ORDER — BETAMETHASONE DIPROPIONATE 0.5 MG/G
CREAM TOPICAL ONCE
Status: DISCONTINUED | OUTPATIENT
Start: 2025-07-21 | End: 2025-07-22 | Stop reason: HOSPADM

## 2025-07-21 RX ORDER — TRIAMCINOLONE ACETONIDE 1 MG/G
OINTMENT TOPICAL ONCE
Status: DISCONTINUED | OUTPATIENT
Start: 2025-07-21 | End: 2025-07-22 | Stop reason: HOSPADM

## 2025-07-21 RX ORDER — SILVER SULFADIAZINE 10 MG/G
CREAM TOPICAL ONCE
Status: DISCONTINUED | OUTPATIENT
Start: 2025-07-21 | End: 2025-07-22 | Stop reason: HOSPADM

## 2025-07-21 RX ORDER — GENTAMICIN SULFATE 1 MG/G
OINTMENT TOPICAL ONCE
Status: DISCONTINUED | OUTPATIENT
Start: 2025-07-21 | End: 2025-07-22 | Stop reason: HOSPADM

## 2025-07-21 NOTE — PROGRESS NOTES
Outpatient Wound Care Note:     Hans Trivedi  MEDICAL RECORD NUMBER:  [id number]  AGE: 65 y.o.   GENDER: male  : 1960  TODAY'S DATE:  2025      Flowsheet:   Wound 25 Pretibial Left (Active)   Wound Image   25 1002   Wound Etiology Venous 25 1002   Dressing Status Intact 25 1002   Wound Cleansed Wound cleanser 25 1002   Dressing/Treatment Alginate with Ag 25 1013   Offloading for Diabetic Foot Ulcers Offloading not required 25 1002   Dressing Change Due 25 1002   Wound Length (cm) 1 cm 25 1013   Wound Width (cm) 0 cm 25   Wound Depth (cm) 0 cm 25 1002   Wound Surface Area (cm^2) 1.4 cm^2 25 1013   Change in Wound Size % (l*w) 55.56 25 1013   Wound Volume (cm^3) 0.14 cm^3 25 1013   Wound Healing % 56 25 1013   Post-Procedure Length (cm) 0 cm 25 1002   Post-Procedure Width (cm) 0 cm 25 1002   Post-Procedure Depth (cm) 0 cm 25 1002   Post-Procedure Surface Area (cm^2) 0 cm^2 25 1002   Post-Procedure Volume (cm^3) 0 cm^3 25 1002   Distance Tunneling (cm) 0 cm 25 1002   Tunneling Position ___ O'Clock 0 25 1002   Undermining Starts ___ O'Clock 0 25 1002   Undermining Ends___ O'Clock 0 25   Undermining Maxium Distance (cm) 0 25   Wound Assessment Epithelialization 25 1002   Drainage Amount None (dry) 25 1002   Drainage Description Serous 25 1013   Odor None 25 1002   Arlen-wound Assessment Dry/flaky;Intact 25 1002   Margins Attached edges 25 1013   Wound Thickness Description not for Pressure Injury Partial thickness 25 1013   Number of days: 14

## 2025-07-24 LAB — NONINV COLON CA DNA+OCC BLD SCRN STL QL: NEGATIVE

## 2025-07-28 ENCOUNTER — HOSPITAL ENCOUNTER (OUTPATIENT)
Facility: HOSPITAL | Age: 65
Discharge: HOME | End: 2025-07-28
Payer: MEDICARE

## 2025-07-28 VITALS
RESPIRATION RATE: 16 BRPM | HEART RATE: 76 BPM | TEMPERATURE: 97.7 F | DIASTOLIC BLOOD PRESSURE: 81 MMHG | OXYGEN SATURATION: 97 % | SYSTOLIC BLOOD PRESSURE: 113 MMHG

## 2025-07-28 DIAGNOSIS — I83.029 VENOUS ULCER OF LEFT LEG (HCC): Primary | ICD-10-CM

## 2025-07-28 DIAGNOSIS — L97.929 VENOUS ULCER OF LEFT LEG (HCC): Primary | ICD-10-CM

## 2025-07-28 DIAGNOSIS — E66.01 MORBIDLY OBESE (HCC): ICD-10-CM

## 2025-07-28 DIAGNOSIS — I87.2 CHRONIC VENOUS INSUFFICIENCY: ICD-10-CM

## 2025-07-28 PROCEDURE — 99213 OFFICE O/P EST LOW 20 MIN: CPT

## 2025-07-28 RX ORDER — SODIUM CHLOR/HYPOCHLOROUS ACID 0.033 %
SOLUTION, IRRIGATION IRRIGATION ONCE
Status: DISCONTINUED | OUTPATIENT
Start: 2025-07-28 | End: 2025-07-29 | Stop reason: HOSPADM

## 2025-07-28 RX ORDER — BETAMETHASONE DIPROPIONATE 0.5 MG/G
CREAM TOPICAL ONCE
Status: DISCONTINUED | OUTPATIENT
Start: 2025-07-28 | End: 2025-07-29 | Stop reason: HOSPADM

## 2025-07-28 RX ORDER — MUPIROCIN 2 %
OINTMENT (GRAM) TOPICAL ONCE
Status: DISCONTINUED | OUTPATIENT
Start: 2025-07-28 | End: 2025-07-29 | Stop reason: HOSPADM

## 2025-07-28 RX ORDER — SILVER SULFADIAZINE 10 MG/G
CREAM TOPICAL ONCE
Status: DISCONTINUED | OUTPATIENT
Start: 2025-07-28 | End: 2025-07-29 | Stop reason: HOSPADM

## 2025-07-28 RX ORDER — NEOMYCIN/BACITRACIN/POLYMYXINB 3.5-400-5K
OINTMENT (GRAM) TOPICAL ONCE
Status: DISCONTINUED | OUTPATIENT
Start: 2025-07-28 | End: 2025-07-29 | Stop reason: HOSPADM

## 2025-07-28 RX ORDER — LIDOCAINE HYDROCHLORIDE 20 MG/ML
JELLY TOPICAL ONCE
Status: DISCONTINUED | OUTPATIENT
Start: 2025-07-28 | End: 2025-07-29 | Stop reason: HOSPADM

## 2025-07-28 RX ORDER — CLOBETASOL PROPIONATE 0.5 MG/G
OINTMENT TOPICAL ONCE
Status: DISCONTINUED | OUTPATIENT
Start: 2025-07-28 | End: 2025-07-29 | Stop reason: HOSPADM

## 2025-07-28 RX ORDER — GENTAMICIN SULFATE 1 MG/G
OINTMENT TOPICAL ONCE
Status: DISCONTINUED | OUTPATIENT
Start: 2025-07-28 | End: 2025-07-29 | Stop reason: HOSPADM

## 2025-07-28 RX ORDER — TRIAMCINOLONE ACETONIDE 1 MG/G
OINTMENT TOPICAL ONCE
Status: DISCONTINUED | OUTPATIENT
Start: 2025-07-28 | End: 2025-07-29 | Stop reason: HOSPADM

## 2025-07-28 RX ORDER — BACITRACIN ZINC AND POLYMYXIN B SULFATE 500; 1000 [USP'U]/G; [USP'U]/G
OINTMENT TOPICAL ONCE
Status: DISCONTINUED | OUTPATIENT
Start: 2025-07-28 | End: 2025-07-29 | Stop reason: HOSPADM

## 2025-07-28 RX ORDER — GINSENG 100 MG
CAPSULE ORAL ONCE
Status: DISCONTINUED | OUTPATIENT
Start: 2025-07-28 | End: 2025-07-29 | Stop reason: HOSPADM

## 2025-07-28 NOTE — PROGRESS NOTES
Outpatient Wound Care Note:     Hans Trivedi  MEDICAL RECORD NUMBER:  [id number]  AGE: 65 y.o.   GENDER: male  : 1960  TODAY'S DATE:  2025      Flowsheet:   Wound 25 Pretibial Left (Active)   Wound Image   25 1228   Wound Etiology Venous 25 1228   Dressing Status Intact 25 1228   Wound Cleansed Wound cleanser 25 1228   Dressing/Treatment Alginate;Tubular bandage 25 1228   Offloading for Diabetic Foot Ulcers Offloading not required 25 1228   Dressing Change Due 25 1002   Wound Length (cm) 1 cm 25 1013   Wound Width (cm) 0 cm 25 1002   Wound Depth (cm) 0 cm 25 1002   Wound Surface Area (cm^2) 1.4 cm^2 25 1013   Change in Wound Size % (l*w) 55.56 25 1013   Wound Volume (cm^3) 0.14 cm^3 25 1013   Wound Healing % 56 25 1013   Post-Procedure Length (cm) 0 cm 25 1002   Post-Procedure Width (cm) 0 cm 25 1002   Post-Procedure Depth (cm) 0 cm 25 1002   Post-Procedure Surface Area (cm^2) 0 cm^2 25 1002   Post-Procedure Volume (cm^3) 0 cm^3 25 1002   Distance Tunneling (cm) 0 cm 25 1002   Tunneling Position ___ O'Clock 0 25 1002   Undermining Starts ___ O'Clock 0 25 1002   Undermining Ends___ O'Clock 0 25 1002   Undermining Maxium Distance (cm) 0 25 1002   Wound Assessment Epithelialization 25 1002   Drainage Amount None (dry) 25 1002   Drainage Description Serous 25 1013   Odor None 25 1002   Arlen-wound Assessment Dry/flaky;Intact 25 1002   Margins Attached edges 25 1013   Wound Thickness Description not for Pressure Injury Partial thickness 25 1013   Number of days: 21

## 2025-07-30 RX ORDER — COLCHICINE 0.6 MG/1
0.6 TABLET ORAL DAILY
Qty: 30 TABLET | Refills: 11 | OUTPATIENT
Start: 2025-07-30

## 2025-08-08 DIAGNOSIS — R60.0 LOWER EXTREMITY EDEMA: Primary | ICD-10-CM
